# Patient Record
Sex: FEMALE | Race: WHITE | NOT HISPANIC OR LATINO | URBAN - METROPOLITAN AREA
[De-identification: names, ages, dates, MRNs, and addresses within clinical notes are randomized per-mention and may not be internally consistent; named-entity substitution may affect disease eponyms.]

---

## 2023-10-30 PROBLEM — R35.1 NOCTURIA: Status: ACTIVE | Noted: 2023-10-30

## 2023-10-30 PROBLEM — N95.2 ATROPHIC VAGINITIS: Status: ACTIVE | Noted: 2023-10-30

## 2023-10-30 PROBLEM — N39.3 FEMALE STRESS INCONTINENCE: Status: ACTIVE | Noted: 2023-10-30

## 2023-10-30 PROBLEM — N81.89 PELVIC FLOOR WEAKNESS: Status: ACTIVE | Noted: 2023-10-30

## 2023-10-30 RX ORDER — MULTIVITAMIN
1 TABLET ORAL DAILY
COMMUNITY

## 2023-10-30 RX ORDER — ESTRADIOL 0.1 MG/G
CREAM VAGINAL
COMMUNITY
Start: 2020-03-02 | End: 2023-12-12 | Stop reason: WASHOUT

## 2023-10-30 RX ORDER — PANTOPRAZOLE SODIUM 40 MG/1
40 TABLET, DELAYED RELEASE ORAL DAILY
COMMUNITY
Start: 2019-04-29 | End: 2023-12-12 | Stop reason: WASHOUT

## 2023-10-30 RX ORDER — METHOCARBAMOL 750 MG/1
750 TABLET, FILM COATED ORAL DAILY PRN
COMMUNITY
Start: 2019-06-12 | End: 2023-12-12 | Stop reason: WASHOUT

## 2023-10-30 RX ORDER — CETIRIZINE HYDROCHLORIDE 10 MG/1
10 TABLET ORAL DAILY
COMMUNITY
Start: 2017-04-20 | End: 2023-12-12 | Stop reason: WASHOUT

## 2023-10-30 RX ORDER — MIRTAZAPINE 15 MG/1
15 TABLET, FILM COATED ORAL NIGHTLY
COMMUNITY
Start: 2023-04-20 | End: 2023-12-12 | Stop reason: WASHOUT

## 2023-10-30 ASSESSMENT — ENCOUNTER SYMPTOMS: ABDOMINAL PAIN: 1

## 2023-10-31 ENCOUNTER — TELEPHONE (OUTPATIENT)
Dept: PRIMARY CARE | Facility: CLINIC | Age: 63
End: 2023-10-31

## 2023-10-31 ENCOUNTER — OFFICE VISIT (OUTPATIENT)
Dept: PRIMARY CARE | Facility: CLINIC | Age: 63
End: 2023-10-31
Payer: COMMERCIAL

## 2023-10-31 VITALS — SYSTOLIC BLOOD PRESSURE: 124 MMHG | DIASTOLIC BLOOD PRESSURE: 80 MMHG | HEIGHT: 64 IN | HEART RATE: 78 BPM

## 2023-10-31 DIAGNOSIS — R93.5 ABNORMAL CT OF THE ABDOMEN: ICD-10-CM

## 2023-10-31 DIAGNOSIS — R93.2 ABNORMAL CT OF LIVER: Primary | ICD-10-CM

## 2023-10-31 PROCEDURE — 99213 OFFICE O/P EST LOW 20 MIN: CPT | Performed by: INTERNAL MEDICINE

## 2023-10-31 PROCEDURE — 1036F TOBACCO NON-USER: CPT | Performed by: INTERNAL MEDICINE

## 2023-10-31 NOTE — PROGRESS NOTES
"Subjective   Patient ID: Cecelia Birmingham is a 63 y.o. female who presents for Sick Visit (Abdominal pain ).    Abdominal Pain  This is a new problem. The current episode started 1 to 4 weeks ago. The onset quality is sudden. The problem occurs constantly. The problem has been gradually improving. The pain is located in the LUQ and periumbilical region. The pain is at a severity of 6/10. The quality of the pain is sharp. The abdominal pain radiates to the epigastric region and periumbilical region. Nothing aggravates the pain. The pain is relieved by Sitting up. Prior diagnostic workup includes CT scan.    abd pain  See cuellar er   Ct abnormal had liver aortic exam     Then home     Now we need full cuellar of those lesions  No weight lesions     Review of Systems   Gastrointestinal:  Positive for abdominal pain.       Objective   /80 (BP Location: Left arm, Patient Position: Sitting, BP Cuff Size: Small adult)   Pulse 78   Ht 1.626 m (5' 4\")     Physical Exam  Scant abd pain left side   Card rrr  Lungs cta      Assessment/Plan   Diagnoses and all orders for this visit:  Abnormal CT of liver  Comments:  see ct scan needs mri  Abnormal CT of the abdomen  Comments:  see ct scan has an abnormal area of the low abd       "

## 2023-10-31 NOTE — TELEPHONE ENCOUNTER
For the MRI PELVIS - what is the reason for the test - trying to get prior auth.  The MRI Liver/Abdomen is for the liver and kidney abnormalities on the CT Scan, but I am not seeing the Pelvis reason?   Thanks.

## 2023-11-06 ASSESSMENT — ENCOUNTER SYMPTOMS: ABDOMINAL PAIN: 1

## 2023-11-16 ENCOUNTER — APPOINTMENT (OUTPATIENT)
Dept: PRIMARY CARE | Facility: CLINIC | Age: 63
End: 2023-11-16
Payer: COMMERCIAL

## 2023-11-28 ENCOUNTER — HOSPITAL ENCOUNTER (OUTPATIENT)
Dept: RADIOLOGY | Facility: HOSPITAL | Age: 63
Discharge: HOME | End: 2023-11-28
Payer: COMMERCIAL

## 2023-11-28 DIAGNOSIS — R93.2 ABNORMAL FINDINGS ON DIAGNOSTIC IMAGING OF LIVER AND BILIARY TRACT: ICD-10-CM

## 2023-11-28 DIAGNOSIS — R93.5 ABNORMAL FINDINGS ON DIAGNOSTIC IMAGING OF OTHER ABDOMINAL REGIONS, INCLUDING RETROPERITONEUM: ICD-10-CM

## 2023-11-28 PROCEDURE — A9575 INJ GADOTERATE MEGLUMI 0.1ML: HCPCS | Performed by: INTERNAL MEDICINE

## 2023-11-28 PROCEDURE — 2550000001 HC RX 255 CONTRASTS: Performed by: INTERNAL MEDICINE

## 2023-11-28 PROCEDURE — 74183 MRI ABD W/O CNTR FLWD CNTR: CPT | Performed by: STUDENT IN AN ORGANIZED HEALTH CARE EDUCATION/TRAINING PROGRAM

## 2023-11-28 PROCEDURE — 74183 MRI ABD W/O CNTR FLWD CNTR: CPT

## 2023-11-28 RX ORDER — GADOTERATE MEGLUMINE 376.9 MG/ML
0.2 INJECTION INTRAVENOUS
Status: COMPLETED | OUTPATIENT
Start: 2023-11-28 | End: 2023-11-28

## 2023-11-28 RX ADMIN — GADOTERATE MEGLUMINE 10.4 ML: 376.9 INJECTION INTRAVENOUS at 07:44

## 2023-11-30 ENCOUNTER — TELEPHONE (OUTPATIENT)
Dept: PRIMARY CARE | Facility: CLINIC | Age: 63
End: 2023-11-30
Payer: COMMERCIAL

## 2023-11-30 DIAGNOSIS — R93.5 ABNORMAL MRI OF ABDOMEN: ICD-10-CM

## 2023-11-30 DIAGNOSIS — R93.5 ABNORMAL CT OF THE ABDOMEN: Primary | ICD-10-CM

## 2023-12-12 ENCOUNTER — OFFICE VISIT (OUTPATIENT)
Dept: SURGERY | Facility: CLINIC | Age: 63
End: 2023-12-12
Payer: COMMERCIAL

## 2023-12-12 VITALS
DIASTOLIC BLOOD PRESSURE: 72 MMHG | TEMPERATURE: 98.3 F | RESPIRATION RATE: 16 BRPM | HEIGHT: 64 IN | SYSTOLIC BLOOD PRESSURE: 112 MMHG | BODY MASS INDEX: 19.38 KG/M2 | WEIGHT: 113.54 LBS | OXYGEN SATURATION: 93 % | HEART RATE: 64 BPM

## 2023-12-12 DIAGNOSIS — R93.5 ABNORMAL CT OF THE ABDOMEN: ICD-10-CM

## 2023-12-12 DIAGNOSIS — R93.5 ABNORMAL MRI OF ABDOMEN: ICD-10-CM

## 2023-12-12 PROCEDURE — 1036F TOBACCO NON-USER: CPT | Performed by: SURGERY

## 2023-12-12 PROCEDURE — 99205 OFFICE O/P NEW HI 60 MIN: CPT | Performed by: SURGERY

## 2023-12-12 ASSESSMENT — PAIN SCALES - GENERAL: PAINLEVEL: 0-NO PAIN

## 2023-12-12 NOTE — PROGRESS NOTES
History Of Present Illness :  Cecelia Birmingham is a 63 y.o. female who presents on referral from her physician, Dr. Heriberto Hebert, for evaluation with regard to recent abnormal abdominal imaging.    On 10/25/2023, the patient noticed the acute onset of sharp mid abdominal pain shortly after awakening from sleep.  This persisted throughout the day.   she had associated bloating.  She did go to work but did ultimately go to the [facility] emergency room in [facility] for further evaluation and treatment.  She denies fever chills sweats nausea vomiting diarrhea or constipation.  CT scan was performed and she was treated and released.  She apparently was discharged on antibiotics.  Thereafter, she felt better.  The bloating is resolved.  She only has some minimal residual abdominal discomfort.    Patient underwent a CT scan of the abdomen pelvis with IV contrast at [facility], for the indication of left lower quadrant abdominal pain..  I do not have access to the images, with the report.  According to report, the patient had some aortocaval and left periaortic ill-defined soft tissue extending below the aortic bifurcation.  Differential was thought to be retroperitoneal fibrosis, idiopathic, nonaneurysmal form of chronic aortitis or lymphoma.  There was no associated hydroureteronephrosis.  In addition a 2.8 cm mixed hyperattenuating and hypoattenuating mass was noted the left lateral lobe of the liver thought to be periportal edema.  A tiny 7 mm lesion was noted in the lower pole of the right kidney.    Patient underwent follow-up MRI imaging at  and I reviewed the report and the images.  A simple cyst was noted in the right kidney 5 mm lower pole.  Hemangioma noted in the liver corresponding to the CT finding.  The retroperitoneal soft tissue fullness is confirmed and not significantly different than the CAT scan imaging.  The differential diagnosis remained the same.    The patient is never had a previous abdominal  operation.    The patient is .  She lives in [place].  She is nulligravida.  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    Past Medical History   No past medical history on file.     Surgical History    Past Surgical History:   Procedure Laterality Date    OTHER SURGICAL HISTORY  03/02/2020    Ablation        Allergies   No Known Allergies     Home Meds  Current Outpatient Medications   Medication Instructions    cetirizine (ZYRTEC) 10 mg, oral, Daily    estradiol (Estrace) 0.01 % (0.1 mg/gram) vaginal cream 0.5 mg Vaginal and vulva Two times a Week for 90 days    methocarbamol (ROBAXIN) 750 mg, oral, Daily PRN    mirtazapine (REMERON) 15 mg, oral, Nightly    multivitamin tablet 1 tablet, oral, Daily    pantoprazole (PROTONIX) 40 mg, oral, Daily        Family History    Family History   Problem Relation Name Age of Onset    Breast cancer Other Grandparent         Social History  Social History     Tobacco Use    Smoking status: Never     Passive exposure: Never    Smokeless tobacco: Never   Substance Use Topics    Alcohol use: Yes     Alcohol/week: 2.0 standard drinks of alcohol     Types: 2 Glasses of wine per week    Drug use: Never        Review Of Systems    Review of Systems    General: Not Present- Obesity, Cancer, HIV, MRSA, Recent Cold/Flu, Tired During the Day and VRE.  HEENT: Not Present- Migraine, Cataracts, Glaucoma, Macular Degeneration and Retinal Detachment.  Respiratory: Not Present- Asthma, Chronic Cough, Difficulty Breathing on Exertion, Difficulty Breathing at Rest, Emphysema, Frequent Bronchitis, Home CPAP/BiPAP, Home Oxygen, Pulmonary Embolus, Pneumonia/TB, Sleep Apnea and Snoring.  Cardiovascular: Not Present- Chest Pain, Congestive Heart Failure, Heart Attack, Coronary Artery Disease, Heart Stent, High Cholesterol/Lipids, Hypertension, Internal Defibrillator, Irregular Heart Beat, Mitral Valve Prolapse, Murmur, Pacemaker and Peripheral Vascular Disease.  Gastrointestinal: Not Present-  Heartburn, Hepatitis, Hiatal Hernia, Jaundice, Reflux, Stomach Ulcer and IBS.  Female Genitourinary: Not Present- Kidney Failure, Kidney Stones, Dialysis and Urinary Tract Infection.  Musculoskeletal: Not Present- Arthritis, Back Pain and Fibromyalgia.  Neurological: Not Present- Headaches, Numbness, Tingling, Seizures, Stroke,  Shunt and Weakness.  Psychiatric: Not Present- Anxiety, Bipolar, Depression and Panic Attacks.  Endocrine: Not Present- Diabetes, Hyperthyroidism, Hypothyroidism and Low Blood Sugar.  Hematology: Not Present- Abnormal Bleeding, Anemia and Blood Clots.    Vitals  There were no vitals taken for this visit.     Physical Exam   Abdominal / Ano-Rectal Physical Exam    General  General Appearance - Not in acute distress.  Well-developed and well-nourished.  Alert and oriented times 3.    Chest and Lung Exam  Auscultation:  Breath sounds: - Normal.  Clear and equal bilaterally.  Adventitious sounds: - No Adventitious sounds.    Cardiovascular  Auscultation: Rate and Rhythm - Regular. Heart Sounds - Normal heart sounds.  No murmurs.    Abdomen  Inspection: Inspection of the abdomen reveals - No Visible peristalsis, No Abnormal pulsations, No Paradoxical  movements and No Hernias.  Palpation/Percussion: Palpation and Percussion of the abdomen reveal - Non Tender, No Rebound tenderness, NoRigidity (guarding) and No Palpable abdominal masses.  Liver: - Normal.  Spleen: - Normal.  Auscultation: Auscultation of the abdomen reveals - Bowel sounds normal and No Abdominal bruits.  Surgical scars:  none    Inguinal  No inguinal or femoral hernias or lymphadenopathy bilaterally.    Rectal  Anorectal Exam:  not examined.      Assessment/Plan   Mrs. Birmingham has retroperitoneal soft tissue thickening along the left lateral aspect of the aorta near the bifurcation of uncertain etiology.  This was discovered with with CAT scan imaging with transient abdominal pain.  This is likely an incidental finding.   Follow-up MRI revealed similar findings.  Etiology is unclear.    Recommendation:    Will review the MRI imaging with Dr. Dick Bansal, interventional radiologist at [facility].  Management options include biopsy versus short-term imaging follow-up in 4 to 6 months.  The latter will likely be the recommendation.  I will call the patient with results of the review and the management plan.

## 2023-12-18 ENCOUNTER — HOSPITAL ENCOUNTER (OUTPATIENT)
Dept: RADIOLOGY | Facility: EXTERNAL LOCATION | Age: 63
Discharge: HOME | End: 2023-12-18

## 2023-12-21 ENCOUNTER — TELEPHONE (OUTPATIENT)
Dept: SURGERY | Facility: CLINIC | Age: 63
End: 2023-12-21
Payer: COMMERCIAL

## 2023-12-21 NOTE — TELEPHONE ENCOUNTER
----- Message from Cecelia Birmingham sent at 12/20/2023  4:47 PM EST -----  Regarding: Question  Contact: [phone]  I wanted to know if my last message implied that the radiologist at [facility] read my scans.

## 2023-12-27 ENCOUNTER — DOCUMENTATION (OUTPATIENT)
Dept: SURGERY | Facility: CLINIC | Age: 63
End: 2023-12-27
Payer: COMMERCIAL

## 2023-12-27 DIAGNOSIS — R93.5 ABNORMAL MRI OF ABDOMEN: ICD-10-CM

## 2023-12-27 DIAGNOSIS — R93.5 ABNORMAL CT OF THE ABDOMEN: Primary | ICD-10-CM

## 2023-12-27 NOTE — PROGRESS NOTES
The patient was seen by me for an initial office consultation on 12/12/2023.  Please see that note for details.    Thereafter, I did have our interventional radiologist at [facility], Dr. Dick Bansal reviewed the images.  He initially reviewed the MRI, and subsequently the CT images were uploaded to the  PACS.  These were also reviewed by Dr. Bansal.  He does think the lesion measures about 1.5 cm in diameter and is amendable to CT-guided percutaneous biopsy.    I discussed this with the patient in detail on 12/22/2023, and she does agree to proceed with biopsy.  I have placed the order, and discussed via chat with Dr. Bansal and our radiology supervisor.  The biopsy will be arranged for Friday in January electively.  I will call the patient thereafter, with the biopsy results and management plan.

## 2024-01-12 ENCOUNTER — HOSPITAL ENCOUNTER (OUTPATIENT)
Dept: RADIOLOGY | Facility: HOSPITAL | Age: 64
Discharge: HOME | End: 2024-01-12
Payer: COMMERCIAL

## 2024-01-12 VITALS
RESPIRATION RATE: 16 BRPM | OXYGEN SATURATION: 100 % | SYSTOLIC BLOOD PRESSURE: 107 MMHG | BODY MASS INDEX: 20.02 KG/M2 | DIASTOLIC BLOOD PRESSURE: 66 MMHG | HEART RATE: 71 BPM | TEMPERATURE: 98.2 F | WEIGHT: 113 LBS | HEIGHT: 63 IN

## 2024-01-12 DIAGNOSIS — R93.5 ABNORMAL MRI OF ABDOMEN: ICD-10-CM

## 2024-01-12 DIAGNOSIS — R93.5 ABNORMAL CT OF THE ABDOMEN: ICD-10-CM

## 2024-01-12 LAB
INR PPP: 1.1 (ref 0.9–1.1)
PLATELET # BLD AUTO: 385 X10*3/UL (ref 150–450)
PROTHROMBIN TIME: 12.4 SECONDS (ref 9.8–12.8)

## 2024-01-12 PROCEDURE — 36415 COLL VENOUS BLD VENIPUNCTURE: CPT | Performed by: RADIOLOGY

## 2024-01-12 PROCEDURE — 99153 MOD SED SAME PHYS/QHP EA: CPT

## 2024-01-12 PROCEDURE — 88307 TISSUE EXAM BY PATHOLOGIST: CPT | Mod: TC,SUR,PARLAB | Performed by: SURGERY

## 2024-01-12 PROCEDURE — 99152 MOD SED SAME PHYS/QHP 5/>YRS: CPT

## 2024-01-12 PROCEDURE — 77012 CT SCAN FOR NEEDLE BIOPSY: CPT | Performed by: RADIOLOGY

## 2024-01-12 PROCEDURE — 2720000007 HC OR 272 NO HCPCS

## 2024-01-12 PROCEDURE — 99153 MOD SED SAME PHYS/QHP EA: CPT | Performed by: RADIOLOGY

## 2024-01-12 PROCEDURE — 85049 AUTOMATED PLATELET COUNT: CPT | Performed by: RADIOLOGY

## 2024-01-12 PROCEDURE — 88365 INSITU HYBRIDIZATION (FISH): CPT | Performed by: PATHOLOGY

## 2024-01-12 PROCEDURE — 77012 CT SCAN FOR NEEDLE BIOPSY: CPT

## 2024-01-12 PROCEDURE — 99152 MOD SED SAME PHYS/QHP 5/>YRS: CPT | Performed by: RADIOLOGY

## 2024-01-12 PROCEDURE — 88341 IMHCHEM/IMCYTCHM EA ADD ANTB: CPT | Performed by: PATHOLOGY

## 2024-01-12 PROCEDURE — 2500000005 HC RX 250 GENERAL PHARMACY W/O HCPCS: Performed by: RADIOLOGY

## 2024-01-12 PROCEDURE — 88342 IMHCHEM/IMCYTCHM 1ST ANTB: CPT | Performed by: PATHOLOGY

## 2024-01-12 PROCEDURE — 88307 TISSUE EXAM BY PATHOLOGIST: CPT | Performed by: PATHOLOGY

## 2024-01-12 PROCEDURE — 2500000004 HC RX 250 GENERAL PHARMACY W/ HCPCS (ALT 636 FOR OP/ED): Performed by: RADIOLOGY

## 2024-01-12 PROCEDURE — 85610 PROTHROMBIN TIME: CPT | Performed by: RADIOLOGY

## 2024-01-12 PROCEDURE — 49180 BIOPSY ABDOMINAL MASS: CPT | Performed by: RADIOLOGY

## 2024-01-12 RX ORDER — FENTANYL CITRATE 50 UG/ML
INJECTION, SOLUTION INTRAMUSCULAR; INTRAVENOUS
Status: COMPLETED | OUTPATIENT
Start: 2024-01-12 | End: 2024-01-12

## 2024-01-12 RX ORDER — MIDAZOLAM HYDROCHLORIDE 1 MG/ML
INJECTION, SOLUTION INTRAMUSCULAR; INTRAVENOUS
Status: COMPLETED | OUTPATIENT
Start: 2024-01-12 | End: 2024-01-12

## 2024-01-12 RX ORDER — SODIUM CHLORIDE 9 MG/ML
INJECTION, SOLUTION INTRAVENOUS CONTINUOUS PRN
Status: COMPLETED | OUTPATIENT
Start: 2024-01-12 | End: 2024-01-12

## 2024-01-12 RX ORDER — MILK THISTLE 150 MG
1 CAPSULE ORAL DAILY
COMMUNITY

## 2024-01-12 RX ORDER — UBIDECARENONE 75 MG
500 CAPSULE ORAL DAILY
COMMUNITY

## 2024-01-12 RX ORDER — BUTYROSPERMUM PARKII(SHEA BUTTER), SIMMONDSIA CHINENSIS (JOJOBA) SEED OIL, ALOE BARBADENSIS LEAF EXTRACT .01; 1; 3.5 G/100G; G/100G; G/100G
250 LIQUID TOPICAL DAILY
COMMUNITY

## 2024-01-12 RX ADMIN — FENTANYL CITRATE 50 MCG: 50 INJECTION, SOLUTION INTRAMUSCULAR; INTRAVENOUS at 10:19

## 2024-01-12 RX ADMIN — SODIUM CHLORIDE 50 ML/HR: 9 INJECTION, SOLUTION INTRAVENOUS at 10:19

## 2024-01-12 RX ADMIN — Medication 3 L/MIN: at 10:19

## 2024-01-12 RX ADMIN — MIDAZOLAM 1 MG: 1 INJECTION INTRAMUSCULAR; INTRAVENOUS at 10:19

## 2024-01-12 ASSESSMENT — PAIN SCALES - GENERAL

## 2024-01-12 ASSESSMENT — PAIN - FUNCTIONAL ASSESSMENT

## 2024-01-12 NOTE — DISCHARGE INSTRUCTIONS
Patient educated regarding discharge instructions for care after moderate conscious sedation and care after retroperitoneal soft tissue biopsy as well as given written instruction. Patient discharged to home in stable condition.

## 2024-01-12 NOTE — PRE-PROCEDURE NOTE
Interventional Radiology Preprocedure Note    Indication for procedure: Diagnoses of Abnormal CT of the abdomen and Abnormal MRI of abdomen were pertinent to this visit.    Relevant review of systems: NA    Relevant Labs:   Lab Results   Component Value Date    INR 1.1 01/12/2024    PROTIME 12.4 01/12/2024       Planned Sedation/Anesthesia: Minimal    Airway assessment: normal    Directed physical examination:    Aox3  No increased work of breathing.   No acute distress      Mallampati: II (hard and soft palate, upper portion of tonsils anduvula visible)    ASA Score: ASA 2 - Patient with mild systemic disease with no functional limitations    Benefits, risks and alternatives of procedure and planned sedation have been discussed with the patient and/or their representative. All questions answered and they agree to proceed.

## 2024-01-12 NOTE — PROCEDURES
Interventional Radiology Brief Postprocedure Note    Attending: Jorden Bansal MD    Assistant:   Staff Role   Rebecca Resendiz, RN Radiology Nurse   Jorden Bansal MD Radiologist       Diagnosis:   1. Abnormal CT of the abdomen  CT guided percutaneous biopsy retroperitoneum    CT guided percutaneous biopsy retroperitoneum      2. Abnormal MRI of abdomen  CT guided percutaneous biopsy retroperitoneum    CT guided percutaneous biopsy retroperitoneum          Description of procedure: CT guided percutaneous biopsy retroperitoneum 20 G x 2    Timeout:  Yes    Procedure Area: Procedure Area     Anesthesia:   Conscious Sedation    Complications: None    Estimated Blood Loss: minimal    Medications (Filter: Administrations occurring from 1000 to 1054 on 01/12/24) As of 01/12/24 1054      oxygen (O2) therapy (L/min) Total volume:  Not documented*   *Total volume has not been documented. View each administration to see the amount administered.     Date/Time Rate/Dose/Volume Action       01/12/24  1019 3 L/min Start               fentaNYL PF (Sublimaze) injection (mcg) Total dose:  50 mcg      Date/Time Rate/Dose/Volume Action       01/12/24  1019 50 mcg Given               midazolam (Versed) injection (mg) Total dose:  1 mg      Date/Time Rate/Dose/Volume Action       01/12/24  1019 1 mg Given               sodium chloride 0.9% infusion (mL/hr) Total volume:  Not documented*   *Total volume has not been documented. View each administration to see the amount administered.     Date/Time Rate/Dose/Volume Action       01/12/24  1019 50 mL/hr New Bag                         See detailed result report with images in PACS.    The patient tolerated the procedure well without incident or complication and is in stable condition.

## 2024-01-17 ENCOUNTER — TELEPHONE (OUTPATIENT)
Dept: GASTROENTEROLOGY | Facility: CLINIC | Age: 64
End: 2024-01-17

## 2024-01-17 NOTE — TELEPHONE ENCOUNTER
Patient called in and requested the results of her recent biopsy - patient would like a call back to discuss.

## 2024-01-22 DIAGNOSIS — R93.5 ABNORMAL CT OF THE ABDOMEN: Primary | ICD-10-CM

## 2024-01-23 LAB
LAB AP ASR DISCLAIMER: NORMAL
LABORATORY COMMENT REPORT: NORMAL
PATH REPORT.ADDENDUM SPEC: NORMAL
PATH REPORT.FINAL DX SPEC: NORMAL
PATH REPORT.GROSS SPEC: NORMAL
PATH REPORT.RELEVANT HX SPEC: NORMAL
PATH REPORT.TOTAL CANCER: NORMAL
RESIDENT REVIEW: NORMAL

## 2024-01-24 ENCOUNTER — DOCUMENTATION (OUTPATIENT)
Dept: SURGERY | Facility: HOSPITAL | Age: 64
End: 2024-01-24

## 2024-01-24 DIAGNOSIS — R93.5 ABNORMAL CT OF THE ABDOMEN: Primary | ICD-10-CM

## 2024-01-24 NOTE — PROGRESS NOTES
CT-guided needle biopsy of the periaortic retroperitoneal mass/lymph node was accomplished on 1/12/2024 at [facility].  This was per Dr. Bansal.  I personally reviewed the report and the images.    Pathology resulted on 1/19/2024.  This revealed an atypical lymphoid infiltrate.  Only tissue was sent for histology however.  By IHC, the lymphocytes were predominantly B cells.  There is a concern for a B-cell lymphoproliferative disorder.  However, no flow cytometry could be performed as the sample was sent in formalin.  Repeat biopsy with flow cytometry is suggested for further evaluation.    I discussed this case personally with Dr. Bansal the performing radiologist, on 1/22/2024.  This was a challenging biopsy secondary to the periaortic location, and only scant tissue could be obtained.  Therefore, the decision was made to send the tissue for histology only.  However, Dr. Bansal notes that the patient is a candidate for repeat biopsy for tissue for flow cytometry.    I discussed the above results and recommendations with the patient on 1/23/2024.  Options were explained to the patient.  She has agreed to proceed with repeat needle biopsy for flow cytometry.  This has been ordered and will be scheduled per radiology.  Discussed with the radiology supervisor and Dr. Bansal at [facility].    Will call patient with results and management plan.

## 2024-02-06 ENCOUNTER — HOSPITAL ENCOUNTER (OUTPATIENT)
Dept: RADIOLOGY | Facility: HOSPITAL | Age: 64
Discharge: HOME | End: 2024-02-06
Payer: COMMERCIAL

## 2024-02-06 VITALS
RESPIRATION RATE: 14 BRPM | HEIGHT: 63 IN | TEMPERATURE: 98.1 F | WEIGHT: 115 LBS | SYSTOLIC BLOOD PRESSURE: 109 MMHG | BODY MASS INDEX: 20.38 KG/M2 | HEART RATE: 65 BPM | OXYGEN SATURATION: 97 % | DIASTOLIC BLOOD PRESSURE: 65 MMHG

## 2024-02-06 DIAGNOSIS — R93.5 ABNORMAL CT OF THE ABDOMEN: ICD-10-CM

## 2024-02-06 LAB
CREAT SERPL-MCNC: 0.6 MG/DL (ref 0.6–1.3)
GFR SERPL CREATININE-BSD FRML MDRD: >90 ML/MIN/1.73M*2

## 2024-02-06 PROCEDURE — 99152 MOD SED SAME PHYS/QHP 5/>YRS: CPT

## 2024-02-06 PROCEDURE — 49180 BIOPSY ABDOMINAL MASS: CPT | Performed by: RADIOLOGY

## 2024-02-06 PROCEDURE — 2550000001 HC RX 255 CONTRASTS: Performed by: RADIOLOGY

## 2024-02-06 PROCEDURE — 82565 ASSAY OF CREATININE: CPT

## 2024-02-06 PROCEDURE — 77012 CT SCAN FOR NEEDLE BIOPSY: CPT | Performed by: RADIOLOGY

## 2024-02-06 PROCEDURE — 77012 CT SCAN FOR NEEDLE BIOPSY: CPT

## 2024-02-06 PROCEDURE — 99153 MOD SED SAME PHYS/QHP EA: CPT | Performed by: RADIOLOGY

## 2024-02-06 PROCEDURE — 99152 MOD SED SAME PHYS/QHP 5/>YRS: CPT | Performed by: RADIOLOGY

## 2024-02-06 PROCEDURE — 2720000007 HC OR 272 NO HCPCS

## 2024-02-06 PROCEDURE — 2500000004 HC RX 250 GENERAL PHARMACY W/ HCPCS (ALT 636 FOR OP/ED): Performed by: RADIOLOGY

## 2024-02-06 PROCEDURE — 88185 FLOWCYTOMETRY/TC ADD-ON: CPT | Mod: TC,PARLAB | Performed by: SURGERY

## 2024-02-06 PROCEDURE — 88189 FLOWCYTOMETRY/READ 16 & >: CPT | Performed by: SURGERY

## 2024-02-06 PROCEDURE — 99153 MOD SED SAME PHYS/QHP EA: CPT

## 2024-02-06 RX ORDER — FENTANYL CITRATE 50 UG/ML
INJECTION, SOLUTION INTRAMUSCULAR; INTRAVENOUS
Status: COMPLETED | OUTPATIENT
Start: 2024-02-06 | End: 2024-02-06

## 2024-02-06 RX ORDER — MIDAZOLAM HYDROCHLORIDE 1 MG/ML
INJECTION, SOLUTION INTRAMUSCULAR; INTRAVENOUS
Status: COMPLETED | OUTPATIENT
Start: 2024-02-06 | End: 2024-02-06

## 2024-02-06 RX ADMIN — IOHEXOL 75 ML: 350 INJECTION, SOLUTION INTRAVENOUS at 10:29

## 2024-02-06 RX ADMIN — MIDAZOLAM 2 MG: 1 INJECTION INTRAMUSCULAR; INTRAVENOUS at 09:48

## 2024-02-06 RX ADMIN — FENTANYL CITRATE 100 MCG: 50 INJECTION, SOLUTION INTRAMUSCULAR; INTRAVENOUS at 09:47

## 2024-02-06 ASSESSMENT — PAIN SCALES - GENERAL

## 2024-02-06 ASSESSMENT — PAIN - FUNCTIONAL ASSESSMENT: PAIN_FUNCTIONAL_ASSESSMENT: 0-10

## 2024-02-06 NOTE — PRE-PROCEDURE NOTE
Interventional Radiology Preprocedure Note    Indication for procedure: The encounter diagnosis was Abnormal CT of the abdomen.    Relevant review of systems: NA    Relevant Labs:   Lab Results   Component Value Date    EGFR >90 02/06/2024    INR 1.1 01/12/2024    PROTIME 12.4 01/12/2024       Planned Sedation/Anesthesia: Minimal    Airway assessment: normal    Directed physical examination:    Aox3  No increased work of breathing.   No acute distress      Mallampati: II (hard and soft palate, upper portion of tonsils anduvula visible)    ASA Score: ASA 2 - Patient with mild systemic disease with no functional limitations    Benefits, risks and alternatives of procedure and planned sedation have been discussed with the patient and/or their representative. All questions answered and they agree to proceed.

## 2024-02-06 NOTE — PROCEDURES
Interventional Radiology Brief Postprocedure Note    Attending: Jorden Bansal MD    Assistant:   Staff Role   Hui Toribio, RN Radiology Nurse   Jorden Bansal MD Radiologist       Diagnosis:   1. Abnormal CT of the abdomen  CT guided percutaneous biopsy retroperitoneum    CT guided percutaneous biopsy retroperitoneum          Description of procedure: CT guided percutaneous biopsy retroperitoneum 20 G core x 4 sent to flow cytometry in [facility]    Timeout:  Yes    Procedure Area: Procedure Area     Anesthesia:   Conscious Sedation    Complications: None    Estimated Blood Loss: minimal    Medications (Filter: Administrations occurring from 0938 to 1021 on 02/06/24) As of 02/06/24 1021      fentaNYL PF (Sublimaze) injection (mcg) Total dose:  100 mcg      Date/Time Rate/Dose/Volume Action       02/06/24  0947 100 mcg Given               midazolam (Versed) injection (mg) Total dose:  2 mg      Date/Time Rate/Dose/Volume Action       02/06/24  0948 2 mg Given                         See detailed result report with images in PACS.    The patient tolerated the procedure well without incident or complication and is in stable condition.

## 2024-02-15 LAB
CELL COUNT (BLOOD): 0.01 X10*3/UL
CELL POPULATIONS: NORMAL
DIAGNOSIS: NORMAL
FLOW DIFFERENTIAL: NORMAL
FLOW TEST ORDERED: NORMAL
LAB TEST METHOD: NORMAL
NUMBER OF CELLS COLLECTED: NORMAL
PATH REPORT.TOTAL CANCER: NORMAL
SIGNATURE COMMENT: NORMAL
SPECIMEN VIABILITY: NORMAL

## 2024-02-19 ENCOUNTER — TELEPHONE (OUTPATIENT)
Dept: SURGERY | Facility: CLINIC | Age: 64
End: 2024-02-19

## 2024-02-19 ENCOUNTER — TELEPHONE (OUTPATIENT)
Dept: GASTROENTEROLOGY | Facility: CLINIC | Age: 64
End: 2024-02-19

## 2024-02-19 NOTE — TELEPHONE ENCOUNTER
Dr. Nunez is waiting on surgicial pathology report and once those are in and review by him he will contact patient

## 2024-02-20 ENCOUNTER — APPOINTMENT (OUTPATIENT)
Dept: GASTROENTEROLOGY | Facility: CLINIC | Age: 64
End: 2024-02-20

## 2024-03-06 ENCOUNTER — TELEPHONE (OUTPATIENT)
Dept: SURGERY | Facility: CLINIC | Age: 64
End: 2024-03-06

## 2024-03-06 ENCOUNTER — DOCUMENTATION (OUTPATIENT)
Dept: SURGERY | Facility: CLINIC | Age: 64
End: 2024-03-06

## 2024-03-06 DIAGNOSIS — R93.5 ABNORMAL CT OF THE ABDOMEN: Primary | ICD-10-CM

## 2024-03-06 DIAGNOSIS — R59.0 RETROPERITONEAL LYMPHADENOPATHY: Primary | ICD-10-CM

## 2024-03-06 PROBLEM — M62.89 PELVIC FLOOR DYSFUNCTION: Status: ACTIVE | Noted: 2023-10-30

## 2024-03-06 PROBLEM — N39.3 STRESS INCONTINENCE IN FEMALE: Status: ACTIVE | Noted: 2023-10-30

## 2024-03-06 RX ORDER — TERBINAFINE HYDROCHLORIDE 250 MG/1
250 TABLET ORAL DAILY
COMMUNITY
Start: 2024-03-05

## 2024-03-06 NOTE — TELEPHONE ENCOUNTER
Received a request from Dr. Nunez to place a referral for patient to see Dr. Morales at [facility] for lyphadenopathy.  Order was placed and patient was made aware and verbalized understanding.  Patient was given central scheduling number to call.

## 2024-03-06 NOTE — PROGRESS NOTES
"General Surgery Follow-up    This patient was initially seen by me on 12/12/2023.  Additional documentation was performed on 12/27/2023, and 1/24/2024.  Please see those notes for details.    The patient underwent repeat CT-guided biopsy of the retroperitoneal periaortic infrarenal soft tissue mass/lymphoma on 2/6/2024.  The report was reviewed.  This was done specifically for flow cytometry.    The flow cytometry resulted on 2/15/2024.  This revealed:    \"CD5-, CD10- kappa restricted B cell population (4% of total events)  No abnormal T cell population identified.      Note: The B cells are CD19+, CD20+ and negative for CD23, CD43. Clinical and morphologic correlation are suggested. \"     I called the patient today and reviewed the pathology with her.  I noted to her that based on my knowledge base, I am not at liberty to discuss the significance of these findings.  Of note, the previous soft tissue mass biopsy from 1/12/2024 revealed limited tissue with an atypical lymphoid infiltrate, along with immunohistochemistry findings as noted.    Impression: This patient is status post CT-guided core needle biopsy of the retroperitoneal periaortic mass/lymph node x 2, 1/12/2024 and 2/6/2024.  Histology, IHC, and flow cytometry as noted.    Plan:    I will refer the patient to Dr. Jefferson Morales at [facility] for further evaluation of these findings, and recommendations.  "

## 2024-03-15 ENCOUNTER — TELEPHONE (OUTPATIENT)
Dept: HEMATOLOGY/ONCOLOGY | Facility: CLINIC | Age: 64
End: 2024-03-15

## 2024-03-15 ENCOUNTER — OFFICE VISIT (OUTPATIENT)
Dept: HEMATOLOGY/ONCOLOGY | Facility: CLINIC | Age: 64
End: 2024-03-15
Payer: COMMERCIAL

## 2024-03-15 VITALS
RESPIRATION RATE: 16 BRPM | OXYGEN SATURATION: 100 % | SYSTOLIC BLOOD PRESSURE: 112 MMHG | WEIGHT: 114.64 LBS | TEMPERATURE: 97.2 F | BODY MASS INDEX: 20.31 KG/M2 | DIASTOLIC BLOOD PRESSURE: 70 MMHG | HEART RATE: 62 BPM

## 2024-03-15 DIAGNOSIS — R59.0 RETROPERITONEAL LYMPHADENOPATHY: ICD-10-CM

## 2024-03-15 PROCEDURE — 99215 OFFICE O/P EST HI 40 MIN: CPT | Performed by: INTERNAL MEDICINE

## 2024-03-15 PROCEDURE — 99205 OFFICE O/P NEW HI 60 MIN: CPT | Performed by: INTERNAL MEDICINE

## 2024-03-15 PROCEDURE — 1036F TOBACCO NON-USER: CPT | Performed by: INTERNAL MEDICINE

## 2024-03-15 ASSESSMENT — PATIENT HEALTH QUESTIONNAIRE - PHQ9
1. LITTLE INTEREST OR PLEASURE IN DOING THINGS: NOT AT ALL
SUM OF ALL RESPONSES TO PHQ9 QUESTIONS 1 AND 2: 0
2. FEELING DOWN, DEPRESSED OR HOPELESS: NOT AT ALL

## 2024-03-15 ASSESSMENT — COLUMBIA-SUICIDE SEVERITY RATING SCALE - C-SSRS
1. IN THE PAST MONTH, HAVE YOU WISHED YOU WERE DEAD OR WISHED YOU COULD GO TO SLEEP AND NOT WAKE UP?: NO
2. HAVE YOU ACTUALLY HAD ANY THOUGHTS OF KILLING YOURSELF?: NO
6. HAVE YOU EVER DONE ANYTHING, STARTED TO DO ANYTHING, OR PREPARED TO DO ANYTHING TO END YOUR LIFE?: NO

## 2024-03-15 ASSESSMENT — PAIN SCALES - GENERAL: PAINLEVEL: 5

## 2024-03-15 NOTE — PROGRESS NOTES
Patient ID: Cecelia Birmingham is a 63 y.o. female.  Referring Physician: Will Nunez MD  [address] [id number]  Primary Care Provider: Heriberto Hebert MD  Visit Type: Initial Visit  Retroperitoneal lymphadenopathy.  Subjective    HPI  The patient was referred to me for further evaluation and management of retroperitoneal lymphadenopathy, evaluated on March 14, 2024.  The patient was doing well till October 25, 2023 when she noticed acute onset of sharp mid abdominal pain shortly after awakening from sleep.  It persisted through the day with associated bloating.  The patient worked the whole day but eventually presented to ED in [facility].  Did not have any fevers chills sweats nausea vomiting diarrhea constipation hematemesis melena hematochezia materia.  The patient was discharged on antibiotics after CT scans.  The patient claims that she is felt better bloating is resolved she does have minimal residual abdominal discomfort.  CT scan revealed aortocaval and left periaortic ill-defined soft tissue extending below the aortic bifurcation.  Differential diagnosis thought to be retroperitoneal fibrosis, idiopathic, not aneurysmal form of colonic aortitis or lymphoma.  There was no associated hydronephrosis.  A 2.8 cm mixed hyperattenuating and hypoattenuating mass was noted in the left lobe of the liver.  A tiny 7 mm lesion was noted in the lower pole of the right kidney.  A follow-up MRI at  revealed simple cyst in the right kidney 5 mm at the lower pole.  Hemangioma noted in the liver corresponding to the CT finding.  The retroperitoneal soft tissue fullness is confirmed and not significantly different than the CT imaging.  The patient has never had abdominal operation.CT-guided biopsy on January 12, 2024 revealed limited tissue with an atypical lymphoid infiltrate.  Flow cytometry on February 6, 2024 revealed CD5 negative, CD10 negative, kappa restricted B-cell population 4% of total  events no abnormal T-cell population identified.    At interview on March 14, 2024 the patient narrated entire history.  Denied history of weight loss, fevers, night sweats, chest pain, shortness of breath, nausea, vomiting, hematemesis, melena, hematochezia and hematuria.    Past medical history:    As above.    Past surgical history:  History of uterine ablation on March 2, 2020    Mammogram:    2 years ago.    Colonoscopy:    4 years ago.        Review of Systems   All other systems reviewed and are negative.       Objective   BSA: 1.52 meters squared  /70   Pulse 62   Temp 36.2 °C (97.2 °F)   Resp 16   Wt 52 kg (114 lb 10.2 oz)   SpO2 100%   BMI 20.31 kg/m²      has no past medical history on file.   has a past surgical history that includes Other surgical history (03/02/2020).  Family History   Problem Relation Name Age of Onset    Breast cancer Other Grandparent      Oncology History    No history exists.       Cecelia Birmingham  reports that she has never smoked. She has never been exposed to tobacco smoke. She has never used smokeless tobacco.  She  reports current alcohol use of about 2.0 standard drinks of alcohol per week.  She  reports no history of drug use.    Physical Exam  Constitutional:       Appearance: Normal appearance.   HENT:      Head: Normocephalic and atraumatic.      Nose: Nose normal.      Mouth/Throat:      Mouth: Mucous membranes are moist.      Pharynx: Oropharynx is clear.   Eyes:      Extraocular Movements: Extraocular movements intact.      Conjunctiva/sclera: Conjunctivae normal.      Pupils: Pupils are equal, round, and reactive to light.   Cardiovascular:      Rate and Rhythm: Normal rate and regular rhythm.   Pulmonary:      Effort: Pulmonary effort is normal.      Breath sounds: Normal breath sounds.   Abdominal:      General: Abdomen is flat. Bowel sounds are normal.      Palpations: Abdomen is soft.   Musculoskeletal:         General: Normal range of motion.       "Cervical back: Normal range of motion and neck supple.   Neurological:      General: No focal deficit present.      Mental Status: She is alert and oriented to person, place, and time. Mental status is at baseline.   Psychiatric:         Mood and Affect: Mood normal.         Behavior: Behavior normal.         Thought Content: Thought content normal.         Judgment: Judgment normal.         No results found for: \"WBC\"  No results found for: \"NRBC\"  No results found for: \"RBC\"  No results found for: \"HGB\"  No results found for: \"HCT\"  No results found for: \"MCV\"  No results found for: \"MCH\"  No results found for: \"MCHC\"  No results found for: \"RDW\"  Platelets   Date/Time Value Ref Range Status   01/12/2024 09:25  150 - 450 x10*3/uL Final     No results found for: \"MPV\"  No results found for: \"NEUTOPHILPCT\"  No results found for: \"IGPCT\"  No results found for: \"LYMPHOPCT\"  No results found for: \"MONOPCT\"  No results found for: \"EOSPCT\"  No results found for: \"BASOPCT\"  No results found for: \"NEUTROABS\"  No results found for: \"IGABSOL\"  No results found for: \"LYMPHSABS\"  No results found for: \"MONOSABS\"  No results found for: \"EOSABS\"  No results found for: \"BASOSABS\"    No components found for: \"PT\"  No results found for: \"APTT\"    Assessment/Plan      The patient is a 63-year-old woman who presented in October 2023 with abdominal pain.  She was evaluated at [facility] ED in [place] and the CT scan revealed possible retroperitoneal lymphadenopathy initial biopsy was inconclusive repeat biopsy with flow cytometry revealed CD5 negative, CD10 negative kappa restricted lymphocytes, 4% of events.  Would like to obtain more information and if necessary consider bone marrow aspiration and biopsy.  The patient is agreeable for follow-up and return in 4 weeks.    Thank you for allowing me to participate in care of your patient if you have any questions please feel free to call me.  Diagnoses and all orders for this " Detail Level: Detailed visit:  Retroperitoneal lymphadenopathy  -     Referral to Hematology and Oncology         Jefferson Morales MD

## 2024-03-15 NOTE — TELEPHONE ENCOUNTER
Dr. Morales was trying to speak with pathologist re: results.  Unable to speak with anyone.  Called patient to let her know Dr. Morales was unable to speak with pathologist and will have patient with 4 week follow up per dr kaiser note. Notified will call sooner if dr morales feels urgent.  Scheduling order entered. Patient had question about abdominal assessment and his wanting to speak with pathologist.  Notified that dr morales gone for the day and will try for an answer early  next week.  Call back instructions reviewed.  Patient appreciative of call.

## 2024-03-15 NOTE — PROGRESS NOTES
Dr. Morales attempting to reach pathologist to discuss results.  Dr. Ramirez secure text messaged and paged with no response. Approx 30-40 minutes. Patient sent home after verifying contact number and will reach out to her this afternoon if we hear from pathologist to discuss follow up plan.  Patient agreeable and appreciative.  Patient independently ambulatory off unit in NAD and without complaints.  Gait steady.  Call back instructions reviewed.  Patient verbalized understanding.

## 2024-03-18 ENCOUNTER — TELEPHONE (OUTPATIENT)
Dept: HEMATOLOGY/ONCOLOGY | Facility: CLINIC | Age: 64
End: 2024-03-18

## 2024-03-18 NOTE — TELEPHONE ENCOUNTER
Called patient and informed that Dr. Morales spoke to pathologist late Friday evening. Results inconclusive Dr. Morales want to speak with her regarding options. Agreed to phone visit on Thursday 3/21 at 9am. Notified Junie to schedule

## 2024-03-21 ENCOUNTER — TELEPHONE (OUTPATIENT)
Dept: SCHEDULING | Age: 64
End: 2024-03-21

## 2024-03-21 ENCOUNTER — TELEMEDICINE (OUTPATIENT)
Dept: HEMATOLOGY/ONCOLOGY | Facility: CLINIC | Age: 64
End: 2024-03-21
Payer: COMMERCIAL

## 2024-03-21 DIAGNOSIS — R59.0 RETROPERITONEAL LYMPHADENOPATHY: ICD-10-CM

## 2024-03-21 DIAGNOSIS — R93.5 ABNORMAL CT OF THE ABDOMEN: Primary | ICD-10-CM

## 2024-03-21 PROCEDURE — 99214 OFFICE O/P EST MOD 30 MIN: CPT | Mod: 95 | Performed by: INTERNAL MEDICINE

## 2024-03-21 PROCEDURE — 99214 OFFICE O/P EST MOD 30 MIN: CPT | Performed by: INTERNAL MEDICINE

## 2024-03-21 PROCEDURE — 1036F TOBACCO NON-USER: CPT | Performed by: INTERNAL MEDICINE

## 2024-03-21 NOTE — PROGRESS NOTES
Repeat CT prior to FUV in 4 months.  Labs to be done week prior to CT.  Call pl aced to patient to discuss.  No answer.  Message left with instructions on voicemail.

## 2024-03-21 NOTE — PROGRESS NOTES
Patient ID: Cecelia Birmingham is a 63 y.o. female.  Referring Physician: No referring provider defined for this encounter.  Primary Care Provider: Heriberto Hebert MD  Visit Type: Initial Visit  Retroperitoneal lymphadenopathy.  Subjective    HPI  The patient was referred to me for further evaluation and management of retroperitoneal lymphadenopathy, evaluated on March 14, 2024.  The patient was doing well till October 25, 2023 when she noticed acute onset of sharp mid abdominal pain shortly after awakening from sleep.  It persisted through the day with associated bloating.  The patient worked the whole day but eventually presented to ED in [facility].  Did not have any fevers chills sweats nausea vomiting diarrhea constipation hematemesis melena hematochezia materia.  The patient was discharged on antibiotics after CT scans.  The patient claims that she is felt better bloating is resolved she does have minimal residual abdominal discomfort.  CT scan revealed aortocaval and left periaortic ill-defined soft tissue extending below the aortic bifurcation.  Differential diagnosis thought to be retroperitoneal fibrosis, idiopathic, not aneurysmal form of colonic aortitis or lymphoma.  There was no associated hydronephrosis.  A 2.8 cm mixed hyperattenuating and hypoattenuating mass was noted in the left lobe of the liver.  A tiny 7 mm lesion was noted in the lower pole of the right kidney.  A follow-up MRI at  revealed simple cyst in the right kidney 5 mm at the lower pole.  Hemangioma noted in the liver corresponding to the CT finding.  The retroperitoneal soft tissue fullness is confirmed and not significantly different than the CT imaging.  The patient has never had abdominal operation.CT-guided biopsy on January 12, 2024 revealed limited tissue with an atypical lymphoid infiltrate.  Flow cytometry on February 6, 2024 revealed CD5 negative, CD10 negative, kappa restricted B-cell population 4% of total events no  abnormal T-cell population identified.    At interview on March 14, 2024 the patient narrated entire history.  Denied history of weight loss, fevers, night sweats, chest pain, shortness of breath, nausea, vomiting, hematemesis, melena, hematochezia and hematuria.    The patient had called for follow-up on March 21, 2024 regarding history of abdominal pain and abnormal CT scans.  Currently the patient is asymptomatic but anxious to know the results of the tests performed.    Past medical history:    As above.    Past surgical history:  History of uterine ablation on March 2, 2020    Mammogram:    2 years ago.    Colonoscopy:    4 years ago.        Review of Systems   All other systems reviewed and are negative.       Objective   BSA: There is no height or weight on file to calculate BSA.  There were no vitals taken for this visit.     has no past medical history on file.   has a past surgical history that includes Other surgical history (03/02/2020).  Family History   Problem Relation Name Age of Onset    Breast cancer Other Grandparent      Oncology History    No history exists.       Cecelia Birmingham  reports that she has never smoked. She has never been exposed to tobacco smoke. She has never used smokeless tobacco.  She  reports current alcohol use of about 2.0 standard drinks of alcohol per week.  She  reports no history of drug use.    Physical Exam  Constitutional:       Appearance: Normal appearance.   HENT:      Head: Normocephalic and atraumatic.      Nose: Nose normal.      Mouth/Throat:      Mouth: Mucous membranes are moist.      Pharynx: Oropharynx is clear.   Eyes:      Extraocular Movements: Extraocular movements intact.      Conjunctiva/sclera: Conjunctivae normal.      Pupils: Pupils are equal, round, and reactive to light.   Cardiovascular:      Rate and Rhythm: Normal rate and regular rhythm.   Pulmonary:      Effort: Pulmonary effort is normal.      Breath sounds: Normal breath sounds.  "  Abdominal:      General: Abdomen is flat. Bowel sounds are normal.      Palpations: Abdomen is soft.   Musculoskeletal:         General: Normal range of motion.      Cervical back: Normal range of motion and neck supple.   Neurological:      General: No focal deficit present.      Mental Status: She is alert and oriented to person, place, and time. Mental status is at baseline.   Psychiatric:         Mood and Affect: Mood normal.         Behavior: Behavior normal.         Thought Content: Thought content normal.         Judgment: Judgment normal.         No results found for: \"WBC\"  No results found for: \"NRBC\"  No results found for: \"RBC\"  No results found for: \"HGB\"  No results found for: \"HCT\"  No results found for: \"MCV\"  No results found for: \"MCH\"  No results found for: \"MCHC\"  No results found for: \"RDW\"  Platelets   Date/Time Value Ref Range Status   01/12/2024 09:25  150 - 450 x10*3/uL Final     No results found for: \"MPV\"  No results found for: \"NEUTOPHILPCT\"  No results found for: \"IGPCT\"  No results found for: \"LYMPHOPCT\"  No results found for: \"MONOPCT\"  No results found for: \"EOSPCT\"  No results found for: \"BASOPCT\"  No results found for: \"NEUTROABS\"  No results found for: \"IGABSOL\"  No results found for: \"LYMPHSABS\"  No results found for: \"MONOSABS\"  No results found for: \"EOSABS\"  No results found for: \"BASOSABS\"    No components found for: \"PT\"  No results found for: \"APTT\"    Assessment/Plan      The patient is a 63-year-old woman who presented in October 2023 with abdominal pain.  She was evaluated at [facility] ED in [place] and the CT scan revealed possible retroperitoneal lymphadenopathy initial biopsy was inconclusive repeat biopsy with flow cytometry revealed CD5 negative, CD10 negative kappa restricted lymphocytes, 4% of events.  Would like to obtain more information and if necessary consider bone marrow aspiration and biopsy.  The patient is agreeable for follow-up and return in 4 " weeks.    The patient had called for follow-up on March 21, 2024 regarding history of abdominal pain and abnormal CT scan with retroperitoneal lymphadenopathy/thickening.  Physical examination was within normal limits.  I have personally discussed with Dr. Caicedo.  Initially a CT-guided biopsy was performed the specimen was inadequate.  Second biopsy flow cytometry was performed but once again it was inconclusive.  I explained to the patient above findings.  Options include    Repeat CT-guided biopsy again.    Do nothing.    Repeat blood work and CT scan of chest abdomen pelvis in 4 months and consider a biopsy if abnormalities are found.  The patient has requested follow-up in 4 months and CT scan and blood work.  The patient was appreciative of the details provided and grateful.    Thank you for allowing me to participate in care of your patient if you have any questions please feel free to call me.  Diagnoses and all orders for this visit:  Retroperitoneal lymphadenopathy  -     Referral to Hematology and Oncology         Jefferson Morales MD

## 2024-03-21 NOTE — TELEPHONE ENCOUNTER
Patient called in to check on 9am phone appt. She wasn't sure if she needed to call in or if the physician was calling her. I confirmed with her that he was still with the prior patient and he would call her once free. She understood.     She stated she starts work at 930am. She stated she is waiting in her car to take the call before going into work.

## 2024-05-20 ENCOUNTER — TELEPHONE (OUTPATIENT)
Dept: PRIMARY CARE | Facility: CLINIC | Age: 64
End: 2024-05-20
Payer: COMMERCIAL

## 2024-05-20 NOTE — TELEPHONE ENCOUNTER
Pt called would like to chat about prev biopsies and upcoming biopsies to [facility] office. Early morning. Sched for 6/27 would prefet sooner

## 2024-05-23 ENCOUNTER — OFFICE VISIT (OUTPATIENT)
Dept: PRIMARY CARE | Facility: CLINIC | Age: 64
End: 2024-05-23
Payer: COMMERCIAL

## 2024-05-23 VITALS
SYSTOLIC BLOOD PRESSURE: 120 MMHG | HEIGHT: 63 IN | WEIGHT: 114 LBS | DIASTOLIC BLOOD PRESSURE: 74 MMHG | HEART RATE: 72 BPM | BODY MASS INDEX: 20.2 KG/M2

## 2024-05-23 DIAGNOSIS — Z00.00 WELLNESS EXAMINATION: Primary | ICD-10-CM

## 2024-05-23 DIAGNOSIS — R93.5 ABNORMAL COMPUTERIZED AXIAL TOMOGRAPHY OF ABDOMEN: ICD-10-CM

## 2024-05-23 PROCEDURE — 99213 OFFICE O/P EST LOW 20 MIN: CPT | Performed by: INTERNAL MEDICINE

## 2024-05-23 NOTE — PROGRESS NOTES
"Subjective   Patient ID: Cecelia Birmingham is a 63 y.o. female who presents for DISCUSS BIOPSY (DISCUSS SX'S AND GETTING NEW LAB ORDER).    HPI FATIGUE   SEE BX X 2    SEE LABS     Review of Systems    Objective   /74   Pulse 72   Ht 1.6 m (5' 3\")   Wt 51.7 kg (114 lb)   BMI 20.19 kg/m²     Physical Exam  PE NO CHANGE  Assessment/Plan   Diagnoses and all orders for this visit:  Wellness examination  -     CBC; Future  -     TSH with reflex to Free T4 if abnormal; Future  -     Iron and TIBC; Future  -     Vitamin B12; Future  -     Basic metabolic panel; Future  Abnormal computerized axial tomography of abdomen    SEE CT SCAN      "

## 2024-05-24 ENCOUNTER — LAB (OUTPATIENT)
Dept: LAB | Facility: LAB | Age: 64
End: 2024-05-24
Payer: COMMERCIAL

## 2024-05-24 DIAGNOSIS — Z00.00 WELLNESS EXAMINATION: ICD-10-CM

## 2024-05-24 PROCEDURE — 80048 BASIC METABOLIC PNL TOTAL CA: CPT

## 2024-05-24 PROCEDURE — 36415 COLL VENOUS BLD VENIPUNCTURE: CPT

## 2024-05-24 PROCEDURE — 84443 ASSAY THYROID STIM HORMONE: CPT

## 2024-05-24 PROCEDURE — 85027 COMPLETE CBC AUTOMATED: CPT

## 2024-05-24 PROCEDURE — 82607 VITAMIN B-12: CPT

## 2024-05-24 PROCEDURE — 83550 IRON BINDING TEST: CPT

## 2024-05-24 PROCEDURE — 83540 ASSAY OF IRON: CPT

## 2024-05-25 LAB
ANION GAP SERPL CALC-SCNC: 15 MMOL/L (ref 10–20)
BUN SERPL-MCNC: 15 MG/DL (ref 6–23)
CALCIUM SERPL-MCNC: 8.7 MG/DL (ref 8.6–10.6)
CHLORIDE SERPL-SCNC: 101 MMOL/L (ref 98–107)
CO2 SERPL-SCNC: 25 MMOL/L (ref 21–32)
CREAT SERPL-MCNC: 0.6 MG/DL (ref 0.5–1.05)
EGFRCR SERPLBLD CKD-EPI 2021: >90 ML/MIN/1.73M*2
ERYTHROCYTE [DISTWIDTH] IN BLOOD BY AUTOMATED COUNT: 14.7 % (ref 11.5–14.5)
GLUCOSE SERPL-MCNC: 89 MG/DL (ref 74–99)
HCT VFR BLD AUTO: 35.1 % (ref 36–46)
HGB BLD-MCNC: 11.1 G/DL (ref 12–16)
IRON SATN MFR SERPL: 10 % (ref 25–45)
IRON SERPL-MCNC: 34 UG/DL (ref 35–150)
MCH RBC QN AUTO: 28.9 PG (ref 26–34)
MCHC RBC AUTO-ENTMCNC: 31.6 G/DL (ref 32–36)
MCV RBC AUTO: 91 FL (ref 80–100)
NRBC BLD-RTO: 0 /100 WBCS (ref 0–0)
PLATELET # BLD AUTO: 390 X10*3/UL (ref 150–450)
POTASSIUM SERPL-SCNC: 4.5 MMOL/L (ref 3.5–5.3)
RBC # BLD AUTO: 3.84 X10*6/UL (ref 4–5.2)
SODIUM SERPL-SCNC: 136 MMOL/L (ref 136–145)
TIBC SERPL-MCNC: 327 UG/DL (ref 240–445)
TSH SERPL-ACNC: 0.94 MIU/L (ref 0.44–3.98)
UIBC SERPL-MCNC: 293 UG/DL (ref 110–370)
VIT B12 SERPL-MCNC: 857 PG/ML (ref 211–911)
WBC # BLD AUTO: 5.3 X10*3/UL (ref 4.4–11.3)

## 2024-05-28 ENCOUNTER — TELEPHONE (OUTPATIENT)
Dept: PRIMARY CARE | Facility: CLINIC | Age: 64
End: 2024-05-28
Payer: COMMERCIAL

## 2024-06-05 ENCOUNTER — TELEPHONE (OUTPATIENT)
Dept: HEMATOLOGY/ONCOLOGY | Facility: CLINIC | Age: 64
End: 2024-06-05
Payer: COMMERCIAL

## 2024-06-05 NOTE — TELEPHONE ENCOUNTER
Patient calling to follow up on communication from Dr. Hebert to Dr. Morales.  Dr. Hebert is requesting a PET scan vs. CT Scan.  Patient informed Dr. Morales has not responded due to his vacation and will be back next week.  If Dr. Morales is agreeable, we will cancel CT scan on 07/26/24 and place order for PET Scan.  Patient would like to have all testing at [facility].  Told her we will check with Dr. Morales and notify her by next Friday as to what the plan is.  Patient was appreciative and agreeable.

## 2024-06-13 DIAGNOSIS — R93.5 ABNORMAL CT OF THE ABDOMEN: Primary | ICD-10-CM

## 2024-06-13 NOTE — TELEPHONE ENCOUNTER
PET ordered and scheduled for 6/24/24 at [place].  Aleyda states she will call pt with appt details today.

## 2024-06-24 ENCOUNTER — APPOINTMENT (OUTPATIENT)
Dept: RADIOLOGY | Facility: CLINIC | Age: 64
End: 2024-06-24
Payer: COMMERCIAL

## 2024-06-27 ENCOUNTER — LAB (OUTPATIENT)
Dept: LAB | Facility: LAB | Age: 64
End: 2024-06-27
Payer: COMMERCIAL

## 2024-06-27 ENCOUNTER — APPOINTMENT (OUTPATIENT)
Dept: PRIMARY CARE | Facility: CLINIC | Age: 64
End: 2024-06-27
Payer: COMMERCIAL

## 2024-06-27 DIAGNOSIS — R93.5 ABNORMAL CT OF THE ABDOMEN: ICD-10-CM

## 2024-06-27 DIAGNOSIS — R59.0 RETROPERITONEAL LYMPHADENOPATHY: ICD-10-CM

## 2024-06-27 LAB
ALBUMIN SERPL BCP-MCNC: 3.7 G/DL (ref 3.4–5)
ALP SERPL-CCNC: 68 U/L (ref 33–136)
ALT SERPL W P-5'-P-CCNC: 8 U/L (ref 7–45)
ANION GAP SERPL CALC-SCNC: 15 MMOL/L (ref 10–20)
AST SERPL W P-5'-P-CCNC: 12 U/L (ref 9–39)
B2 MICROGLOB SERPL-MCNC: 3.7 MG/L (ref 0.7–2.2)
BASOPHILS # BLD AUTO: 0.04 X10*3/UL (ref 0–0.1)
BASOPHILS NFR BLD AUTO: 0.7 %
BILIRUB SERPL-MCNC: 0.4 MG/DL (ref 0–1.2)
BUN SERPL-MCNC: 13 MG/DL (ref 6–23)
CALCIUM SERPL-MCNC: 9.3 MG/DL (ref 8.6–10.6)
CHLORIDE SERPL-SCNC: 101 MMOL/L (ref 98–107)
CO2 SERPL-SCNC: 27 MMOL/L (ref 21–32)
CREAT SERPL-MCNC: 0.67 MG/DL (ref 0.5–1.05)
EGFRCR SERPLBLD CKD-EPI 2021: >90 ML/MIN/1.73M*2
EOSINOPHIL # BLD AUTO: 0.08 X10*3/UL (ref 0–0.7)
EOSINOPHIL NFR BLD AUTO: 1.4 %
ERYTHROCYTE [DISTWIDTH] IN BLOOD BY AUTOMATED COUNT: 13.9 % (ref 11.5–14.5)
GLUCOSE SERPL-MCNC: 86 MG/DL (ref 74–99)
HCT VFR BLD AUTO: 35.3 % (ref 36–46)
HGB BLD-MCNC: 11.3 G/DL (ref 12–16)
IMM GRANULOCYTES # BLD AUTO: 0.01 X10*3/UL (ref 0–0.7)
IMM GRANULOCYTES NFR BLD AUTO: 0.2 % (ref 0–0.9)
LDH SERPL L TO P-CCNC: 126 U/L (ref 84–246)
LYMPHOCYTES # BLD AUTO: 0.95 X10*3/UL (ref 1.2–4.8)
LYMPHOCYTES NFR BLD AUTO: 17.1 %
MCH RBC QN AUTO: 28.1 PG (ref 26–34)
MCHC RBC AUTO-ENTMCNC: 32 G/DL (ref 32–36)
MCV RBC AUTO: 88 FL (ref 80–100)
MONOCYTES # BLD AUTO: 0.87 X10*3/UL (ref 0.1–1)
MONOCYTES NFR BLD AUTO: 15.6 %
NEUTROPHILS # BLD AUTO: 3.62 X10*3/UL (ref 1.2–7.7)
NEUTROPHILS NFR BLD AUTO: 65 %
NRBC BLD-RTO: 0 /100 WBCS (ref 0–0)
PLATELET # BLD AUTO: 399 X10*3/UL (ref 150–450)
POTASSIUM SERPL-SCNC: 4.7 MMOL/L (ref 3.5–5.3)
PROT SERPL-MCNC: 7.8 G/DL (ref 6.4–8.2)
PROT SERPL-MCNC: 7.8 G/DL (ref 6.4–8.2)
RBC # BLD AUTO: 4.02 X10*6/UL (ref 4–5.2)
SODIUM SERPL-SCNC: 138 MMOL/L (ref 136–145)
WBC # BLD AUTO: 5.6 X10*3/UL (ref 4.4–11.3)

## 2024-06-27 PROCEDURE — 83521 IG LIGHT CHAINS FREE EACH: CPT

## 2024-06-27 PROCEDURE — 80053 COMPREHEN METABOLIC PANEL: CPT

## 2024-06-27 PROCEDURE — 86334 IMMUNOFIX E-PHORESIS SERUM: CPT

## 2024-06-27 PROCEDURE — 36415 COLL VENOUS BLD VENIPUNCTURE: CPT

## 2024-06-27 PROCEDURE — 82232 ASSAY OF BETA-2 PROTEIN: CPT

## 2024-06-27 PROCEDURE — 84165 PROTEIN E-PHORESIS SERUM: CPT

## 2024-06-27 PROCEDURE — 85025 COMPLETE CBC W/AUTO DIFF WBC: CPT

## 2024-06-27 PROCEDURE — 83615 LACTATE (LD) (LDH) ENZYME: CPT

## 2024-06-27 PROCEDURE — 84155 ASSAY OF PROTEIN SERUM: CPT

## 2024-06-28 ENCOUNTER — TELEPHONE (OUTPATIENT)
Dept: HEMATOLOGY/ONCOLOGY | Facility: CLINIC | Age: 64
End: 2024-06-28
Payer: COMMERCIAL

## 2024-06-28 LAB
KAPPA LC SERPL-MCNC: 3.52 MG/DL (ref 0.33–1.94)
KAPPA LC/LAMBDA SER: 8 {RATIO} (ref 0.26–1.65)
LAMBDA LC SERPL-MCNC: 0.44 MG/DL (ref 0.57–2.63)

## 2024-06-28 NOTE — TELEPHONE ENCOUNTER
----- Message from Junie Mei MA sent at 6/28/2024  3:24 PM EDT -----  Can you please call?  ----- Message -----  From: Junie Mei MA  Sent: 6/28/2024   3:14 PM EDT  To: Gwendolyn Sumner RN      ----- Message -----  From: Jaye Guallpa  Sent: 6/28/2024   3:03 PM EDT  To: Junie Mei MA    Patient called and said she wanted to make sure Dr Morales reviewed her blood work before her scan she has scheduled.  She said she already saw them in [facility].

## 2024-06-28 NOTE — TELEPHONE ENCOUNTER
Patient called this afternoon with questions regarding PET scan and labs.  Call returned to patient.  Patient states she wanted to make sure Dr Morales saw her lab results prior to her PET.  Patient though she needed to do labs prior to PET.  Explained to patient that labs only required prior to CT scan although these labs were ordered by Dr Morales and are important for him to have.  Call back instructions reviewed.  Patient verbalized understanding.

## 2024-06-30 LAB
ALBUMIN: 3.7 G/DL (ref 3.4–5)
ALPHA 1 GLOBULIN: 0.4 G/DL (ref 0.2–0.6)
ALPHA 2 GLOBULIN: 0.6 G/DL (ref 0.4–1.1)
BETA GLOBULIN: 2.8 G/DL (ref 0.5–1.2)
GAMMA GLOBULIN: 0.3 G/DL (ref 0.5–1.4)
IMMUNOFIXATION COMMENT: ABNORMAL
M-PROTEIN 1: 2.2 G/DL
PATH REVIEW - SERUM IMMUNOFIXATION: ABNORMAL
PATH REVIEW-SERUM PROTEIN ELECTROPHORESIS: ABNORMAL
PROTEIN ELECTROPHORESIS COMMENT: ABNORMAL

## 2024-07-03 ENCOUNTER — TELEPHONE (OUTPATIENT)
Dept: PRIMARY CARE | Facility: CLINIC | Age: 64
End: 2024-07-03
Payer: COMMERCIAL

## 2024-07-03 DIAGNOSIS — R92.8 ABNORMAL MAMMOGRAM OF LEFT BREAST: ICD-10-CM

## 2024-07-03 NOTE — TELEPHONE ENCOUNTER
I did review they are abnormal however I'm not sure of any significance of that has to see dr alvarado

## 2024-07-03 NOTE — TELEPHONE ENCOUNTER
[facility] scheduling called. PT had mammogram 6/12/24.  Radiologist recommended Follow-up diagnostic mammogram Left breast and breast ultrasound if necessary.     Okay to place order?

## 2024-07-03 NOTE — TELEPHONE ENCOUNTER
PT wants to know if you will review her lab results that were ordered by Dr Morales. She saw them on [employer] and is concerned. States she doesn't see Dr Morales until August.     Will you review or does she need to wait for Dr Morales appt?      Ordered and faxed imaging to [phone]. Pt was notified.

## 2024-07-08 ENCOUNTER — HOSPITAL ENCOUNTER (OUTPATIENT)
Dept: RADIOLOGY | Facility: CLINIC | Age: 64
Discharge: HOME | End: 2024-07-08
Payer: COMMERCIAL

## 2024-07-08 DIAGNOSIS — R93.5 ABNORMAL CT OF THE ABDOMEN: ICD-10-CM

## 2024-07-08 PROCEDURE — 3430000001 HC RX 343 DIAGNOSTIC RADIOPHARMACEUTICALS: Performed by: INTERNAL MEDICINE

## 2024-07-08 PROCEDURE — 78815 PET IMAGE W/CT SKULL-THIGH: CPT | Mod: PI

## 2024-07-08 PROCEDURE — A9552 F18 FDG: HCPCS | Performed by: INTERNAL MEDICINE

## 2024-07-08 PROCEDURE — 78815 PET IMAGE W/CT SKULL-THIGH: CPT | Mod: PET TUMOR INIT TX STRAT | Performed by: RADIOLOGY

## 2024-07-08 RX ORDER — FLUDEOXYGLUCOSE F 18 200 MCI/ML
12.2 INJECTION, SOLUTION INTRAVENOUS
Status: COMPLETED | OUTPATIENT
Start: 2024-07-08 | End: 2024-07-08

## 2024-07-11 ENCOUNTER — OFFICE VISIT (OUTPATIENT)
Dept: HEMATOLOGY/ONCOLOGY | Facility: CLINIC | Age: 64
End: 2024-07-11
Payer: COMMERCIAL

## 2024-07-11 VITALS
HEART RATE: 64 BPM | DIASTOLIC BLOOD PRESSURE: 67 MMHG | SYSTOLIC BLOOD PRESSURE: 116 MMHG | TEMPERATURE: 97.7 F | BODY MASS INDEX: 19.92 KG/M2 | WEIGHT: 112.43 LBS | OXYGEN SATURATION: 99 % | RESPIRATION RATE: 16 BRPM

## 2024-07-11 DIAGNOSIS — R93.5 ABNORMAL CT OF THE ABDOMEN: ICD-10-CM

## 2024-07-11 DIAGNOSIS — R59.0 RETROPERITONEAL LYMPHADENOPATHY: ICD-10-CM

## 2024-07-11 PROCEDURE — 99215 OFFICE O/P EST HI 40 MIN: CPT | Performed by: INTERNAL MEDICINE

## 2024-07-11 RX ORDER — VIT C/E/ZN/COPPR/LUTEIN/ZEAXAN 250MG-90MG
25 CAPSULE ORAL DAILY
COMMUNITY

## 2024-07-11 RX ORDER — FERROUS SULFATE 325(65) MG
65 TABLET, DELAYED RELEASE (ENTERIC COATED) ORAL
COMMUNITY

## 2024-07-11 ASSESSMENT — PAIN SCALES - GENERAL: PAINLEVEL: 0-NO PAIN

## 2024-07-11 NOTE — PROGRESS NOTES
Patient for BMBX at [facility].  Patient notified that [facility] will call patient to review med list, schedule date/time, give further instructions, give directions, etc.  Reviewed PI with patient specifically when to call office and when to go to the Ed based on symptoms.  Patient verbalized understanding.

## 2024-07-11 NOTE — PROGRESS NOTES
Pt to have BMBX done at [facility]. Teaching reviewed regarding procedure, pt verbalizes understanding per teach back method. PI sheet for BMBX given and reviewed. Pt will call our office once she has scheduled BMBX to coordinate FUV with biopsy results. Order faxed to [facility] per Junie. Pt to F/U with Dr Morales 8/23/24 (pt. prefers Fridays d/t work). Pt given our office to call for any further issues or concerns.

## 2024-07-11 NOTE — PROGRESS NOTES
Patient ID: Cecelia Birmingham is a 63 y.o. female.  Referring Physician: Jefferson Morales MD  [address] [facility]  [place] [address]  Primary Care Provider: Heriberto Hebert MD  Visit Type: Initial Visit  Retroperitoneal lymphadenopathy.  Subjective    HPI  The patient was referred to me for further evaluation and management of retroperitoneal lymphadenopathy, evaluated on March 14, 2024.  The patient was doing well till October 25, 2023 when she noticed acute onset of sharp mid abdominal pain shortly after awakening from sleep.  It persisted through the day with associated bloating.  The patient worked the whole day but eventually presented to ED in [facility].  Did not have any fevers chills sweats nausea vomiting diarrhea constipation hematemesis melena hematochezia materia.  The patient was discharged on antibiotics after CT scans.  The patient claims that she is felt better bloating is resolved she does have minimal residual abdominal discomfort.  CT scan revealed aortocaval and left periaortic ill-defined soft tissue extending below the aortic bifurcation.  Differential diagnosis thought to be retroperitoneal fibrosis, idiopathic, not aneurysmal form of colonic aortitis or lymphoma.  There was no associated hydronephrosis.  A 2.8 cm mixed hyperattenuating and hypoattenuating mass was noted in the left lobe of the liver.  A tiny 7 mm lesion was noted in the lower pole of the right kidney.  A follow-up MRI at  revealed simple cyst in the right kidney 5 mm at the lower pole.  Hemangioma noted in the liver corresponding to the CT finding.  The retroperitoneal soft tissue fullness is confirmed and not significantly different than the CT imaging.  The patient has never had abdominal operation.CT-guided biopsy on January 12, 2024 revealed limited tissue with an atypical lymphoid infiltrate.  Flow cytometry on February 6, 2024 revealed CD5 negative, CD10 negative, kappa restricted B-cell  population 4% of total events no abnormal T-cell population identified.    At interview on March 14, 2024 the patient narrated entire history.  Denied history of weight loss, fevers, night sweats, chest pain, shortness of breath, nausea, vomiting, hematemesis, melena, hematochezia and hematuria.    The patient had called for follow-up on July 11, 2024 regarding history of abdominal pain and abnormal CT scans the patient complains of frontal headaches.  Was evaluated by Dr. Hebert and noted to have low iron saturation of 10%.  She was placed on ferrous sulfate 325 mg p.o. daily.  Headaches persist.  Also, patient had retroperitoneal lymphadenopathy and biopsies were attempted twice but inconclusive.  A PET scan was ordered.  Patient anxious to know results of the tests performed.    Past medical history:    As above.    Past surgical history:  History of uterine ablation on March 2, 2020    Mammogram:    2 years ago.    Colonoscopy:    4 years ago.        Review of Systems   All other systems reviewed and are negative.       Objective   BSA: 1.51 meters squared  /67   Pulse 64   Temp 36.5 °C (97.7 °F) (Temporal)   Resp 16   Wt 51 kg (112 lb 7 oz)   SpO2 99%   BMI 19.92 kg/m²      has no past medical history on file.   has a past surgical history that includes Other surgical history (03/02/2020).  Family History   Problem Relation Name Age of Onset    Breast cancer Other Grandparent      Oncology History    No history exists.       Cecelia Birmingham  reports that she has never smoked. She has never been exposed to tobacco smoke. She has never used smokeless tobacco.  She  reports current alcohol use of about 2.0 standard drinks of alcohol per week.  She  reports no history of drug use.    Physical Exam  Constitutional:       Appearance: Normal appearance.   HENT:      Head: Normocephalic and atraumatic.      Nose: Nose normal.      Mouth/Throat:      Mouth: Mucous membranes are moist.      Pharynx:  Oropharynx is clear.   Eyes:      Extraocular Movements: Extraocular movements intact.      Conjunctiva/sclera: Conjunctivae normal.      Pupils: Pupils are equal, round, and reactive to light.   Cardiovascular:      Rate and Rhythm: Normal rate and regular rhythm.   Pulmonary:      Effort: Pulmonary effort is normal.      Breath sounds: Normal breath sounds.   Abdominal:      General: Abdomen is flat. Bowel sounds are normal.      Palpations: Abdomen is soft.   Musculoskeletal:         General: Normal range of motion.      Cervical back: Normal range of motion and neck supple.   Neurological:      General: No focal deficit present.      Mental Status: She is alert and oriented to person, place, and time. Mental status is at baseline.   Psychiatric:         Mood and Affect: Mood normal.         Behavior: Behavior normal.         Thought Content: Thought content normal.         Judgment: Judgment normal.         WBC   Date/Time Value Ref Range Status   06/27/2024 08:23 AM 5.6 4.4 - 11.3 x10*3/uL Final   05/24/2024 10:23 AM 5.3 4.4 - 11.3 x10*3/uL Final     nRBC   Date Value Ref Range Status   06/27/2024 0.0 0.0 - 0.0 /100 WBCs Final   05/24/2024 0.0 0.0 - 0.0 /100 WBCs Final     RBC   Date Value Ref Range Status   06/27/2024 4.02 4.00 - 5.20 x10*6/uL Final   05/24/2024 3.84 (L) 4.00 - 5.20 x10*6/uL Final     Hemoglobin   Date Value Ref Range Status   06/27/2024 11.3 (L) 12.0 - 16.0 g/dL Final   05/24/2024 11.1 (L) 12.0 - 16.0 g/dL Final     Hematocrit   Date Value Ref Range Status   06/27/2024 35.3 (L) 36.0 - 46.0 % Final   05/24/2024 35.1 (L) 36.0 - 46.0 % Final     MCV   Date/Time Value Ref Range Status   06/27/2024 08:23 AM 88 80 - 100 fL Final   05/24/2024 10:23 AM 91 80 - 100 fL Final     MCH   Date/Time Value Ref Range Status   06/27/2024 08:23 AM 28.1 26.0 - 34.0 pg Final   05/24/2024 10:23 AM 28.9 26.0 - 34.0 pg Final     MCHC   Date/Time Value Ref Range Status   06/27/2024 08:23 AM 32.0 32.0 - 36.0 g/dL  "Final   05/24/2024 10:23 AM 31.6 (L) 32.0 - 36.0 g/dL Final     RDW   Date/Time Value Ref Range Status   06/27/2024 08:23 AM 13.9 11.5 - 14.5 % Final   05/24/2024 10:23 AM 14.7 (H) 11.5 - 14.5 % Final     Platelets   Date/Time Value Ref Range Status   06/27/2024 08:23  150 - 450 x10*3/uL Final   05/24/2024 10:23  150 - 450 x10*3/uL Final   01/12/2024 09:25  150 - 450 x10*3/uL Final     No results found for: \"MPV\"  Neutrophils %   Date/Time Value Ref Range Status   06/27/2024 08:23 AM 65.0 40.0 - 80.0 % Final     Immature Granulocytes %, Automated   Date/Time Value Ref Range Status   06/27/2024 08:23 AM 0.2 0.0 - 0.9 % Final     Comment:     Immature Granulocyte Count (IG) includes promyelocytes, myelocytes and metamyelocytes but does not include bands. Percent differential counts (%) should be interpreted in the context of the absolute cell counts (cells/UL).     Lymphocytes %   Date/Time Value Ref Range Status   06/27/2024 08:23 AM 17.1 13.0 - 44.0 % Final     Monocytes %   Date/Time Value Ref Range Status   06/27/2024 08:23 AM 15.6 2.0 - 10.0 % Final     Eosinophils %   Date/Time Value Ref Range Status   06/27/2024 08:23 AM 1.4 0.0 - 6.0 % Final     Basophils %   Date/Time Value Ref Range Status   06/27/2024 08:23 AM 0.7 0.0 - 2.0 % Final     Neutrophils Absolute   Date/Time Value Ref Range Status   06/27/2024 08:23 AM 3.62 1.20 - 7.70 x10*3/uL Final     Comment:     Percent differential counts (%) should be interpreted in the context of the absolute cell counts (cells/uL).     Immature Granulocytes Absolute, Automated   Date/Time Value Ref Range Status   06/27/2024 08:23 AM 0.01 0.00 - 0.70 x10*3/uL Final     Lymphocytes Absolute   Date/Time Value Ref Range Status   06/27/2024 08:23 AM 0.95 (L) 1.20 - 4.80 x10*3/uL Final     Monocytes Absolute   Date/Time Value Ref Range Status   06/27/2024 08:23 AM 0.87 0.10 - 1.00 x10*3/uL Final     Eosinophils Absolute   Date/Time Value Ref Range Status " "  06/27/2024 08:23 AM 0.08 0.00 - 0.70 x10*3/uL Final     Basophils Absolute   Date/Time Value Ref Range Status   06/27/2024 08:23 AM 0.04 0.00 - 0.10 x10*3/uL Final       No components found for: \"PT\"  No results found for: \"APTT\"    Assessment/Plan      The patient is a 63-year-old woman who presented in October 2023 with abdominal pain.  She was evaluated at [facility] ED in [place] and the CT scan revealed possible retroperitoneal lymphadenopathy initial biopsy was inconclusive repeat biopsy with flow cytometry revealed CD5 negative, CD10 negative kappa restricted lymphocytes, 4% of events.  Would like to obtain more information and if necessary consider bone marrow aspiration and biopsy.  The patient is agreeable for follow-up and return in 4 weeks.    The patient had called for follow-up on March 21, 2024 regarding history of abdominal pain and abnormal CT scan with retroperitoneal lymphadenopathy/thickening.  Physical examination was within normal limits.  I have personally discussed with Dr. Caicedo.  Initially a CT-guided biopsy was performed the specimen was inadequate.  Second biopsy flow cytometry was performed but once again it was inconclusive.  I explained to the patient above findings.  Options include    Repeat CT-guided biopsy again.    Do nothing.    Repeat blood work and CT scan of chest abdomen pelvis in 4 months and consider a biopsy if abnormalities are found.  The patient has requested follow-up in 4 months and CT scan and blood work.  The patient was appreciative of the details provided and grateful.  The patient had called for follow-up on July 11, 2024 regarding history of abdominal pain and abnormal CT scans the patient complains of frontal headaches.  Was evaluated by Dr. Hebert and noted to have low iron saturation of 10%.  She was placed on ferrous sulfate 325 mg p.o. daily.  Headaches persist.  Also, patient had retroperitoneal lymphadenopathy and biopsies were attempted twice but " inconclusive.  A PET scan was ordered.  Patient anxious to know results of the tests performed.  IgM kappa M protein 2.2 g/dL.  Very suspicious for low-grade lymphoma.  Recall that CT scan of abdomen pelvis had revealed retroperitoneal lymphadenopathy.  CT-guided biopsies attempted twice were inconclusive.  PET scan shows mild FDG avidity in bilateral axilla lymph nodes as well as retroperitoneal lymph nodes but no discrete mass.  If agreeable I have recommended a bone marrow aspiration and biopsy.    Anemia:    Previous hemoglobin 11.1 g/dL recent hemoglobin in June 2024 had improved up to 11.3 g/dL.  Iron saturation decreased at 10%.  The patient was placed on ferrous sulfate 325 mg p.o. daily by .  I had a detailed discussion with the patient and explained to her that her symptoms do not necessarily correlate with her hemoglobin levels or low iron saturation.  However, the patient is convinced that her headache is due to low iron level.  However, I have given her a benefit of doubt and recommended increasing ferrous sulfate 325 mg p.o. half an hour after food daily, slowly increase to 3 times a day reassess in 6 weeks.  I also explained to the patient that it generally takes 4 to 6 weeks for hemoglobin to normalize after initiating iron therapy and 3 months to replenish iron stores.  If the patient does correct with iron replacement therapy it would suggest iron deficiency and would recommend GI evaluation.  The patient is agreeable    Thank you for allowing me to participate in care of your patient if you have any questions please feel free to call me.  Diagnoses and all orders for this visit:  Retroperitoneal lymphadenopathy  -     Referral to Hematology and Oncology         Jefferson Morales MD

## 2024-07-22 ENCOUNTER — APPOINTMENT (OUTPATIENT)
Dept: RADIOLOGY | Facility: HOSPITAL | Age: 64
End: 2024-07-22
Payer: COMMERCIAL

## 2024-07-23 ENCOUNTER — APPOINTMENT (OUTPATIENT)
Dept: HEMATOLOGY/ONCOLOGY | Facility: CLINIC | Age: 64
End: 2024-07-23
Payer: COMMERCIAL

## 2024-07-26 ENCOUNTER — APPOINTMENT (OUTPATIENT)
Dept: RADIOLOGY | Facility: HOSPITAL | Age: 64
End: 2024-07-26
Payer: COMMERCIAL

## 2024-07-26 ENCOUNTER — APPOINTMENT (OUTPATIENT)
Dept: HEMATOLOGY/ONCOLOGY | Facility: CLINIC | Age: 64
End: 2024-07-26
Payer: COMMERCIAL

## 2024-08-02 ENCOUNTER — APPOINTMENT (OUTPATIENT)
Dept: HEMATOLOGY/ONCOLOGY | Facility: CLINIC | Age: 64
End: 2024-08-02
Payer: COMMERCIAL

## 2024-08-09 LAB
Lab: NORMAL
NON-UH HIE BMBX SEND OUT FLOW CYTOMETRY: NORMAL

## 2024-08-15 LAB
NON-UH HIE BMBX SEND OUT CHROM ANALYSIS: NORMAL
NON-UH HIE BMBX SEND OUT FISH: NORMAL

## 2024-08-22 ENCOUNTER — APPOINTMENT (OUTPATIENT)
Dept: HEMATOLOGY/ONCOLOGY | Facility: CLINIC | Age: 64
End: 2024-08-22
Payer: COMMERCIAL

## 2024-08-23 ENCOUNTER — LAB (OUTPATIENT)
Dept: LAB | Facility: CLINIC | Age: 64
End: 2024-08-23
Payer: COMMERCIAL

## 2024-08-23 ENCOUNTER — OFFICE VISIT (OUTPATIENT)
Dept: HEMATOLOGY/ONCOLOGY | Facility: CLINIC | Age: 64
End: 2024-08-23
Payer: COMMERCIAL

## 2024-08-23 ENCOUNTER — APPOINTMENT (OUTPATIENT)
Dept: HEMATOLOGY/ONCOLOGY | Facility: CLINIC | Age: 64
End: 2024-08-23
Payer: COMMERCIAL

## 2024-08-23 VITALS
DIASTOLIC BLOOD PRESSURE: 69 MMHG | RESPIRATION RATE: 16 BRPM | OXYGEN SATURATION: 99 % | BODY MASS INDEX: 19.96 KG/M2 | HEART RATE: 70 BPM | TEMPERATURE: 97.7 F | WEIGHT: 112.66 LBS | SYSTOLIC BLOOD PRESSURE: 113 MMHG

## 2024-08-23 DIAGNOSIS — R59.0 RETROPERITONEAL LYMPHADENOPATHY: ICD-10-CM

## 2024-08-23 DIAGNOSIS — R93.5 ABNORMAL CT OF THE ABDOMEN: ICD-10-CM

## 2024-08-23 LAB
BASOPHILS # BLD AUTO: 0.03 X10*3/UL (ref 0–0.1)
BASOPHILS NFR BLD AUTO: 0.4 %
EOSINOPHIL # BLD AUTO: 0.02 X10*3/UL (ref 0–0.7)
EOSINOPHIL NFR BLD AUTO: 0.3 %
ERYTHROCYTE [DISTWIDTH] IN BLOOD BY AUTOMATED COUNT: 14.1 % (ref 11.5–14.5)
HCT VFR BLD AUTO: 37.2 % (ref 36–46)
HGB BLD-MCNC: 12.3 G/DL (ref 12–16)
IMM GRANULOCYTES # BLD AUTO: 0.01 X10*3/UL (ref 0–0.7)
IMM GRANULOCYTES NFR BLD AUTO: 0.1 % (ref 0–0.9)
LYMPHOCYTES # BLD AUTO: 0.76 X10*3/UL (ref 1.2–4.8)
LYMPHOCYTES NFR BLD AUTO: 11.3 %
MCH RBC QN AUTO: 28.8 PG (ref 26–34)
MCHC RBC AUTO-ENTMCNC: 33.1 G/DL (ref 32–36)
MCV RBC AUTO: 87 FL (ref 80–100)
MONOCYTES # BLD AUTO: 0.72 X10*3/UL (ref 0.1–1)
MONOCYTES NFR BLD AUTO: 10.7 %
NEUTROPHILS # BLD AUTO: 5.17 X10*3/UL (ref 1.2–7.7)
NEUTROPHILS NFR BLD AUTO: 77.2 %
NRBC BLD-RTO: ABNORMAL /100{WBCS}
PLATELET # BLD AUTO: 349 X10*3/UL (ref 150–450)
RBC # BLD AUTO: 4.27 X10*6/UL (ref 4–5.2)
WBC # BLD AUTO: 6.7 X10*3/UL (ref 4.4–11.3)

## 2024-08-23 PROCEDURE — 83521 IG LIGHT CHAINS FREE EACH: CPT

## 2024-08-23 PROCEDURE — 80053 COMPREHEN METABOLIC PANEL: CPT

## 2024-08-23 PROCEDURE — 85810 BLOOD VISCOSITY EXAMINATION: CPT

## 2024-08-23 PROCEDURE — 85025 COMPLETE CBC W/AUTO DIFF WBC: CPT

## 2024-08-23 PROCEDURE — 84155 ASSAY OF PROTEIN SERUM: CPT

## 2024-08-23 PROCEDURE — 99215 OFFICE O/P EST HI 40 MIN: CPT | Performed by: INTERNAL MEDICINE

## 2024-08-23 PROCEDURE — 86334 IMMUNOFIX E-PHORESIS SERUM: CPT

## 2024-08-23 PROCEDURE — 83615 LACTATE (LD) (LDH) ENZYME: CPT

## 2024-08-23 PROCEDURE — 36415 COLL VENOUS BLD VENIPUNCTURE: CPT

## 2024-08-23 PROCEDURE — 82232 ASSAY OF BETA-2 PROTEIN: CPT

## 2024-08-23 ASSESSMENT — PAIN SCALES - GENERAL: PAINLEVEL: 0-NO PAIN

## 2024-08-23 NOTE — PROGRESS NOTES
"Patient seen by Dr. Juan today in clinic. Reinforcement education provided regarding next steps with plan of care.      PER DR. JUAN'S FUV NOTE TODAY: \"The patient had called for follow-up on August 23, 2024 regarding history of abdominal pain and abnormal CT scans the patient complains of frontal headaches.  Was evaluated by Dr. Hebert and noted to have low iron saturation of 10%.  She was placed on ferrous sulfate 325 mg p.o. daily.  Headaches persist.  Also, patient had retroperitoneal lymphadenopathy and biopsies were attempted twice but inconclusive.  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  I have recommended serum viscosity, ophthalmology evaluation for retinal exam.  The patient is symptomatic and requesting assistance.  Consider single agent Rituxan SQ every 4 weeks after completion of evaluation.  Patient to return in 4 weeks. \"    Pt escorted to lab for viscosity blood work today.  Pt wishes to be seen at the [facility] with Dr. Kenneth Oconnell for retinal exam.  Referral entered. Pt reports abdominal swelling and pain, intermittent 6/10, takes Aleve or Tylenol with not much relief.  Also reports Heachaches 6/10 behind eyes.  Denies night sweats, states she does get hot during the day but no sweats.  Denies weight loss. Dr. Juan advised  to continue with oral iron.  FUV in 4 weeks.  Patient verbalizes understanding of plan of care via teachback method.             "

## 2024-08-23 NOTE — PROGRESS NOTES
Patient ID: Cecelia Birmingham is a 64 y.o. female.  Referring Physician: Jefferson Morales MD  [address], [place] [address] [place] [address]  Primary Care Provider: Heriberto Hebert MD  Visit Type: Initial Visit  Bone marrow aspiration and biopsy on August 2, 2024 revealed hypercellular bone marrow with involvement by CD5, CD10 negative lymphoproliferative disorder.    Plasma cell population with cytoplasmic kappa excess 5% present.    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    IgM kappa 2.2 g/dL, beta-2 microglobulin 3.7 mcg/dL.  Chromosomal analysis revealed 46XX, +4, +6, add(6)(q12)x2,-8[2]/46XX del(6)(q12q25)[2]/46XX[17]  Retroperitoneal lymphadenopathy.  Subjective    HPI  The patient was referred to me for further evaluation and management of retroperitoneal lymphadenopathy, evaluated on March 14, 2024.  The patient was doing well till October 25, 2023 when she noticed acute onset of sharp mid abdominal pain shortly after awakening from sleep.  It persisted through the day with associated bloating.  The patient worked the whole day but eventually presented to ED in [facility].  Did not have any fevers chills sweats nausea vomiting diarrhea constipation hematemesis melena hematochezia materia.  The patient was discharged on antibiotics after CT scans.  The patient claims that she is felt better bloating is resolved she does have minimal residual abdominal discomfort.  CT scan revealed aortocaval and left periaortic ill-defined soft tissue extending below the aortic bifurcation.  Differential diagnosis thought to be retroperitoneal fibrosis, idiopathic, not aneurysmal form of colonic aortitis or lymphoma.  There was no associated hydronephrosis.  A 2.8 cm mixed hyperattenuating and hypoattenuating mass was noted in the left lobe of the liver.  A tiny 7 mm lesion was noted in the lower pole of the right kidney.  A follow-up MRI at  revealed  simple cyst in the right kidney 5 mm at the lower pole.  Hemangioma noted in the liver corresponding to the CT finding.  The retroperitoneal soft tissue fullness is confirmed and not significantly different than the CT imaging.  The patient has never had abdominal operation.CT-guided biopsy on January 12, 2024 revealed limited tissue with an atypical lymphoid infiltrate.  Flow cytometry on February 6, 2024 revealed CD5 negative, CD10 negative, kappa restricted B-cell population 4% of total events no abnormal T-cell population identified.    At interview on March 14, 2024 the patient narrated entire history.  Denied history of weight loss, fevers, night sweats, chest pain, shortness of breath, nausea, vomiting, hematemesis, melena, hematochezia and hematuria.    The patient had called for follow-up on July 11, 2024 regarding history of abdominal pain and abnormal CT scans the patient complains of frontal headaches.  Was evaluated by Dr. Hebert and noted to have low iron saturation of 10%.  She was placed on ferrous sulfate 325 mg p.o. daily.  Headaches persist.  Also, patient had retroperitoneal lymphadenopathy and biopsies were attempted twice but inconclusive.  A PET scan was ordered.  Patient anxious to know results of the tests performed.    The patient had called for follow-up on August 23, 2024 regarding history of abdominal pain and abnormal CT scans the patient complains of frontal headaches.  Was evaluated by Dr. Hebert and noted to have low iron saturation of 10%.  She was placed on ferrous sulfate 325 mg p.o. daily.  Headaches persist.  Also, patient had retroperitoneal lymphadenopathy and biopsies were attempted twice but inconclusive.  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    Past medical history:    As above.    Past surgical history:  History of uterine ablation on March 2, 2020    Mammogram:    2 years ago.    Colonoscopy:    4 years ago.        Review of Systems   All other systems reviewed and are negative.       Objective   BSA: 1.51 meters squared  /69   Pulse 70   Temp 36.5 °C (97.7 °F) (Temporal)   Resp 16   Wt 51.1 kg (112 lb 10.5 oz)   SpO2 99%   BMI 19.96 kg/m²      has no past medical history on file.   has a past surgical history that includes Other surgical history (03/02/2020).  Family History   Problem Relation Name Age of Onset    Breast cancer Other Grandparent      Oncology History    No history exists.       Cecelia Birmingham  reports that she has never smoked. She has never been exposed to tobacco smoke. She has never used smokeless tobacco.  She  reports current alcohol use of about 2.0 standard drinks of alcohol per week.  She  reports no history of drug use.    Physical Exam  Constitutional:       Appearance: Normal appearance.   HENT:      Head: Normocephalic and atraumatic.      Nose: Nose normal.      Mouth/Throat:      Mouth: Mucous membranes are moist.      Pharynx: Oropharynx is clear.   Eyes:      Extraocular Movements: Extraocular movements intact.      Conjunctiva/sclera: Conjunctivae normal.      Pupils: Pupils are equal, round, and reactive to light.   Cardiovascular:      Rate and Rhythm: Normal rate and regular rhythm.   Pulmonary:      Effort: Pulmonary effort is normal.      Breath sounds: Normal breath sounds.   Abdominal:      General: Abdomen is flat. Bowel sounds are normal.      Palpations: Abdomen is soft.   Musculoskeletal:         General: Normal range of motion.      Cervical back: Normal range of motion and neck supple.   Neurological:      General: No focal deficit present.      Mental Status: She is alert and oriented to person, place, and time. Mental status is at baseline.   Psychiatric:         Mood and Affect:  Mood normal.         Behavior: Behavior normal.         Thought Content: Thought content normal.         Judgment: Judgment normal.         WBC   Date/Time Value Ref Range Status   08/23/2024 11:41 AM 6.7 4.4 - 11.3 x10*3/uL Final   06/27/2024 08:23 AM 5.6 4.4 - 11.3 x10*3/uL Final   05/24/2024 10:23 AM 5.3 4.4 - 11.3 x10*3/uL Final     nRBC   Date Value Ref Range Status   08/23/2024   Final     Comment:     Not Measured   06/27/2024 0.0 0.0 - 0.0 /100 WBCs Final   05/24/2024 0.0 0.0 - 0.0 /100 WBCs Final     RBC   Date Value Ref Range Status   08/23/2024 4.27 4.00 - 5.20 x10*6/uL Final   06/27/2024 4.02 4.00 - 5.20 x10*6/uL Final   05/24/2024 3.84 (L) 4.00 - 5.20 x10*6/uL Final     Hemoglobin   Date Value Ref Range Status   08/23/2024 12.3 12.0 - 16.0 g/dL Final   06/27/2024 11.3 (L) 12.0 - 16.0 g/dL Final   05/24/2024 11.1 (L) 12.0 - 16.0 g/dL Final     Hematocrit   Date Value Ref Range Status   08/23/2024 37.2 36.0 - 46.0 % Final   06/27/2024 35.3 (L) 36.0 - 46.0 % Final   05/24/2024 35.1 (L) 36.0 - 46.0 % Final     MCV   Date/Time Value Ref Range Status   08/23/2024 11:41 AM 87 80 - 100 fL Final   06/27/2024 08:23 AM 88 80 - 100 fL Final   05/24/2024 10:23 AM 91 80 - 100 fL Final     MCH   Date/Time Value Ref Range Status   08/23/2024 11:41 AM 28.8 26.0 - 34.0 pg Final   06/27/2024 08:23 AM 28.1 26.0 - 34.0 pg Final   05/24/2024 10:23 AM 28.9 26.0 - 34.0 pg Final     MCHC   Date/Time Value Ref Range Status   08/23/2024 11:41 AM 33.1 32.0 - 36.0 g/dL Final   06/27/2024 08:23 AM 32.0 32.0 - 36.0 g/dL Final   05/24/2024 10:23 AM 31.6 (L) 32.0 - 36.0 g/dL Final     RDW   Date/Time Value Ref Range Status   08/23/2024 11:41 AM 14.1 11.5 - 14.5 % Final   06/27/2024 08:23 AM 13.9 11.5 - 14.5 % Final   05/24/2024 10:23 AM 14.7 (H) 11.5 - 14.5 % Final     Platelets   Date/Time Value Ref Range Status   08/23/2024 11:41  150 - 450 x10*3/uL Final   06/27/2024 08:23  150 - 450 x10*3/uL Final   05/24/2024 10:23 AM  "390 150 - 450 x10*3/uL Final     No results found for: \"MPV\"  Neutrophils %   Date/Time Value Ref Range Status   08/23/2024 11:41 AM 77.2 40.0 - 80.0 % Final   06/27/2024 08:23 AM 65.0 40.0 - 80.0 % Final     Immature Granulocytes %, Automated   Date/Time Value Ref Range Status   08/23/2024 11:41 AM 0.1 0.0 - 0.9 % Final     Comment:     Immature Granulocyte Count (IG) includes promyelocytes, myelocytes and metamyelocytes but does not include bands. Percent differential counts (%) should be interpreted in the context of the absolute cell counts (cells/UL).   06/27/2024 08:23 AM 0.2 0.0 - 0.9 % Final     Comment:     Immature Granulocyte Count (IG) includes promyelocytes, myelocytes and metamyelocytes but does not include bands. Percent differential counts (%) should be interpreted in the context of the absolute cell counts (cells/UL).     Lymphocytes %   Date/Time Value Ref Range Status   08/23/2024 11:41 AM 11.3 13.0 - 44.0 % Final   06/27/2024 08:23 AM 17.1 13.0 - 44.0 % Final     Monocytes %   Date/Time Value Ref Range Status   08/23/2024 11:41 AM 10.7 2.0 - 10.0 % Final   06/27/2024 08:23 AM 15.6 2.0 - 10.0 % Final     Eosinophils %   Date/Time Value Ref Range Status   08/23/2024 11:41 AM 0.3 0.0 - 6.0 % Final   06/27/2024 08:23 AM 1.4 0.0 - 6.0 % Final     Basophils %   Date/Time Value Ref Range Status   08/23/2024 11:41 AM 0.4 0.0 - 2.0 % Final   06/27/2024 08:23 AM 0.7 0.0 - 2.0 % Final     Neutrophils Absolute   Date/Time Value Ref Range Status   08/23/2024 11:41 AM 5.17 1.20 - 7.70 x10*3/uL Final     Comment:     Percent differential counts (%) should be interpreted in the context of the absolute cell counts (cells/uL).   06/27/2024 08:23 AM 3.62 1.20 - 7.70 x10*3/uL Final     Comment:     Percent differential counts (%) should be interpreted in the context of the absolute cell counts (cells/uL).     Immature Granulocytes Absolute, Automated   Date/Time Value Ref Range Status   08/23/2024 11:41 AM 0.01 0.00 " "- 0.70 x10*3/uL Final   06/27/2024 08:23 AM 0.01 0.00 - 0.70 x10*3/uL Final     Lymphocytes Absolute   Date/Time Value Ref Range Status   08/23/2024 11:41 AM 0.76 (L) 1.20 - 4.80 x10*3/uL Final   06/27/2024 08:23 AM 0.95 (L) 1.20 - 4.80 x10*3/uL Final     Monocytes Absolute   Date/Time Value Ref Range Status   08/23/2024 11:41 AM 0.72 0.10 - 1.00 x10*3/uL Final   06/27/2024 08:23 AM 0.87 0.10 - 1.00 x10*3/uL Final     Eosinophils Absolute   Date/Time Value Ref Range Status   08/23/2024 11:41 AM 0.02 0.00 - 0.70 x10*3/uL Final   06/27/2024 08:23 AM 0.08 0.00 - 0.70 x10*3/uL Final     Basophils Absolute   Date/Time Value Ref Range Status   08/23/2024 11:41 AM 0.03 0.00 - 0.10 x10*3/uL Final   06/27/2024 08:23 AM 0.04 0.00 - 0.10 x10*3/uL Final       No components found for: \"PT\"  No results found for: \"APTT\"    Assessment/Plan      The patient is a 63-year-old woman who presented in October 2023 with abdominal pain.  She was evaluated at [facility] ED in [place] and the CT scan revealed possible retroperitoneal lymphadenopathy initial biopsy was inconclusive repeat biopsy with flow cytometry revealed CD5 negative, CD10 negative kappa restricted lymphocytes, 4% of events.  Would like to obtain more information and if necessary consider bone marrow aspiration and biopsy.  The patient is agreeable for follow-up and return in 4 weeks.    The patient had called for follow-up on March 21, 2024 regarding history of abdominal pain and abnormal CT scan with retroperitoneal lymphadenopathy/thickening.  Physical examination was within normal limits.  I have personally discussed with Dr. Caicedo.  Initially a CT-guided biopsy was performed the specimen was inadequate.  Second biopsy flow cytometry was performed but once again it was inconclusive.  I explained to the patient above findings.  Options include    Repeat CT-guided biopsy again.    Do nothing.    Repeat blood work and CT scan of chest abdomen pelvis in 4 months and consider a " biopsy if abnormalities are found.  The patient has requested follow-up in 4 months and CT scan and blood work.  The patient was appreciative of the details provided and grateful.  The patient had called for follow-up on July 11, 2024 regarding history of abdominal pain and abnormal CT scans the patient complains of frontal headaches.  Was evaluated by Dr. Hebert and noted to have low iron saturation of 10%.  She was placed on ferrous sulfate 325 mg p.o. daily.  Headaches persist.  Also, patient had retroperitoneal lymphadenopathy and biopsies were attempted twice but inconclusive.  A PET scan was ordered.  Patient anxious to know results of the tests performed.  IgM kappa M protein 2.2 g/dL.  Very suspicious for low-grade lymphoma.  Recall that CT scan of abdomen pelvis had revealed retroperitoneal lymphadenopathy.  CT-guided biopsies attempted twice were inconclusive.  PET scan shows mild FDG avidity in bilateral axilla lymph nodes as well as retroperitoneal lymph nodes but no discrete mass.  If agreeable I have recommended a bone marrow aspiration and biopsy.  The patient had called for follow-up on August 23, 2024 regarding history of abdominal pain and abnormal CT scans the patient complains of frontal headaches.  Was evaluated by Dr. Hebert and noted to have low iron saturation of 10%.  She was placed on ferrous sulfate 325 mg p.o. daily.  Headaches persist.  Also, patient had retroperitoneal lymphadenopathy and biopsies were attempted twice but inconclusive.  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  I have recommended serum viscosity, ophthalmology evaluation for retinal exam.  The patient is symptomatic and requesting assistance.  Consider single agent Rituxan SQ every 4  weeks after completion of evaluation.  Patient to return in 4 weeks.    Anemia:    Previous hemoglobin 11.1 g/dL recent hemoglobin in June 2024 had improved up to 11.3 g/dL.  Iron saturation decreased at 10%.  The patient was placed on ferrous sulfate 325 mg p.o. daily by .  I had a detailed discussion with the patient and explained to her that her symptoms do not necessarily correlate with her hemoglobin levels or low iron saturation.  However, the patient is convinced that her headache is due to low iron level.  However, I have given her a benefit of doubt and recommended increasing ferrous sulfate 325 mg p.o. half an hour after food daily, slowly increase to 3 times a day reassess in 6 weeks.  I also explained to the patient that it generally takes 4 to 6 weeks for hemoglobin to normalize after initiating iron therapy and 3 months to replenish iron stores.  If the patient does correct with iron replacement therapy it would suggest iron deficiency and would recommend GI evaluation.  The patient is agreeable    Thank you for allowing me to participate in care of your patient if you have any questions please feel free to call me.  Diagnoses and all orders for this visit:  Retroperitoneal lymphadenopathy  -     Referral to Hematology and Oncology         Jefferson Morales MD

## 2024-08-24 LAB
ALBUMIN SERPL BCP-MCNC: 4.3 G/DL (ref 3.4–5)
ALP SERPL-CCNC: 73 U/L (ref 33–136)
ALT SERPL W P-5'-P-CCNC: 10 U/L (ref 7–45)
ANION GAP SERPL CALC-SCNC: 17 MMOL/L (ref 10–20)
AST SERPL W P-5'-P-CCNC: 11 U/L (ref 9–39)
B2 MICROGLOB SERPL-MCNC: 3.2 MG/L (ref 0.7–2.2)
BILIRUB SERPL-MCNC: 0.5 MG/DL (ref 0–1.2)
BUN SERPL-MCNC: 13 MG/DL (ref 6–23)
CALCIUM SERPL-MCNC: 9.9 MG/DL (ref 8.6–10.6)
CHLORIDE SERPL-SCNC: 101 MMOL/L (ref 98–107)
CO2 SERPL-SCNC: 27 MMOL/L (ref 21–32)
CREAT SERPL-MCNC: 0.59 MG/DL (ref 0.5–1.05)
EGFRCR SERPLBLD CKD-EPI 2021: >90 ML/MIN/1.73M*2
GLUCOSE SERPL-MCNC: 91 MG/DL (ref 74–99)
LDH SERPL L TO P-CCNC: 139 U/L (ref 84–246)
POTASSIUM SERPL-SCNC: 4.5 MMOL/L (ref 3.5–5.3)
PROT SERPL-MCNC: 7.8 G/DL (ref 6.4–8.2)
PROT SERPL-MCNC: 7.8 G/DL (ref 6.4–8.2)
SODIUM SERPL-SCNC: 140 MMOL/L (ref 136–145)

## 2024-08-25 LAB
KAPPA LC SERPL-MCNC: 2.94 MG/DL (ref 0.33–1.94)
KAPPA LC/LAMBDA SER: 8.4 {RATIO} (ref 0.26–1.65)
LAMBDA LC SERPL-MCNC: 0.35 MG/DL (ref 0.57–2.63)

## 2024-08-26 ENCOUNTER — TELEPHONE (OUTPATIENT)
Dept: HEMATOLOGY/ONCOLOGY | Facility: CLINIC | Age: 64
End: 2024-08-26
Payer: COMMERCIAL

## 2024-08-26 LAB — HETEROPH AB SER QL: 2.34 CP (ref 1.5–1.8)

## 2024-08-26 NOTE — TELEPHONE ENCOUNTER
Pt calling as she has seen the elevated Viscosity level reported from last week.      Reports appt with [facility] on 9/9/24.  Asking if she should continue with this plan of care or does she need to see Dr. Morales prior due to the elevated lab result?    Reports headaches continue but no change to her vision.    Lab results sent to Dr. Morales to review and to advise of plan of care. Informed pt I will call her back with Dr. Morales's advise. Patient verbalizes understanding of plan of care via teachback method.

## 2024-08-26 NOTE — TELEPHONE ENCOUNTER
Discussed current lab results with Dr. Morales over the phone from 8/23/24 along with patients continued headaches and appt with [facility] on 9/9/24 and FUV with Dr. Morales on 9/20/24.     Orders per Dr. Morales ok to offer sooner appt with him and he will discuss Rituxan subcutaneous treatments.      Call placed to patient and received an unidentified voicemail.  Message left for patient to call me tomorrow

## 2024-08-27 ENCOUNTER — TELEPHONE (OUTPATIENT)
Dept: SCHEDULING | Age: 64
End: 2024-08-27
Payer: COMMERCIAL

## 2024-08-27 NOTE — TELEPHONE ENCOUNTER
Patient called in at 8:28am requesting Gwendolyn. She stated she was returning a call to her. Gwendolyn was not available as they were in morning huddle. She stated she will call the patient after the meeting.     I advised patient that Gwendolyn was in a meeting and will call her back when she is out. She understood and agreed.

## 2024-08-27 NOTE — TELEPHONE ENCOUNTER
Call returned to patient.  Informed treatment plan was discussed yesterday with Dr. Morales.  Offered sooner appt with Dr. Morales on 9/12/24.  Pt declined and will continue with ophthalmologist on 9/9/24 and see Dr. Morales on 9/20/24.    Briefly discussed Rituxan as Dr. Morales is planning on entering a treatment plan this week.  Discussed a tentative start date for Rituxan on 9/21/24 as pt states she can only have treatment on Fridays due to work schedule.      Will follow up with patient again next week with additional teaching. Patient verbalizes understanding of plan of care via teachback method.

## 2024-08-28 LAB
ALBUMIN: 3.8 G/DL (ref 3.4–5)
ALPHA 1 GLOBULIN: 0.4 G/DL (ref 0.2–0.6)
ALPHA 2 GLOBULIN: 0.7 G/DL (ref 0.4–1.1)
BETA GLOBULIN: 2.6 G/DL (ref 0.5–1.2)
GAMMA GLOBULIN: 0.3 G/DL (ref 0.5–1.4)
IMMUNOFIXATION COMMENT: ABNORMAL
M-PROTEIN 1: 1.8 G/DL
PATH REVIEW - SERUM IMMUNOFIXATION: ABNORMAL
PATH REVIEW-SERUM PROTEIN ELECTROPHORESIS: ABNORMAL
PROTEIN ELECTROPHORESIS COMMENT: ABNORMAL

## 2024-08-29 RX ORDER — HEPARIN 100 UNIT/ML
500 SYRINGE INTRAVENOUS AS NEEDED
OUTPATIENT
Start: 2024-08-29

## 2024-08-29 RX ORDER — HEPARIN SODIUM,PORCINE/PF 10 UNIT/ML
50 SYRINGE (ML) INTRAVENOUS AS NEEDED
OUTPATIENT
Start: 2024-08-29

## 2024-09-05 ENCOUNTER — TELEPHONE (OUTPATIENT)
Dept: PRIMARY CARE | Facility: CLINIC | Age: 64
End: 2024-09-05
Payer: COMMERCIAL

## 2024-09-05 ENCOUNTER — PATIENT OUTREACH (OUTPATIENT)
Dept: HEMATOLOGY/ONCOLOGY | Facility: CLINIC | Age: 64
End: 2024-09-05
Payer: COMMERCIAL

## 2024-09-05 DIAGNOSIS — C85.80 MARGINAL ZONE B-CELL LYMPHOMA (MULTI): Primary | ICD-10-CM

## 2024-09-05 RX ORDER — PROCHLORPERAZINE EDISYLATE 5 MG/ML
10 INJECTION INTRAMUSCULAR; INTRAVENOUS EVERY 6 HOURS PRN
OUTPATIENT
Start: 2024-10-11

## 2024-09-05 RX ORDER — FAMOTIDINE 10 MG/ML
20 INJECTION INTRAVENOUS ONCE AS NEEDED
OUTPATIENT
Start: 2024-10-11

## 2024-09-05 RX ORDER — ACETAMINOPHEN 325 MG/1
650 TABLET ORAL ONCE
OUTPATIENT
Start: 2024-10-11

## 2024-09-05 RX ORDER — PROCHLORPERAZINE MALEATE 10 MG
10 TABLET ORAL EVERY 6 HOURS PRN
OUTPATIENT
Start: 2024-09-27

## 2024-09-05 RX ORDER — PROCHLORPERAZINE MALEATE 10 MG
10 TABLET ORAL EVERY 6 HOURS PRN
OUTPATIENT
Start: 2024-10-04

## 2024-09-05 RX ORDER — PROCHLORPERAZINE EDISYLATE 5 MG/ML
10 INJECTION INTRAMUSCULAR; INTRAVENOUS EVERY 6 HOURS PRN
OUTPATIENT
Start: 2024-09-20

## 2024-09-05 RX ORDER — ALBUTEROL SULFATE 0.83 MG/ML
3 SOLUTION RESPIRATORY (INHALATION) AS NEEDED
OUTPATIENT
Start: 2024-10-04

## 2024-09-05 RX ORDER — EPINEPHRINE 0.3 MG/.3ML
0.3 INJECTION SUBCUTANEOUS EVERY 5 MIN PRN
OUTPATIENT
Start: 2024-09-27

## 2024-09-05 RX ORDER — ACETAMINOPHEN 325 MG/1
650 TABLET ORAL ONCE
OUTPATIENT
Start: 2024-09-27

## 2024-09-05 RX ORDER — DIPHENHYDRAMINE HYDROCHLORIDE 50 MG/ML
50 INJECTION INTRAMUSCULAR; INTRAVENOUS AS NEEDED
OUTPATIENT
Start: 2024-09-20

## 2024-09-05 RX ORDER — ACETAMINOPHEN 325 MG/1
650 TABLET ORAL ONCE
OUTPATIENT
Start: 2024-10-04

## 2024-09-05 RX ORDER — PROCHLORPERAZINE MALEATE 10 MG
10 TABLET ORAL EVERY 6 HOURS PRN
OUTPATIENT
Start: 2024-10-11

## 2024-09-05 RX ORDER — DIPHENHYDRAMINE HYDROCHLORIDE 50 MG/ML
50 INJECTION INTRAMUSCULAR; INTRAVENOUS AS NEEDED
OUTPATIENT
Start: 2024-10-04

## 2024-09-05 RX ORDER — EPINEPHRINE 0.3 MG/.3ML
0.3 INJECTION SUBCUTANEOUS EVERY 5 MIN PRN
OUTPATIENT
Start: 2024-10-11

## 2024-09-05 RX ORDER — ACETAMINOPHEN 325 MG/1
650 TABLET ORAL ONCE
OUTPATIENT
Start: 2024-09-20

## 2024-09-05 RX ORDER — ALBUTEROL SULFATE 0.83 MG/ML
3 SOLUTION RESPIRATORY (INHALATION) AS NEEDED
OUTPATIENT
Start: 2024-10-11

## 2024-09-05 RX ORDER — FAMOTIDINE 10 MG/ML
20 INJECTION INTRAVENOUS ONCE AS NEEDED
OUTPATIENT
Start: 2024-09-20

## 2024-09-05 RX ORDER — PROCHLORPERAZINE EDISYLATE 5 MG/ML
10 INJECTION INTRAMUSCULAR; INTRAVENOUS EVERY 6 HOURS PRN
OUTPATIENT
Start: 2024-10-04

## 2024-09-05 RX ORDER — DIPHENHYDRAMINE HCL 50 MG
50 CAPSULE ORAL ONCE
OUTPATIENT
Start: 2024-09-27

## 2024-09-05 RX ORDER — EPINEPHRINE 0.3 MG/.3ML
0.3 INJECTION SUBCUTANEOUS EVERY 5 MIN PRN
OUTPATIENT
Start: 2024-09-20

## 2024-09-05 RX ORDER — FAMOTIDINE 10 MG/ML
20 INJECTION INTRAVENOUS ONCE AS NEEDED
OUTPATIENT
Start: 2024-09-27

## 2024-09-05 RX ORDER — FAMOTIDINE 10 MG/ML
20 INJECTION INTRAVENOUS ONCE AS NEEDED
OUTPATIENT
Start: 2024-10-04

## 2024-09-05 RX ORDER — PROCHLORPERAZINE MALEATE 10 MG
10 TABLET ORAL EVERY 6 HOURS PRN
OUTPATIENT
Start: 2024-09-20

## 2024-09-05 RX ORDER — DIPHENHYDRAMINE HYDROCHLORIDE 50 MG/ML
50 INJECTION INTRAMUSCULAR; INTRAVENOUS AS NEEDED
OUTPATIENT
Start: 2024-09-27

## 2024-09-05 RX ORDER — ALBUTEROL SULFATE 0.83 MG/ML
3 SOLUTION RESPIRATORY (INHALATION) AS NEEDED
OUTPATIENT
Start: 2024-09-20

## 2024-09-05 RX ORDER — PROCHLORPERAZINE EDISYLATE 5 MG/ML
10 INJECTION INTRAMUSCULAR; INTRAVENOUS EVERY 6 HOURS PRN
OUTPATIENT
Start: 2024-09-27

## 2024-09-05 RX ORDER — EPINEPHRINE 0.3 MG/.3ML
0.3 INJECTION SUBCUTANEOUS EVERY 5 MIN PRN
OUTPATIENT
Start: 2024-10-04

## 2024-09-05 RX ORDER — DIPHENHYDRAMINE HCL 50 MG
50 CAPSULE ORAL ONCE
OUTPATIENT
Start: 2024-10-04

## 2024-09-05 RX ORDER — DIPHENHYDRAMINE HYDROCHLORIDE 50 MG/ML
50 INJECTION INTRAMUSCULAR; INTRAVENOUS AS NEEDED
OUTPATIENT
Start: 2024-10-11

## 2024-09-05 RX ORDER — DIPHENHYDRAMINE HCL 50 MG
50 CAPSULE ORAL ONCE
OUTPATIENT
Start: 2024-10-11

## 2024-09-05 RX ORDER — DIPHENHYDRAMINE HCL 50 MG
50 CAPSULE ORAL ONCE
OUTPATIENT
Start: 2024-09-20

## 2024-09-05 RX ORDER — ALBUTEROL SULFATE 0.83 MG/ML
3 SOLUTION RESPIRATORY (INHALATION) AS NEEDED
OUTPATIENT
Start: 2024-09-27

## 2024-09-05 SDOH — ECONOMIC STABILITY: FOOD INSECURITY: WITHIN THE PAST 12 MONTHS, THE FOOD YOU BOUGHT JUST DIDN'T LAST AND YOU DIDN'T HAVE MONEY TO GET MORE.: NEVER TRUE

## 2024-09-05 SDOH — ECONOMIC STABILITY: TRANSPORTATION INSECURITY
IN THE PAST 12 MONTHS, HAS LACK OF TRANSPORTATION KEPT YOU FROM MEETINGS, WORK, OR FROM GETTING THINGS NEEDED FOR DAILY LIVING?: NO

## 2024-09-05 SDOH — ECONOMIC STABILITY: FOOD INSECURITY: WITHIN THE PAST 12 MONTHS, YOU WORRIED THAT YOUR FOOD WOULD RUN OUT BEFORE YOU GOT MONEY TO BUY MORE.: NEVER TRUE

## 2024-09-05 SDOH — ECONOMIC STABILITY: INCOME INSECURITY: IN THE LAST 12 MONTHS, WAS THERE A TIME WHEN YOU WERE NOT ABLE TO PAY THE MORTGAGE OR RENT ON TIME?: NO

## 2024-09-05 SDOH — ECONOMIC STABILITY: GENERAL
WHICH OF THE FOLLOWING DO YOU KNOW HOW TO USE AND HAVE ACCESS TO EVERY DAY? (CHOOSE ALL THAT APPLY): DESKTOP COMPUTER, LAPTOP COMPUTER, OR TABLET WITH BROADBAND INTERNET CONNECTION;SMARTPHONE WITH CELLULAR DATA PLAN

## 2024-09-05 SDOH — HEALTH STABILITY: PHYSICAL HEALTH: ON AVERAGE, HOW MANY DAYS PER WEEK DO YOU ENGAGE IN MODERATE TO STRENUOUS EXERCISE (LIKE A BRISK WALK)?: 4 DAYS

## 2024-09-05 SDOH — ECONOMIC STABILITY: TRANSPORTATION INSECURITY
IN THE PAST 12 MONTHS, HAS THE LACK OF TRANSPORTATION KEPT YOU FROM MEDICAL APPOINTMENTS OR FROM GETTING MEDICATIONS?: NO

## 2024-09-05 SDOH — HEALTH STABILITY: PHYSICAL HEALTH: ON AVERAGE, HOW MANY MINUTES DO YOU ENGAGE IN EXERCISE AT THIS LEVEL?: 30 MIN

## 2024-09-05 ASSESSMENT — SOCIAL DETERMINANTS OF HEALTH (SDOH)
HOW HARD IS IT FOR YOU TO PAY FOR THE VERY BASICS LIKE FOOD, HOUSING, MEDICAL CARE, AND HEATING?: NOT HARD AT ALL
WITHIN THE LAST YEAR, HAVE TO BEEN RAPED OR FORCED TO HAVE ANY KIND OF SEXUAL ACTIVITY BY YOUR PARTNER OR EX-PARTNER?: NO
IN A TYPICAL WEEK, HOW MANY TIMES DO YOU TALK ON THE PHONE WITH FAMILY, FRIENDS, OR NEIGHBORS?: MORE THAN THREE TIMES A WEEK
HOW OFTEN DO YOU GET TOGETHER WITH FRIENDS OR RELATIVES?: ONCE A WEEK
WITHIN THE LAST YEAR, HAVE YOU BEEN HUMILIATED OR EMOTIONALLY ABUSED IN OTHER WAYS BY YOUR PARTNER OR EX-PARTNER?: NO
HOW OFTEN DO YOU ATTEND CHURCH OR RELIGIOUS SERVICES?: 1 TO 4 TIMES PER YEAR
HOW OFTEN DO YOU ATTENT MEETINGS OF THE CLUB OR ORGANIZATION YOU BELONG TO?: MORE THAN 4 TIMES PER YEAR
WITHIN THE LAST YEAR, HAVE YOU BEEN AFRAID OF YOUR PARTNER OR EX-PARTNER?: NO
IN THE PAST 12 MONTHS, HAS THE ELECTRIC, GAS, OIL, OR WATER COMPANY THREATENED TO SHUT OFF SERVICE IN YOUR HOME?: NO
DO YOU BELONG TO ANY CLUBS OR ORGANIZATIONS SUCH AS CHURCH GROUPS UNIONS, FRATERNAL OR ATHLETIC GROUPS, OR SCHOOL GROUPS?: YES
WITHIN THE LAST YEAR, HAVE YOU BEEN KICKED, HIT, SLAPPED, OR OTHERWISE PHYSICALLY HURT BY YOUR PARTNER OR EX-PARTNER?: NO

## 2024-09-05 ASSESSMENT — LIFESTYLE VARIABLES
HOW MANY STANDARD DRINKS CONTAINING ALCOHOL DO YOU HAVE ON A TYPICAL DAY: 1 OR 2
AUDIT-C TOTAL SCORE: 3
HOW OFTEN DO YOU HAVE A DRINK CONTAINING ALCOHOL: 2-3 TIMES A WEEK
SKIP TO QUESTIONS 9-10: 1
HOW OFTEN DO YOU HAVE SIX OR MORE DRINKS ON ONE OCCASION: NEVER

## 2024-09-05 NOTE — TELEPHONE ENCOUNTER
Call placed to patient to advise of new appts and orders for Rituxan.      Aware FUV with Dr. Morales is being rescheduled to 9/13/24 @ 9:00, arrival at 8:40.  Rituxan consent and pre treatment labs will be done during this visit.    1st dose Rituxan will be on 9/20/24.  Informed Rituxan by itself is pretty well tolerated.  Aware of Rituxan cycle weekly x 4 at this time.  Discussed possible infusion reaction especially with first dose.  Ways to manage fatigue with 64 oz fluid daily and balance of rest and activity and at least 30 minutes of walking daily.      Compazine sent to patient's pharmacy on file however pt asking for zofran as she was unable to tolerate compazine in the past due to drowsiness.  Message sent to Dr. Morales to send Zofran script.    Information to be given on 9/13/24 with FUV:  Disease binder  Red Sheet  Rituxan PI sheet  Chemo class flyer    Pt agreeable to plan of care.  Patient verbalizes understanding of plan of care via teachback method.

## 2024-09-05 NOTE — TELEPHONE ENCOUNTER
----- Message from Heriberto Hebert sent at 9/5/2024 11:20 AM EDT -----  Nl  ----- Message -----  From: Eilene Horn MA  Sent: 9/5/2024  11:07 AM EDT  To: Heriberto Hebert MD

## 2024-09-06 DIAGNOSIS — C85.80 MARGINAL ZONE B-CELL LYMPHOMA (MULTI): Primary | ICD-10-CM

## 2024-09-06 RX ORDER — ONDANSETRON HYDROCHLORIDE 8 MG/1
8 TABLET, FILM COATED ORAL EVERY 8 HOURS PRN
Qty: 30 TABLET | Refills: 5 | Status: SHIPPED | OUTPATIENT
Start: 2024-09-06 | End: 2024-09-13

## 2024-09-13 ENCOUNTER — TELEPHONE (OUTPATIENT)
Dept: SCHEDULING | Age: 64
End: 2024-09-13

## 2024-09-13 ENCOUNTER — LAB (OUTPATIENT)
Dept: LAB | Facility: CLINIC | Age: 64
End: 2024-09-13
Payer: COMMERCIAL

## 2024-09-13 ENCOUNTER — OFFICE VISIT (OUTPATIENT)
Dept: HEMATOLOGY/ONCOLOGY | Facility: CLINIC | Age: 64
End: 2024-09-13
Payer: COMMERCIAL

## 2024-09-13 VITALS
BODY MASS INDEX: 19.94 KG/M2 | DIASTOLIC BLOOD PRESSURE: 75 MMHG | RESPIRATION RATE: 12 BRPM | WEIGHT: 112.55 LBS | TEMPERATURE: 97 F | HEART RATE: 66 BPM | SYSTOLIC BLOOD PRESSURE: 125 MMHG | OXYGEN SATURATION: 97 %

## 2024-09-13 DIAGNOSIS — R93.5 ABNORMAL CT OF THE ABDOMEN: ICD-10-CM

## 2024-09-13 DIAGNOSIS — C85.80 MARGINAL ZONE B-CELL LYMPHOMA (MULTI): Primary | ICD-10-CM

## 2024-09-13 DIAGNOSIS — C85.80 MARGINAL ZONE B-CELL LYMPHOMA (MULTI): ICD-10-CM

## 2024-09-13 DIAGNOSIS — R59.0 RETROPERITONEAL LYMPHADENOPATHY: ICD-10-CM

## 2024-09-13 LAB
ALBUMIN SERPL BCP-MCNC: 4.1 G/DL (ref 3.4–5)
ALP SERPL-CCNC: 74 U/L (ref 33–136)
ALT SERPL W P-5'-P-CCNC: 9 U/L (ref 7–45)
ANION GAP SERPL CALC-SCNC: 14 MMOL/L (ref 10–20)
AST SERPL W P-5'-P-CCNC: 11 U/L (ref 9–39)
BASOPHILS # BLD AUTO: 0.02 X10*3/UL (ref 0–0.1)
BASOPHILS NFR BLD AUTO: 0.3 %
BILIRUB SERPL-MCNC: 0.6 MG/DL (ref 0–1.2)
BUN SERPL-MCNC: 13 MG/DL (ref 6–23)
CALCIUM SERPL-MCNC: 9.6 MG/DL (ref 8.6–10.6)
CHLORIDE SERPL-SCNC: 101 MMOL/L (ref 98–107)
CO2 SERPL-SCNC: 29 MMOL/L (ref 21–32)
CREAT SERPL-MCNC: 0.61 MG/DL (ref 0.5–1.05)
EGFRCR SERPLBLD CKD-EPI 2021: >90 ML/MIN/1.73M*2
EOSINOPHIL # BLD AUTO: 0.05 X10*3/UL (ref 0–0.7)
EOSINOPHIL NFR BLD AUTO: 0.7 %
ERYTHROCYTE [DISTWIDTH] IN BLOOD BY AUTOMATED COUNT: 14.4 % (ref 11.5–14.5)
GLUCOSE SERPL-MCNC: 116 MG/DL (ref 74–99)
HBV CORE AB SER QL: NONREACTIVE
HBV SURFACE AB SER-ACNC: <3.1 MIU/ML
HBV SURFACE AG SERPL QL IA: NONREACTIVE
HCT VFR BLD AUTO: 37.7 % (ref 36–46)
HGB BLD-MCNC: 12.5 G/DL (ref 12–16)
IMM GRANULOCYTES # BLD AUTO: 0.01 X10*3/UL (ref 0–0.7)
IMM GRANULOCYTES NFR BLD AUTO: 0.1 % (ref 0–0.9)
LDH SERPL L TO P-CCNC: 121 U/L (ref 84–246)
LYMPHOCYTES # BLD AUTO: 0.98 X10*3/UL (ref 1.2–4.8)
LYMPHOCYTES NFR BLD AUTO: 14.6 %
MCH RBC QN AUTO: 29 PG (ref 26–34)
MCHC RBC AUTO-ENTMCNC: 33.2 G/DL (ref 32–36)
MCV RBC AUTO: 88 FL (ref 80–100)
MONOCYTES # BLD AUTO: 0.84 X10*3/UL (ref 0.1–1)
MONOCYTES NFR BLD AUTO: 12.5 %
NEUTROPHILS # BLD AUTO: 4.8 X10*3/UL (ref 1.2–7.7)
NEUTROPHILS NFR BLD AUTO: 71.8 %
NRBC BLD-RTO: ABNORMAL /100{WBCS}
PLATELET # BLD AUTO: 380 X10*3/UL (ref 150–450)
POTASSIUM SERPL-SCNC: 4.7 MMOL/L (ref 3.5–5.3)
PROT SERPL-MCNC: 7.8 G/DL (ref 6.4–8.2)
PROT SERPL-MCNC: 7.8 G/DL (ref 6.4–8.2)
RBC # BLD AUTO: 4.31 X10*6/UL (ref 4–5.2)
SODIUM SERPL-SCNC: 139 MMOL/L (ref 136–145)
URATE SERPL-MCNC: 4.5 MG/DL (ref 2.3–6.7)
WBC # BLD AUTO: 6.7 X10*3/UL (ref 4.4–11.3)

## 2024-09-13 PROCEDURE — 83615 LACTATE (LD) (LDH) ENZYME: CPT

## 2024-09-13 PROCEDURE — 84550 ASSAY OF BLOOD/URIC ACID: CPT

## 2024-09-13 PROCEDURE — 99215 OFFICE O/P EST HI 40 MIN: CPT | Performed by: INTERNAL MEDICINE

## 2024-09-13 PROCEDURE — 86334 IMMUNOFIX E-PHORESIS SERUM: CPT

## 2024-09-13 PROCEDURE — 82232 ASSAY OF BETA-2 PROTEIN: CPT

## 2024-09-13 PROCEDURE — 86704 HEP B CORE ANTIBODY TOTAL: CPT

## 2024-09-13 PROCEDURE — 85025 COMPLETE CBC W/AUTO DIFF WBC: CPT

## 2024-09-13 PROCEDURE — 86706 HEP B SURFACE ANTIBODY: CPT

## 2024-09-13 PROCEDURE — 84075 ASSAY ALKALINE PHOSPHATASE: CPT

## 2024-09-13 PROCEDURE — 36415 COLL VENOUS BLD VENIPUNCTURE: CPT

## 2024-09-13 PROCEDURE — 83521 IG LIGHT CHAINS FREE EACH: CPT

## 2024-09-13 PROCEDURE — 87340 HEPATITIS B SURFACE AG IA: CPT

## 2024-09-13 ASSESSMENT — PAIN SCALES - GENERAL: PAINLEVEL: 0-NO PAIN

## 2024-09-13 NOTE — PROGRESS NOTES
Patient ID: Cecelia Birmingham is a 64 y.o. female.  Referring Physician: Jefferson Morales MD  [facility], [place] [address] [place] [address]  Primary Care Provider: Heriberto Hebert MD  Visit Type: Initial Visit  Bone marrow aspiration and biopsy on August 2, 2024 revealed hypercellular bone marrow with involvement by CD5, CD10 negative lymphoproliferative disorder.    Plasma cell population with cytoplasmic kappa excess 5% present.    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    IgM kappa 2.2 g/dL, beta-2 microglobulin 3.7 mcg/dL.  Chromosomal analysis revealed 46XX, +4, +6, add(6)(q12)x2,-8[2]/46XX del(6)(q12q25)[2]/46XX[17]  Retroperitoneal lymphadenopathy.  Subjective    HPI  The patient was referred to me for further evaluation and management of retroperitoneal lymphadenopathy, evaluated on March 14, 2024.  The patient was doing well till October 25, 2023 when she noticed acute onset of sharp mid abdominal pain shortly after awakening from sleep.  It persisted through the day with associated bloating.  The patient worked the whole day but eventually presented to ED in [facility].  Did not have any fevers chills sweats nausea vomiting diarrhea constipation hematemesis melena hematochezia materia.  The patient was discharged on antibiotics after CT scans.  The patient claims that she is felt better bloating is resolved she does have minimal residual abdominal discomfort.  CT scan revealed aortocaval and left periaortic ill-defined soft tissue extending below the aortic bifurcation.  Differential diagnosis thought to be retroperitoneal fibrosis, idiopathic, not aneurysmal form of colonic aortitis or lymphoma.  There was no associated hydronephrosis.  A 2.8 cm mixed hyperattenuating and hypoattenuating mass was noted in the left lobe of the liver.  A tiny 7 mm lesion was noted in the lower pole of the right kidney.  A follow-up MRI at  revealed  simple cyst in the right kidney 5 mm at the lower pole.  Hemangioma noted in the liver corresponding to the CT finding.  The retroperitoneal soft tissue fullness is confirmed and not significantly different than the CT imaging.  The patient has never had abdominal operation.CT-guided biopsy on January 12, 2024 revealed limited tissue with an atypical lymphoid infiltrate.  Flow cytometry on February 6, 2024 revealed CD5 negative, CD10 negative, kappa restricted B-cell population 4% of total events no abnormal T-cell population identified.    At interview on March 14, 2024 the patient narrated entire history.  Denied history of weight loss, fevers, night sweats, chest pain, shortness of breath, nausea, vomiting, hematemesis, melena, hematochezia and hematuria.    The patient had called for follow-up on July 11, 2024 regarding history of abdominal pain and abnormal CT scans the patient complains of frontal headaches.  Was evaluated by Dr. Hebert and noted to have low iron saturation of 10%.  She was placed on ferrous sulfate 325 mg p.o. daily.  Headaches persist.  Also, patient had retroperitoneal lymphadenopathy and biopsies were attempted twice but inconclusive.  A PET scan was ordered.  Patient anxious to know results of the tests performed.    The patient had called for follow-up on August 23, 2024 regarding history of abdominal pain and abnormal CT scans the patient complains of frontal headaches.  Was evaluated by Dr. Hebert and noted to have low iron saturation of 10%.  She was placed on ferrous sulfate 325 mg p.o. daily.  Headaches persist.  Also, patient had retroperitoneal lymphadenopathy and biopsies were attempted twice but inconclusive.  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  The patient had called for follow-up on September 13 2024 regarding history of abdominal pain and abnormal CT scans the patient complains of frontal headaches.  Was evaluated by Dr. Hebert and noted to have low iron saturation of 10%.  She was placed on ferrous sulfate 325 mg p.o. daily.  Headaches persist.  Also, patient had retroperitoneal lymphadenopathy and biopsies were attempted twice but inconclusive.  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  The patient claims that she can carry on these work but feels fatigued at the end of the day and gives herself a headache.  In addition the patient also has abdominal pain.  Ophthalmology evaluation at [facility] did not reveal any abnormality related to elevated protein count/elevated viscosity.  Past medical history:    As above.    Past surgical history:  History of uterine ablation on March 2, 2020    Mammogram:    2 years ago.    Colonoscopy:    4 years ago.        Review of Systems   All other systems reviewed and are negative.       Objective   BSA: 1.51 meters squared  /75   Pulse 66   Temp 36.1 °C (97 °F)   Resp 12   Wt 51.1 kg (112 lb 8.7 oz)   SpO2 97%   BMI 19.94 kg/m²      has no past medical history on file.   has a past surgical history that includes Other surgical history (03/02/2020).  Family History   Problem Relation Name Age of Onset    Breast cancer Other Grandparent      Oncology History   Marginal zone B-cell lymphoma (Multi)   9/5/2024 Initial Diagnosis    Marginal zone B-cell lymphoma (Multi)     9/20/2024 -  Chemotherapy    RiTUXimab (Weekly), 28 Day Cycle          Cecelia Birmingham  reports that she has never smoked. She has never been exposed to tobacco smoke. She has never used smokeless  tobacco.  She  reports current alcohol use of about 2.0 standard drinks of alcohol per week.  She  reports no history of drug use.    Physical Exam  Constitutional:       Appearance: Normal appearance.   HENT:      Head: Normocephalic and atraumatic.      Nose: Nose normal.      Mouth/Throat:      Mouth: Mucous membranes are moist.      Pharynx: Oropharynx is clear.   Eyes:      Extraocular Movements: Extraocular movements intact.      Conjunctiva/sclera: Conjunctivae normal.      Pupils: Pupils are equal, round, and reactive to light.   Cardiovascular:      Rate and Rhythm: Normal rate and regular rhythm.   Pulmonary:      Effort: Pulmonary effort is normal.      Breath sounds: Normal breath sounds.   Abdominal:      General: Abdomen is flat. Bowel sounds are normal.      Palpations: Abdomen is soft.   Musculoskeletal:         General: Normal range of motion.      Cervical back: Normal range of motion and neck supple.   Neurological:      General: No focal deficit present.      Mental Status: She is alert and oriented to person, place, and time. Mental status is at baseline.   Psychiatric:         Mood and Affect: Mood normal.         Behavior: Behavior normal.         Thought Content: Thought content normal.         Judgment: Judgment normal.         WBC   Date/Time Value Ref Range Status   08/23/2024 11:41 AM 6.7 4.4 - 11.3 x10*3/uL Final   06/27/2024 08:23 AM 5.6 4.4 - 11.3 x10*3/uL Final   05/24/2024 10:23 AM 5.3 4.4 - 11.3 x10*3/uL Final     nRBC   Date Value Ref Range Status   08/23/2024   Final     Comment:     Not Measured   06/27/2024 0.0 0.0 - 0.0 /100 WBCs Final   05/24/2024 0.0 0.0 - 0.0 /100 WBCs Final     RBC   Date Value Ref Range Status   08/23/2024 4.27 4.00 - 5.20 x10*6/uL Final   06/27/2024 4.02 4.00 - 5.20 x10*6/uL Final   05/24/2024 3.84 (L) 4.00 - 5.20 x10*6/uL Final     Hemoglobin   Date Value Ref Range Status   08/23/2024 12.3 12.0 - 16.0 g/dL Final   06/27/2024 11.3 (L) 12.0 - 16.0 g/dL  "Final   05/24/2024 11.1 (L) 12.0 - 16.0 g/dL Final     Hematocrit   Date Value Ref Range Status   08/23/2024 37.2 36.0 - 46.0 % Final   06/27/2024 35.3 (L) 36.0 - 46.0 % Final   05/24/2024 35.1 (L) 36.0 - 46.0 % Final     MCV   Date/Time Value Ref Range Status   08/23/2024 11:41 AM 87 80 - 100 fL Final   06/27/2024 08:23 AM 88 80 - 100 fL Final   05/24/2024 10:23 AM 91 80 - 100 fL Final     MCH   Date/Time Value Ref Range Status   08/23/2024 11:41 AM 28.8 26.0 - 34.0 pg Final   06/27/2024 08:23 AM 28.1 26.0 - 34.0 pg Final   05/24/2024 10:23 AM 28.9 26.0 - 34.0 pg Final     MCHC   Date/Time Value Ref Range Status   08/23/2024 11:41 AM 33.1 32.0 - 36.0 g/dL Final   06/27/2024 08:23 AM 32.0 32.0 - 36.0 g/dL Final   05/24/2024 10:23 AM 31.6 (L) 32.0 - 36.0 g/dL Final     RDW   Date/Time Value Ref Range Status   08/23/2024 11:41 AM 14.1 11.5 - 14.5 % Final   06/27/2024 08:23 AM 13.9 11.5 - 14.5 % Final   05/24/2024 10:23 AM 14.7 (H) 11.5 - 14.5 % Final     Platelets   Date/Time Value Ref Range Status   08/23/2024 11:41  150 - 450 x10*3/uL Final   06/27/2024 08:23  150 - 450 x10*3/uL Final   05/24/2024 10:23  150 - 450 x10*3/uL Final     No results found for: \"MPV\"  Neutrophils %   Date/Time Value Ref Range Status   08/23/2024 11:41 AM 77.2 40.0 - 80.0 % Final   06/27/2024 08:23 AM 65.0 40.0 - 80.0 % Final     Immature Granulocytes %, Automated   Date/Time Value Ref Range Status   08/23/2024 11:41 AM 0.1 0.0 - 0.9 % Final     Comment:     Immature Granulocyte Count (IG) includes promyelocytes, myelocytes and metamyelocytes but does not include bands. Percent differential counts (%) should be interpreted in the context of the absolute cell counts (cells/UL).   06/27/2024 08:23 AM 0.2 0.0 - 0.9 % Final     Comment:     Immature Granulocyte Count (IG) includes promyelocytes, myelocytes and metamyelocytes but does not include bands. Percent differential counts (%) should be interpreted in the context of the " "absolute cell counts (cells/UL).     Lymphocytes %   Date/Time Value Ref Range Status   08/23/2024 11:41 AM 11.3 13.0 - 44.0 % Final   06/27/2024 08:23 AM 17.1 13.0 - 44.0 % Final     Monocytes %   Date/Time Value Ref Range Status   08/23/2024 11:41 AM 10.7 2.0 - 10.0 % Final   06/27/2024 08:23 AM 15.6 2.0 - 10.0 % Final     Eosinophils %   Date/Time Value Ref Range Status   08/23/2024 11:41 AM 0.3 0.0 - 6.0 % Final   06/27/2024 08:23 AM 1.4 0.0 - 6.0 % Final     Basophils %   Date/Time Value Ref Range Status   08/23/2024 11:41 AM 0.4 0.0 - 2.0 % Final   06/27/2024 08:23 AM 0.7 0.0 - 2.0 % Final     Neutrophils Absolute   Date/Time Value Ref Range Status   08/23/2024 11:41 AM 5.17 1.20 - 7.70 x10*3/uL Final     Comment:     Percent differential counts (%) should be interpreted in the context of the absolute cell counts (cells/uL).   06/27/2024 08:23 AM 3.62 1.20 - 7.70 x10*3/uL Final     Comment:     Percent differential counts (%) should be interpreted in the context of the absolute cell counts (cells/uL).     Immature Granulocytes Absolute, Automated   Date/Time Value Ref Range Status   08/23/2024 11:41 AM 0.01 0.00 - 0.70 x10*3/uL Final   06/27/2024 08:23 AM 0.01 0.00 - 0.70 x10*3/uL Final     Lymphocytes Absolute   Date/Time Value Ref Range Status   08/23/2024 11:41 AM 0.76 (L) 1.20 - 4.80 x10*3/uL Final   06/27/2024 08:23 AM 0.95 (L) 1.20 - 4.80 x10*3/uL Final     Monocytes Absolute   Date/Time Value Ref Range Status   08/23/2024 11:41 AM 0.72 0.10 - 1.00 x10*3/uL Final   06/27/2024 08:23 AM 0.87 0.10 - 1.00 x10*3/uL Final     Eosinophils Absolute   Date/Time Value Ref Range Status   08/23/2024 11:41 AM 0.02 0.00 - 0.70 x10*3/uL Final   06/27/2024 08:23 AM 0.08 0.00 - 0.70 x10*3/uL Final     Basophils Absolute   Date/Time Value Ref Range Status   08/23/2024 11:41 AM 0.03 0.00 - 0.10 x10*3/uL Final   06/27/2024 08:23 AM 0.04 0.00 - 0.10 x10*3/uL Final       No components found for: \"PT\"  No results found for: " "\"APTT\"    Assessment/Plan      The patient is a 63-year-old woman who presented in October 2023 with abdominal pain.  She was evaluated at [facility] ED in [place] and the CT scan revealed possible retroperitoneal lymphadenopathy initial biopsy was inconclusive repeat biopsy with flow cytometry revealed CD5 negative, CD10 negative kappa restricted lymphocytes, 4% of events.  Would like to obtain more information and if necessary consider bone marrow aspiration and biopsy.  The patient is agreeable for follow-up and return in 4 weeks.    The patient had called for follow-up on March 21, 2024 regarding history of abdominal pain and abnormal CT scan with retroperitoneal lymphadenopathy/thickening.  Physical examination was within normal limits.  I have personally discussed with Dr. Caicedo.  Initially a CT-guided biopsy was performed the specimen was inadequate.  Second biopsy flow cytometry was performed but once again it was inconclusive.  I explained to the patient above findings.  Options include    Repeat CT-guided biopsy again.    Do nothing.    Repeat blood work and CT scan of chest abdomen pelvis in 4 months and consider a biopsy if abnormalities are found.  The patient has requested follow-up in 4 months and CT scan and blood work.  The patient was appreciative of the details provided and grateful.  The patient had called for follow-up on July 11, 2024 regarding history of abdominal pain and abnormal CT scans the patient complains of frontal headaches.  Was evaluated by Dr. Hebert and noted to have low iron saturation of 10%.  She was placed on ferrous sulfate 325 mg p.o. daily.  Headaches persist.  Also, patient had retroperitoneal lymphadenopathy and biopsies were attempted twice but inconclusive.  A PET scan was ordered.  Patient anxious to know results of the tests performed.  IgM kappa M protein 2.2 g/dL.  Very suspicious for low-grade lymphoma.  Recall that CT scan of abdomen pelvis had revealed retroperitoneal " lymphadenopathy.  CT-guided biopsies attempted twice were inconclusive.  PET scan shows mild FDG avidity in bilateral axilla lymph nodes as well as retroperitoneal lymph nodes but no discrete mass.  If agreeable I have recommended a bone marrow aspiration and biopsy.  The patient had called for follow-up on August 23, 2024 regarding history of abdominal pain and abnormal CT scans the patient complains of frontal headaches.  Was evaluated by Dr. Hebert and noted to have low iron saturation of 10%.  She was placed on ferrous sulfate 325 mg p.o. daily.  Headaches persist.  Also, patient had retroperitoneal lymphadenopathy and biopsies were attempted twice but inconclusive.  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  I have recommended serum viscosity, ophthalmology evaluation for retinal exam.  The patient is symptomatic and requesting assistance.  Consider single agent Rituxan SQ every 4 weeks after completion of evaluation.  Patient to return in 4 weeks.    Anemia:    Previous hemoglobin 11.1 g/dL recent hemoglobin in June 2024 had improved up to 11.3 g/dL.  Iron saturation decreased at 10%.  The patient was placed on ferrous sulfate 325 mg p.o. daily by .  I had a detailed discussion with the patient and explained to her that her symptoms do not necessarily correlate with her hemoglobin levels or low iron saturation.  However, the patient is convinced that her headache is due to low iron level.  However, I have given her a benefit of doubt and recommended increasing ferrous sulfate 325 mg p.o. half an hour after food daily, slowly increase to 3 times a day reassess in 6 weeks.  I also explained to the patient that it generally takes 4 to 6 weeks for hemoglobin to normalize  after initiating iron therapy and 3 months to replenish iron stores.  If the patient does correct with iron replacement therapy it would suggest iron deficiency and would recommend GI evaluation.  The patient is agreeable.  The patient had called for follow-up on August 23, 2024 regarding history of abdominal pain and abnormal CT scans the patient complains of frontal headaches.  Was evaluated by Dr. Hebert and noted to have low iron saturation of 10%.  She was placed on ferrous sulfate 325 mg p.o. daily.  Headaches persist.  Also, patient had retroperitoneal lymphadenopathy and biopsies were attempted twice but inconclusive.  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  The patient has various symptoms such as abdominal pain, easy fatigability.  Although, she is able to carry on daily activities but feels very fatigued and gives herself a headache.  She has had headaches for many years.  However, the other symptoms I have given her a benefit of doubt and recommended single agent Rituxan given weekly.  Printed material from [facility] provided.  Informed consent obtained.  Patient to initiate cycle 1 Rituxan on September 20, 2024.  Return in 2 weeks.    Thank you for allowing me to participate in care of your patient if you have any questions please feel free to call me.  Diagnoses and all orders for this visit:  Retroperitoneal lymphadenopathy  -     Referral to Hematology and Oncology         Jefferson Morales MD

## 2024-09-13 NOTE — TELEPHONE ENCOUNTER
Patient called in requesting to speak with you. She has some questions about things discussed today. I advised her that you were in clinic and I would give you the message to call her back. She understood and agreed.

## 2024-09-13 NOTE — PROGRESS NOTES
"Patient left disease binder at desk by scheduling  Called patient and she will get next Friday with treatment.  She is \"to far away\" to come back and pickup now.  Asked if she would like me to send her the info on chemo class and she declines at this time.    "

## 2024-09-13 NOTE — PROGRESS NOTES
Patient for first dose IV rituxan next week followed by 3 weeks of subcutaneous rituxan.  Reviewed rituxan lexicomp sheet with  patient.  Info on on line chemo class given and encouraged.  Consent signed.  Rituxan Labs drawn.  Follow up in 2 weeks with tx.  Patient for MRI brain WWO contrast.  All questions answered.  New chemo patient packet given and reviewed.  Patient independently ambulatory off unit in NAD and without complaints.  Gait steady.  Call back instructions reviewed.  Patient verbalized understanding.

## 2024-09-14 LAB — B2 MICROGLOB SERPL-MCNC: 3.6 MG/L (ref 0.7–2.2)

## 2024-09-15 LAB
KAPPA LC SERPL-MCNC: 3.26 MG/DL (ref 0.33–1.94)
KAPPA LC/LAMBDA SER: 9.06 {RATIO} (ref 0.26–1.65)
LAMBDA LC SERPL-MCNC: 0.36 MG/DL (ref 0.57–2.63)

## 2024-09-19 LAB
ALBUMIN: 4 G/DL (ref 3.4–5)
ALPHA 1 GLOBULIN: 0.4 G/DL (ref 0.2–0.6)
ALPHA 2 GLOBULIN: 0.6 G/DL (ref 0.4–1.1)
BETA GLOBULIN: 2.5 G/DL (ref 0.5–1.2)
GAMMA GLOBULIN: 0.3 G/DL (ref 0.5–1.4)
IMMUNOFIXATION COMMENT: ABNORMAL
M-PROTEIN 1: 1.5 G/DL
PATH REVIEW - SERUM IMMUNOFIXATION: ABNORMAL
PATH REVIEW-SERUM PROTEIN ELECTROPHORESIS: ABNORMAL
PROTEIN ELECTROPHORESIS COMMENT: ABNORMAL

## 2024-09-20 ENCOUNTER — APPOINTMENT (OUTPATIENT)
Dept: HEMATOLOGY/ONCOLOGY | Facility: CLINIC | Age: 64
End: 2024-09-20
Payer: COMMERCIAL

## 2024-09-20 ENCOUNTER — INFUSION (OUTPATIENT)
Dept: HEMATOLOGY/ONCOLOGY | Facility: CLINIC | Age: 64
End: 2024-09-20
Payer: COMMERCIAL

## 2024-09-20 VITALS
OXYGEN SATURATION: 99 % | HEIGHT: 62 IN | DIASTOLIC BLOOD PRESSURE: 46 MMHG | SYSTOLIC BLOOD PRESSURE: 80 MMHG | RESPIRATION RATE: 16 BRPM | BODY MASS INDEX: 21.2 KG/M2 | HEART RATE: 71 BPM | TEMPERATURE: 97 F | WEIGHT: 115.19 LBS

## 2024-09-20 DIAGNOSIS — R93.5 ABNORMAL CT OF THE ABDOMEN: ICD-10-CM

## 2024-09-20 DIAGNOSIS — C85.80 MARGINAL ZONE B-CELL LYMPHOMA (MULTI): ICD-10-CM

## 2024-09-20 PROCEDURE — 96415 CHEMO IV INFUSION ADDL HR: CPT

## 2024-09-20 PROCEDURE — 2500000001 HC RX 250 WO HCPCS SELF ADMINISTERED DRUGS (ALT 637 FOR MEDICARE OP): Performed by: INTERNAL MEDICINE

## 2024-09-20 PROCEDURE — 96413 CHEMO IV INFUSION 1 HR: CPT

## 2024-09-20 PROCEDURE — 2500000004 HC RX 250 GENERAL PHARMACY W/ HCPCS (ALT 636 FOR OP/ED): Mod: JZ | Performed by: INTERNAL MEDICINE

## 2024-09-20 RX ORDER — FAMOTIDINE 10 MG/ML
20 INJECTION INTRAVENOUS ONCE AS NEEDED
Status: DISCONTINUED | OUTPATIENT
Start: 2024-09-20 | End: 2024-09-20 | Stop reason: HOSPADM

## 2024-09-20 RX ORDER — DIPHENHYDRAMINE HCL 50 MG
50 CAPSULE ORAL ONCE
Status: COMPLETED | OUTPATIENT
Start: 2024-09-20 | End: 2024-09-20

## 2024-09-20 RX ORDER — PROCHLORPERAZINE EDISYLATE 5 MG/ML
10 INJECTION INTRAMUSCULAR; INTRAVENOUS EVERY 6 HOURS PRN
Status: DISCONTINUED | OUTPATIENT
Start: 2024-09-20 | End: 2024-09-20 | Stop reason: HOSPADM

## 2024-09-20 RX ORDER — HEPARIN 100 UNIT/ML
500 SYRINGE INTRAVENOUS AS NEEDED
OUTPATIENT
Start: 2024-09-20

## 2024-09-20 RX ORDER — EPINEPHRINE 0.3 MG/.3ML
0.3 INJECTION SUBCUTANEOUS EVERY 5 MIN PRN
Status: DISCONTINUED | OUTPATIENT
Start: 2024-09-20 | End: 2024-09-20 | Stop reason: HOSPADM

## 2024-09-20 RX ORDER — DIPHENHYDRAMINE HYDROCHLORIDE 50 MG/ML
50 INJECTION INTRAMUSCULAR; INTRAVENOUS AS NEEDED
Status: DISCONTINUED | OUTPATIENT
Start: 2024-09-20 | End: 2024-09-20 | Stop reason: HOSPADM

## 2024-09-20 RX ORDER — PROCHLORPERAZINE MALEATE 10 MG
10 TABLET ORAL EVERY 6 HOURS PRN
Status: DISCONTINUED | OUTPATIENT
Start: 2024-09-20 | End: 2024-09-20 | Stop reason: HOSPADM

## 2024-09-20 RX ORDER — HEPARIN SODIUM,PORCINE/PF 10 UNIT/ML
50 SYRINGE (ML) INTRAVENOUS AS NEEDED
OUTPATIENT
Start: 2024-09-20

## 2024-09-20 RX ORDER — ACETAMINOPHEN 325 MG/1
650 TABLET ORAL ONCE
Status: COMPLETED | OUTPATIENT
Start: 2024-09-20 | End: 2024-09-20

## 2024-09-20 RX ORDER — ALBUTEROL SULFATE 0.83 MG/ML
3 SOLUTION RESPIRATORY (INHALATION) AS NEEDED
Status: DISCONTINUED | OUTPATIENT
Start: 2024-09-20 | End: 2024-09-20 | Stop reason: HOSPADM

## 2024-09-20 ASSESSMENT — PAIN SCALES - GENERAL: PAINLEVEL: 0-NO PAIN

## 2024-09-20 NOTE — PROGRESS NOTES
Adverse Event Note     Name:Cecelia Birmingham  : 1960  MRN: [id number]      Adverse Event:  Medication: Rituxan  Administered Date/Time:   Reactions/Symptoms Started Time: 115   Symptoms: (check all that apply)   [] Back Pain   [] Erythema Face     [] Hypotension     [] Rash/Rigors [x] Other   [] Bleeding    [] Erythema Hands  [] Itching  [] Swelling/Edema [] Unknown   [] Chest Pain [] Hives/Urticaria     [] Low platelet Ct  [] Syncope   [] Cytopenia  [] Hypertension       [] Neutropenia    Severity: Mild   Provider Notified: Yes   Medications Given(Add all medications to the chart via , or treatment plan)   [] Acetaminophen-Tylenol       [] Famotidine-Pepcid  [] Nitroglycerine-NTG   [] Albuterol                               [] Hydrocortisone       [] Ondansetron-Zofran   [] Diphenhydramine-Benadryl  [] Lorazepam-Ativan  [] Naloxone-Narcan   [] Epinephrine-Epi                     [] Methylprednisone   Additional Details/ Comments:     Pt complains of itchy tongue. Medication stopped and IVF initiated  Per Dr. Morales, continue with rituxan at ordered rates. No rescue meds administered at this time.   1154: rituxan restarted at rate 143 ml/hr

## 2024-09-20 NOTE — PROGRESS NOTES
Patient is here today for first dose Rituximab infusion - no complications since last being seen-  Independent double check done prior to infusion today-   b/h/ lung sounds not auscultated.   Pt complains of itchy tongue. Medication stopped and IVF initiated  Per Dr. Morales, continue with rituxan at ordered rates. No rescue meds administered at this time.   1154: rituxan restarted at rate 143 ml/hr         Asymptomatic at end of treatment.  Encouraged po intake.  Call back instructions reviewed.    Patient verbalizes understanding of plan of care.  Ambulated off unit without difficulty, steady gait.

## 2024-09-23 ENCOUNTER — TELEPHONE (OUTPATIENT)
Dept: HEMATOLOGY/ONCOLOGY | Facility: CLINIC | Age: 64
End: 2024-09-23
Payer: COMMERCIAL

## 2024-09-23 ENCOUNTER — PATIENT OUTREACH (OUTPATIENT)
Dept: HEMATOLOGY/ONCOLOGY | Facility: CLINIC | Age: 64
End: 2024-09-23
Payer: COMMERCIAL

## 2024-09-23 NOTE — TELEPHONE ENCOUNTER
Dr. Morales patient. Patient called wants to talk to nurse Snow regarding her upcoming appointment because she has questions for the nurse. Phone # [phone]

## 2024-09-23 NOTE — PROGRESS NOTES
Call placed to patient as she received first dose Rituxan on 9/20/24.  Pt reports she did well with the infusion with no infusion related sensitivity reaction noted.  States next day she did have puffiness around both eyes but denies any redness or itching around the eyes.  Eye puffiness resolved next day.      Noted patient's blood pressure was low during the Rituxan infusion.  Encouraged fluid hydration especially around the Rituxan infusions and to try to take in 64 oz non caffeine fluids.  Patient verbalizes understanding of plan of care via teachback method.     Pt aware of next Rituxan weekly appts will be much shorter as she will be receiving Hylecta.  Pt will still bring a  as the Benadryl/Tylenol premeds made her very tired and does not want to risk driving home herself.  Reinforced next FUV with Dr. Morales this week on 9/27/24.  Pt denies further questions or needs.  Patient verbalizes understanding of plan of care via teachback method.

## 2024-09-27 ENCOUNTER — OFFICE VISIT (OUTPATIENT)
Dept: HEMATOLOGY/ONCOLOGY | Facility: CLINIC | Age: 64
End: 2024-09-27
Payer: COMMERCIAL

## 2024-09-27 ENCOUNTER — EDUCATION (OUTPATIENT)
Dept: HEMATOLOGY/ONCOLOGY | Facility: CLINIC | Age: 64
End: 2024-09-27

## 2024-09-27 ENCOUNTER — INFUSION (OUTPATIENT)
Dept: HEMATOLOGY/ONCOLOGY | Facility: CLINIC | Age: 64
End: 2024-09-27
Payer: COMMERCIAL

## 2024-09-27 VITALS
SYSTOLIC BLOOD PRESSURE: 107 MMHG | RESPIRATION RATE: 12 BRPM | DIASTOLIC BLOOD PRESSURE: 66 MMHG | OXYGEN SATURATION: 99 % | TEMPERATURE: 97.9 F | WEIGHT: 113.76 LBS | BODY MASS INDEX: 20.59 KG/M2 | HEART RATE: 73 BPM

## 2024-09-27 DIAGNOSIS — R93.5 ABNORMAL CT OF THE ABDOMEN: ICD-10-CM

## 2024-09-27 DIAGNOSIS — C85.80 MARGINAL ZONE B-CELL LYMPHOMA (MULTI): ICD-10-CM

## 2024-09-27 DIAGNOSIS — R59.0 RETROPERITONEAL LYMPHADENOPATHY: ICD-10-CM

## 2024-09-27 PROCEDURE — 96401 CHEMO ANTI-NEOPL SQ/IM: CPT

## 2024-09-27 PROCEDURE — 2500000001 HC RX 250 WO HCPCS SELF ADMINISTERED DRUGS (ALT 637 FOR MEDICARE OP): Performed by: INTERNAL MEDICINE

## 2024-09-27 PROCEDURE — 99215 OFFICE O/P EST HI 40 MIN: CPT | Performed by: INTERNAL MEDICINE

## 2024-09-27 PROCEDURE — 2500000004 HC RX 250 GENERAL PHARMACY W/ HCPCS (ALT 636 FOR OP/ED): Mod: JZ | Performed by: INTERNAL MEDICINE

## 2024-09-27 RX ORDER — PROCHLORPERAZINE MALEATE 10 MG
10 TABLET ORAL EVERY 6 HOURS PRN
Status: DISCONTINUED | OUTPATIENT
Start: 2024-09-27 | End: 2024-09-27 | Stop reason: HOSPADM

## 2024-09-27 RX ORDER — DIPHENHYDRAMINE HCL 50 MG
50 CAPSULE ORAL ONCE
Status: COMPLETED | OUTPATIENT
Start: 2024-09-27 | End: 2024-09-27

## 2024-09-27 RX ORDER — ACETAMINOPHEN 325 MG/1
650 TABLET ORAL ONCE
Status: COMPLETED | OUTPATIENT
Start: 2024-09-27 | End: 2024-09-27

## 2024-09-27 RX ORDER — PROCHLORPERAZINE EDISYLATE 5 MG/ML
10 INJECTION INTRAMUSCULAR; INTRAVENOUS EVERY 6 HOURS PRN
Status: DISCONTINUED | OUTPATIENT
Start: 2024-09-27 | End: 2024-09-27 | Stop reason: HOSPADM

## 2024-09-27 ASSESSMENT — PAIN SCALES - GENERAL: PAINLEVEL: 0-NO PAIN

## 2024-09-27 NOTE — PROGRESS NOTES
"Patient seen by Dr. Juan today in clinic. Reinforcement education provided regarding next steps with plan of care.      PER DR. JUAN'S FUV NOTE TODAY: \"The patient had come  for follow-up on September 27, 2024 regarding history of abdominal pain and abnormal CT scans the patient complains of frontal headaches.  Was evaluated by Dr. Hebert and noted to have low iron saturation of 10%.  She was placed on ferrous sulfate 325 mg p.o. daily.  Headaches persist.  Also, patient had retroperitoneal lymphadenopathy and biopsies were attempted twice but inconclusive.  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  The patient claims that she can carry on these work but feels fatigued at the end of the day and gives herself a headache.  In addition the patient also has abdominal pain.  Ophthalmology evaluation at [facility] did not reveal any abnormality related to elevated protein count/elevated viscosity.  Because of progressive and persistent symptoms the patient was started on weekly Rituxan beginning September 20, 2024.  After 1 treatment the patient claims that she is beginning to feel better.  Previously noted abdominal pain and headaches are less.  M protein down to 1.5 g/dL.  Cycle 2 of weekly Rituxan today.  See me in 2 weeks. \"    Prior auth obtained today for Rituxan subcutaneous Hycela for weeks #2-4.  FUV with Dr. Juan on last weekly Rituxan treatment 10/11/24. Pt reporting her headache has slightly improved with first dose Rituxan.  Patient verbalizes understanding of plan of care via teachback method.                 "

## 2024-09-27 NOTE — PROGRESS NOTES
Patient is here today for Ritux injection- no complications since last being seen-  Independent double check done prior to injection today-   b/h/ lung sounds not auscultated  Patient tolerated injection well.  No complaints. Call back instructions reviewed.    Patient verbalizes understanding of plan of care.  Ambulated off unit without difficulty, steady gait.

## 2024-09-27 NOTE — PROGRESS NOTES
Patient ID: Cecelia Birmingham is a 64 y.o. female.  Referring Physician: Jefferson Morales MD  [address] [facility], [address]  [place] [id number]  Primary Care Provider: Heriberto Hebert MD  Visit Type: Initial Visit  Bone marrow aspiration and biopsy on August 2, 2024 revealed hypercellular bone marrow with involvement by CD5, CD10 negative lymphoproliferative disorder.    Plasma cell population with cytoplasmic kappa excess 5% present.    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    IgM kappa 2.2 g/dL, beta-2 microglobulin 3.7 mcg/dL.  Chromosomal analysis revealed 46XX, +4, +6, add(6)(q12)x2,-8[2]/46XX del(6)(q12q25)[2]/46XX[17]  Retroperitoneal lymphadenopathy.  Started single agent weekly Rituxan on 9/20/24  Subjective    HPI  The patient was referred to me for further evaluation and management of retroperitoneal lymphadenopathy, evaluated on March 14, 2024.  The patient was doing well till October 25, 2023 when she noticed acute onset of sharp mid abdominal pain shortly after awakening from sleep.  It persisted through the day with associated bloating.  The patient worked the whole day but eventually presented to ED in [facility].  Did not have any fevers chills sweats nausea vomiting diarrhea constipation hematemesis melena hematochezia materia.  The patient was discharged on antibiotics after CT scans.  The patient claims that she is felt better bloating is resolved she does have minimal residual abdominal discomfort.  CT scan revealed aortocaval and left periaortic ill-defined soft tissue extending below the aortic bifurcation.  Differential diagnosis thought to be retroperitoneal fibrosis, idiopathic, not aneurysmal form of colonic aortitis or lymphoma.  There was no associated hydronephrosis.  A 2.8 cm mixed hyperattenuating and hypoattenuating mass was noted in the left lobe of the liver.  A tiny 7 mm lesion was noted in the lower pole of the  right kidney.  A follow-up MRI at  revealed simple cyst in the right kidney 5 mm at the lower pole.  Hemangioma noted in the liver corresponding to the CT finding.  The retroperitoneal soft tissue fullness is confirmed and not significantly different than the CT imaging.  The patient has never had abdominal operation.CT-guided biopsy on January 12, 2024 revealed limited tissue with an atypical lymphoid infiltrate.  Flow cytometry on February 6, 2024 revealed CD5 negative, CD10 negative, kappa restricted B-cell population 4% of total events no abnormal T-cell population identified.    At interview on March 14, 2024 the patient narrated entire history.  Denied history of weight loss, fevers, night sweats, chest pain, shortness of breath, nausea, vomiting, hematemesis, melena, hematochezia and hematuria.    The patient had called for follow-up on July 11, 2024 regarding history of abdominal pain and abnormal CT scans the patient complains of frontal headaches.  Was evaluated by Dr. Hebert and noted to have low iron saturation of 10%.  She was placed on ferrous sulfate 325 mg p.o. daily.  Headaches persist.  Also, patient had retroperitoneal lymphadenopathy and biopsies were attempted twice but inconclusive.  A PET scan was ordered.  Patient anxious to know results of the tests performed.    The patient had called for follow-up on August 23, 2024 regarding history of abdominal pain and abnormal CT scans the patient complains of frontal headaches.  Was evaluated by Dr. Hebert and noted to have low iron saturation of 10%.  She was placed on ferrous sulfate 325 mg p.o. daily.  Headaches persist.  Also, patient had retroperitoneal lymphadenopathy and biopsies were attempted twice but inconclusive.  A bone marrow aspiration and biopsy on August 2, 2024 revealed findings consistent with 5% plasma cells kappa restriction, suggestive of MGUS, hypercellular bone marrow with involvement of CD5, CD10 negative lymphoplasmacytic  lymphoma with MYD 88 mutation and IgH mutation as well.  The patient had called for follow-up on September 13 2024 regarding history of abdominal pain and abnormal CT scans the patient complains of frontal headaches.  Was evaluated by Dr. Hebert and noted to have low iron saturation of 10%.  She was placed on ferrous sulfate 325 mg p.o. daily.  Headaches persist.  Also, patient had retroperitoneal lymphadenopathy and biopsies were attempted twice but inconclusive.  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  The patient claims that she can carry on these work but feels fatigued at the end of the day and gives herself a headache.  In addition the patient also has abdominal pain.  Ophthalmology evaluation at [facility] did not reveal any abnormality related to elevated protein count/elevated viscosity.    The patient had come  for follow-up on September 27, 2024 regarding history of abdominal pain and abnormal CT scans the patient complains of frontal headaches.  Was evaluated by Dr. Hebert and noted to have low iron saturation of 10%.  She was placed on ferrous sulfate 325 mg p.o. daily.  Headaches persist.  Also, patient had retroperitoneal lymphadenopathy and biopsies were attempted twice but inconclusive.  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  The patient claims that she can carry on these work but feels fatigued at the end of the day and gives herself a headache.  In addition the patient also has abdominal pain.  Ophthalmology evaluation at [facility]  did not reveal any abnormality related to elevated protein count/elevated viscosity.  Because of progressive and persistent symptoms the patient was started on weekly Rituxan beginning September 20, 2024.  After 1 treatment the patient claims that she is beginning to feel better.  Previously noted abdominal pain and headaches are less.  M protein down to 1.5 g/dL.    Past medical history:    As above.    Past surgical history:  History of uterine ablation on March 2, 2020    Mammogram:    2 years ago.    Colonoscopy:    4 years ago.        Review of Systems   All other systems reviewed and are negative.       Objective   BSA: 1.51 meters squared  /66   Pulse 73   Temp 36.6 °C (97.9 °F)   Resp 12   Wt 51.6 kg (113 lb 12.1 oz)   SpO2 99%   BMI 20.59 kg/m²      has no past medical history on file.   has a past surgical history that includes Other surgical history (03/02/2020).  Family History   Problem Relation Name Age of Onset    Breast cancer Other Grandparent      Oncology History   Marginal zone B-cell lymphoma (Multi)   9/5/2024 Initial Diagnosis    Marginal zone B-cell lymphoma (Multi)     9/20/2024 -  Chemotherapy    RiTUXimab (Weekly), 28 Day Cycle          Cecelia Birmingham  reports that she has never smoked. She has never been exposed to tobacco smoke. She has never used smokeless tobacco.  She  reports current alcohol use of about 2.0 standard drinks of alcohol per week.  She  reports no history of drug use.    Physical Exam  Constitutional:       Appearance: Normal appearance.   HENT:      Head: Normocephalic and atraumatic.      Nose: Nose normal.      Mouth/Throat:      Mouth: Mucous membranes are moist.      Pharynx: Oropharynx is clear.   Eyes:      Extraocular Movements: Extraocular movements intact.      Conjunctiva/sclera: Conjunctivae normal.      Pupils: Pupils are equal, round, and reactive to light.   Cardiovascular:      Rate and Rhythm: Normal rate and regular rhythm.   Pulmonary:       Effort: Pulmonary effort is normal.      Breath sounds: Normal breath sounds.   Abdominal:      General: Abdomen is flat. Bowel sounds are normal.      Palpations: Abdomen is soft.   Musculoskeletal:         General: Normal range of motion.      Cervical back: Normal range of motion and neck supple.   Neurological:      General: No focal deficit present.      Mental Status: She is alert and oriented to person, place, and time. Mental status is at baseline.   Psychiatric:         Mood and Affect: Mood normal.         Behavior: Behavior normal.         Thought Content: Thought content normal.         Judgment: Judgment normal.       WBC   Date/Time Value Ref Range Status   09/13/2024 09:58 AM 6.7 4.4 - 11.3 x10*3/uL Final   08/23/2024 11:41 AM 6.7 4.4 - 11.3 x10*3/uL Final   06/27/2024 08:23 AM 5.6 4.4 - 11.3 x10*3/uL Final     nRBC   Date Value Ref Range Status   09/13/2024   Final     Comment:     Not Measured   08/23/2024   Final     Comment:     Not Measured   06/27/2024 0.0 0.0 - 0.0 /100 WBCs Final     RBC   Date Value Ref Range Status   09/13/2024 4.31 4.00 - 5.20 x10*6/uL Final   08/23/2024 4.27 4.00 - 5.20 x10*6/uL Final   06/27/2024 4.02 4.00 - 5.20 x10*6/uL Final     Hemoglobin   Date Value Ref Range Status   09/13/2024 12.5 12.0 - 16.0 g/dL Final   08/23/2024 12.3 12.0 - 16.0 g/dL Final   06/27/2024 11.3 (L) 12.0 - 16.0 g/dL Final     Hematocrit   Date Value Ref Range Status   09/13/2024 37.7 36.0 - 46.0 % Final   08/23/2024 37.2 36.0 - 46.0 % Final   06/27/2024 35.3 (L) 36.0 - 46.0 % Final     MCV   Date/Time Value Ref Range Status   09/13/2024 09:58 AM 88 80 - 100 fL Final   08/23/2024 11:41 AM 87 80 - 100 fL Final   06/27/2024 08:23 AM 88 80 - 100 fL Final     MCH   Date/Time Value Ref Range Status   09/13/2024 09:58 AM 29.0 26.0 - 34.0 pg Final   08/23/2024 11:41 AM 28.8 26.0 - 34.0 pg Final   06/27/2024 08:23 AM 28.1 26.0 - 34.0 pg Final     MCHC   Date/Time Value Ref Range Status   09/13/2024  "09:58 AM 33.2 32.0 - 36.0 g/dL Final   08/23/2024 11:41 AM 33.1 32.0 - 36.0 g/dL Final   06/27/2024 08:23 AM 32.0 32.0 - 36.0 g/dL Final     RDW   Date/Time Value Ref Range Status   09/13/2024 09:58 AM 14.4 11.5 - 14.5 % Final   08/23/2024 11:41 AM 14.1 11.5 - 14.5 % Final   06/27/2024 08:23 AM 13.9 11.5 - 14.5 % Final     Platelets   Date/Time Value Ref Range Status   09/13/2024 09:58  150 - 450 x10*3/uL Final   08/23/2024 11:41  150 - 450 x10*3/uL Final   06/27/2024 08:23  150 - 450 x10*3/uL Final     No results found for: \"MPV\"  Neutrophils %   Date/Time Value Ref Range Status   09/13/2024 09:58 AM 71.8 40.0 - 80.0 % Final   08/23/2024 11:41 AM 77.2 40.0 - 80.0 % Final   06/27/2024 08:23 AM 65.0 40.0 - 80.0 % Final     Immature Granulocytes %, Automated   Date/Time Value Ref Range Status   09/13/2024 09:58 AM 0.1 0.0 - 0.9 % Final     Comment:     Immature Granulocyte Count (IG) includes promyelocytes, myelocytes and metamyelocytes but does not include bands. Percent differential counts (%) should be interpreted in the context of the absolute cell counts (cells/UL).   08/23/2024 11:41 AM 0.1 0.0 - 0.9 % Final     Comment:     Immature Granulocyte Count (IG) includes promyelocytes, myelocytes and metamyelocytes but does not include bands. Percent differential counts (%) should be interpreted in the context of the absolute cell counts (cells/UL).   06/27/2024 08:23 AM 0.2 0.0 - 0.9 % Final     Comment:     Immature Granulocyte Count (IG) includes promyelocytes, myelocytes and metamyelocytes but does not include bands. Percent differential counts (%) should be interpreted in the context of the absolute cell counts (cells/UL).     Lymphocytes %   Date/Time Value Ref Range Status   09/13/2024 09:58 AM 14.6 13.0 - 44.0 % Final   08/23/2024 11:41 AM 11.3 13.0 - 44.0 % Final   06/27/2024 08:23 AM 17.1 13.0 - 44.0 % Final     Monocytes %   Date/Time Value Ref Range Status   09/13/2024 09:58 AM 12.5 2.0 - " 10.0 % Final   08/23/2024 11:41 AM 10.7 2.0 - 10.0 % Final   06/27/2024 08:23 AM 15.6 2.0 - 10.0 % Final     Eosinophils %   Date/Time Value Ref Range Status   09/13/2024 09:58 AM 0.7 0.0 - 6.0 % Final   08/23/2024 11:41 AM 0.3 0.0 - 6.0 % Final   06/27/2024 08:23 AM 1.4 0.0 - 6.0 % Final     Basophils %   Date/Time Value Ref Range Status   09/13/2024 09:58 AM 0.3 0.0 - 2.0 % Final   08/23/2024 11:41 AM 0.4 0.0 - 2.0 % Final   06/27/2024 08:23 AM 0.7 0.0 - 2.0 % Final     Neutrophils Absolute   Date/Time Value Ref Range Status   09/13/2024 09:58 AM 4.80 1.20 - 7.70 x10*3/uL Final     Comment:     Percent differential counts (%) should be interpreted in the context of the absolute cell counts (cells/uL).   08/23/2024 11:41 AM 5.17 1.20 - 7.70 x10*3/uL Final     Comment:     Percent differential counts (%) should be interpreted in the context of the absolute cell counts (cells/uL).   06/27/2024 08:23 AM 3.62 1.20 - 7.70 x10*3/uL Final     Comment:     Percent differential counts (%) should be interpreted in the context of the absolute cell counts (cells/uL).     Immature Granulocytes Absolute, Automated   Date/Time Value Ref Range Status   09/13/2024 09:58 AM 0.01 0.00 - 0.70 x10*3/uL Final   08/23/2024 11:41 AM 0.01 0.00 - 0.70 x10*3/uL Final   06/27/2024 08:23 AM 0.01 0.00 - 0.70 x10*3/uL Final     Lymphocytes Absolute   Date/Time Value Ref Range Status   09/13/2024 09:58 AM 0.98 (L) 1.20 - 4.80 x10*3/uL Final   08/23/2024 11:41 AM 0.76 (L) 1.20 - 4.80 x10*3/uL Final   06/27/2024 08:23 AM 0.95 (L) 1.20 - 4.80 x10*3/uL Final     Monocytes Absolute   Date/Time Value Ref Range Status   09/13/2024 09:58 AM 0.84 0.10 - 1.00 x10*3/uL Final   08/23/2024 11:41 AM 0.72 0.10 - 1.00 x10*3/uL Final   06/27/2024 08:23 AM 0.87 0.10 - 1.00 x10*3/uL Final     Eosinophils Absolute   Date/Time Value Ref Range Status   09/13/2024 09:58 AM 0.05 0.00 - 0.70 x10*3/uL Final   08/23/2024 11:41 AM 0.02 0.00 - 0.70 x10*3/uL Final  "  06/27/2024 08:23 AM 0.08 0.00 - 0.70 x10*3/uL Final     Basophils Absolute   Date/Time Value Ref Range Status   09/13/2024 09:58 AM 0.02 0.00 - 0.10 x10*3/uL Final   08/23/2024 11:41 AM 0.03 0.00 - 0.10 x10*3/uL Final   06/27/2024 08:23 AM 0.04 0.00 - 0.10 x10*3/uL Final       No components found for: \"PT\"  No results found for: \"APTT\"    Assessment/Plan      The patient is a 63-year-old woman who presented in October 2023 with abdominal pain.  She was evaluated at [facility] ED in [place] and the CT scan revealed possible retroperitoneal lymphadenopathy initial biopsy was inconclusive repeat biopsy with flow cytometry revealed CD5 negative, CD10 negative kappa restricted lymphocytes, 4% of events.  Would like to obtain more information and if necessary consider bone marrow aspiration and biopsy.  The patient is agreeable for follow-up and return in 4 weeks.    The patient had called for follow-up on March 21, 2024 regarding history of abdominal pain and abnormal CT scan with retroperitoneal lymphadenopathy/thickening.  Physical examination was within normal limits.  I have personally discussed with Dr. Caicedo.  Initially a CT-guided biopsy was performed the specimen was inadequate.  Second biopsy flow cytometry was performed but once again it was inconclusive.  I explained to the patient above findings.  Options include    Repeat CT-guided biopsy again.    Do nothing.    Repeat blood work and CT scan of chest abdomen pelvis in 4 months and consider a biopsy if abnormalities are found.  The patient has requested follow-up in 4 months and CT scan and blood work.  The patient was appreciative of the details provided and grateful.  The patient had called for follow-up on July 11, 2024 regarding history of abdominal pain and abnormal CT scans the patient complains of frontal headaches.  Was evaluated by Dr. Hebert and noted to have low iron saturation of 10%.  She was placed on ferrous sulfate 325 mg p.o. daily.  Headaches " persist.  Also, patient had retroperitoneal lymphadenopathy and biopsies were attempted twice but inconclusive.  A PET scan was ordered.  Patient anxious to know results of the tests performed.  IgM kappa M protein 2.2 g/dL.  Very suspicious for low-grade lymphoma.  Recall that CT scan of abdomen pelvis had revealed retroperitoneal lymphadenopathy.  CT-guided biopsies attempted twice were inconclusive.  PET scan shows mild FDG avidity in bilateral axilla lymph nodes as well as retroperitoneal lymph nodes but no discrete mass.  If agreeable I have recommended a bone marrow aspiration and biopsy.  The patient had called for follow-up on August 23, 2024 regarding history of abdominal pain and abnormal CT scans the patient complains of frontal headaches.  Was evaluated by Dr. Hebert and noted to have low iron saturation of 10%.  She was placed on ferrous sulfate 325 mg p.o. daily.  Headaches persist.  Also, patient had retroperitoneal lymphadenopathy and biopsies were attempted twice but inconclusive.  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  I have recommended serum viscosity, ophthalmology evaluation for retinal exam.  The patient is symptomatic and requesting assistance.  Consider single agent Rituxan SQ every 4 weeks after completion of evaluation.  Patient to return in 4 weeks.    Anemia:    Previous hemoglobin 11.1 g/dL recent hemoglobin in June 2024 had improved up to 11.3 g/dL.  Iron saturation decreased at 10%.  The patient was placed on ferrous sulfate 325 mg p.o. daily by .  I had a detailed discussion with the patient and explained to her that her symptoms do not necessarily correlate with her hemoglobin levels or low iron saturation.  However, the patient is  convinced that her headache is due to low iron level.  However, I have given her a benefit of doubt and recommended increasing ferrous sulfate 325 mg p.o. half an hour after food daily, slowly increase to 3 times a day reassess in 6 weeks.  I also explained to the patient that it generally takes 4 to 6 weeks for hemoglobin to normalize after initiating iron therapy and 3 months to replenish iron stores.  If the patient does correct with iron replacement therapy it would suggest iron deficiency and would recommend GI evaluation.  The patient is agreeable.  The patient had called for follow-up on August 23, 2024 regarding history of abdominal pain and abnormal CT scans the patient complains of frontal headaches.  Was evaluated by Dr. Hebert and noted to have low iron saturation of 10%.  She was placed on ferrous sulfate 325 mg p.o. daily.  Headaches persist.  Also, patient had retroperitoneal lymphadenopathy and biopsies were attempted twice but inconclusive.  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  The patient has various symptoms such as abdominal pain, easy fatigability.  Although, she is able to carry on daily activities but feels very fatigued and gives herself a headache.  She has had headaches for many years.  However, the other symptoms I have given her a benefit of doubt and recommended single agent Rituxan given weekly.  Printed material from [facility] provided.  Informed consent obtained.  Patient to initiate cycle 1 Rituxan on September 20, 2024.  Return in 2 weeks.  The patient had come  for follow-up on September 27, 2024 regarding history of abdominal pain and abnormal CT scans the patient complains of frontal headaches.  Was evaluated by Dr. Hebert and noted to have low  iron saturation of 10%.  She was placed on ferrous sulfate 325 mg p.o. daily.  Headaches persist.  Also, patient had retroperitoneal lymphadenopathy and biopsies were attempted twice but inconclusive.  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  The patient claims that she can carry on these work but feels fatigued at the end of the day and gives herself a headache.  In addition the patient also has abdominal pain.  Ophthalmology evaluation at [facility] did not reveal any abnormality related to elevated protein count/elevated viscosity.  Because of progressive and persistent symptoms the patient was started on weekly Rituxan beginning September 20, 2024.  After 1 treatment the patient claims that she is beginning to feel better.  Previously noted abdominal pain and headaches are less.  M protein down to 1.5 g/dL.  Cycle 2 of weekly Rituxan today.  See me in 2 weeks.    Thank you for allowing me to participate in care of your patient if you have any questions please feel free to call me.  Diagnoses and all orders for this visit:  Retroperitoneal lymphadenopathy  -     Referral to Hematology and Oncology         Jefferson Morales MD

## 2024-10-04 ENCOUNTER — INFUSION (OUTPATIENT)
Dept: HEMATOLOGY/ONCOLOGY | Facility: CLINIC | Age: 64
End: 2024-10-04
Payer: COMMERCIAL

## 2024-10-04 ENCOUNTER — SOCIAL WORK (OUTPATIENT)
Dept: HEMATOLOGY/ONCOLOGY | Facility: CLINIC | Age: 64
End: 2024-10-04
Payer: COMMERCIAL

## 2024-10-04 VITALS
TEMPERATURE: 98.1 F | BODY MASS INDEX: 21.38 KG/M2 | HEART RATE: 73 BPM | WEIGHT: 116.18 LBS | DIASTOLIC BLOOD PRESSURE: 58 MMHG | HEIGHT: 62 IN | RESPIRATION RATE: 16 BRPM | OXYGEN SATURATION: 99 % | SYSTOLIC BLOOD PRESSURE: 105 MMHG

## 2024-10-04 DIAGNOSIS — C85.80 MARGINAL ZONE B-CELL LYMPHOMA (MULTI): ICD-10-CM

## 2024-10-04 PROCEDURE — 2500000001 HC RX 250 WO HCPCS SELF ADMINISTERED DRUGS (ALT 637 FOR MEDICARE OP): Performed by: INTERNAL MEDICINE

## 2024-10-04 PROCEDURE — 96401 CHEMO ANTI-NEOPL SQ/IM: CPT

## 2024-10-04 PROCEDURE — 2500000004 HC RX 250 GENERAL PHARMACY W/ HCPCS (ALT 636 FOR OP/ED): Mod: JZ | Performed by: INTERNAL MEDICINE

## 2024-10-04 RX ORDER — ACETAMINOPHEN 325 MG/1
650 TABLET ORAL ONCE
Status: COMPLETED | OUTPATIENT
Start: 2024-10-04 | End: 2024-10-04

## 2024-10-04 RX ORDER — DIPHENHYDRAMINE HCL 50 MG
50 CAPSULE ORAL ONCE
Status: COMPLETED | OUTPATIENT
Start: 2024-10-04 | End: 2024-10-04

## 2024-10-04 RX ORDER — FAMOTIDINE 10 MG/ML
20 INJECTION INTRAVENOUS ONCE AS NEEDED
Status: DISCONTINUED | OUTPATIENT
Start: 2024-10-04 | End: 2024-10-04 | Stop reason: HOSPADM

## 2024-10-04 RX ORDER — EPINEPHRINE 0.3 MG/.3ML
0.3 INJECTION SUBCUTANEOUS EVERY 5 MIN PRN
Status: DISCONTINUED | OUTPATIENT
Start: 2024-10-04 | End: 2024-10-04 | Stop reason: HOSPADM

## 2024-10-04 RX ORDER — DIPHENHYDRAMINE HYDROCHLORIDE 50 MG/ML
50 INJECTION INTRAMUSCULAR; INTRAVENOUS AS NEEDED
Status: DISCONTINUED | OUTPATIENT
Start: 2024-10-04 | End: 2024-10-04 | Stop reason: HOSPADM

## 2024-10-04 RX ORDER — PROCHLORPERAZINE MALEATE 10 MG
10 TABLET ORAL EVERY 6 HOURS PRN
Status: DISCONTINUED | OUTPATIENT
Start: 2024-10-04 | End: 2024-10-04 | Stop reason: HOSPADM

## 2024-10-04 RX ORDER — ALBUTEROL SULFATE 0.83 MG/ML
3 SOLUTION RESPIRATORY (INHALATION) AS NEEDED
Status: DISCONTINUED | OUTPATIENT
Start: 2024-10-04 | End: 2024-10-04 | Stop reason: HOSPADM

## 2024-10-04 RX ORDER — PROCHLORPERAZINE EDISYLATE 5 MG/ML
10 INJECTION INTRAMUSCULAR; INTRAVENOUS EVERY 6 HOURS PRN
Status: DISCONTINUED | OUTPATIENT
Start: 2024-10-04 | End: 2024-10-04 | Stop reason: HOSPADM

## 2024-10-04 ASSESSMENT — PAIN SCALES - GENERAL: PAINLEVEL: 0-NO PAIN

## 2024-10-04 NOTE — PROGRESS NOTES
Patient is here today for C1 D15  Rituxan Hycela injection - no complications since last being seen-  Independent double check done prior to injection today-   b/h/ lung sounds not auscultated  Patient tolerated injection well. Remained asymptomatic during post 15 min observation. No complaints. Call back instructions reviewed.    Patient verbalizes understanding of plan of care.  Ambulated off unit without difficulty, steady gait.

## 2024-10-04 NOTE — PROGRESS NOTES
This  met with this pt for the first time to assess needs and introduce SW services available. The pt reports she is doing very well and doesn't have any needs at this time. She did report she is aware of [facility], but does not need to utilize the services at this time.     The pt was appreciative of assistance offered. The SW will continue to follow up.

## 2024-10-07 ENCOUNTER — HOSPITAL ENCOUNTER (OUTPATIENT)
Dept: RADIOLOGY | Facility: CLINIC | Age: 64
Discharge: HOME | End: 2024-10-07
Payer: COMMERCIAL

## 2024-10-07 DIAGNOSIS — R59.0 RETROPERITONEAL LYMPHADENOPATHY: ICD-10-CM

## 2024-10-07 DIAGNOSIS — R93.5 ABNORMAL CT OF THE ABDOMEN: ICD-10-CM

## 2024-10-07 PROCEDURE — 2550000001 HC RX 255 CONTRASTS: Performed by: INTERNAL MEDICINE

## 2024-10-07 PROCEDURE — 70553 MRI BRAIN STEM W/O & W/DYE: CPT

## 2024-10-07 PROCEDURE — 70553 MRI BRAIN STEM W/O & W/DYE: CPT | Performed by: RADIOLOGY

## 2024-10-07 PROCEDURE — A9575 INJ GADOTERATE MEGLUMI 0.1ML: HCPCS | Performed by: INTERNAL MEDICINE

## 2024-10-07 RX ORDER — GADOTERATE MEGLUMINE 376.9 MG/ML
10 INJECTION INTRAVENOUS
Status: COMPLETED | OUTPATIENT
Start: 2024-10-07 | End: 2024-10-07

## 2024-10-11 ENCOUNTER — OFFICE VISIT (OUTPATIENT)
Dept: HEMATOLOGY/ONCOLOGY | Facility: CLINIC | Age: 64
End: 2024-10-11
Payer: COMMERCIAL

## 2024-10-11 ENCOUNTER — INFUSION (OUTPATIENT)
Dept: HEMATOLOGY/ONCOLOGY | Facility: CLINIC | Age: 64
End: 2024-10-11
Payer: COMMERCIAL

## 2024-10-11 ENCOUNTER — EDUCATION (OUTPATIENT)
Dept: HEMATOLOGY/ONCOLOGY | Facility: CLINIC | Age: 64
End: 2024-10-11

## 2024-10-11 VITALS
WEIGHT: 113.76 LBS | RESPIRATION RATE: 16 BRPM | SYSTOLIC BLOOD PRESSURE: 100 MMHG | DIASTOLIC BLOOD PRESSURE: 63 MMHG | OXYGEN SATURATION: 97 % | TEMPERATURE: 98.4 F | HEART RATE: 69 BPM | BODY MASS INDEX: 20.15 KG/M2

## 2024-10-11 DIAGNOSIS — C85.80 MARGINAL ZONE B-CELL LYMPHOMA (MULTI): ICD-10-CM

## 2024-10-11 PROCEDURE — 99215 OFFICE O/P EST HI 40 MIN: CPT | Performed by: INTERNAL MEDICINE

## 2024-10-11 PROCEDURE — 2500000001 HC RX 250 WO HCPCS SELF ADMINISTERED DRUGS (ALT 637 FOR MEDICARE OP): Performed by: INTERNAL MEDICINE

## 2024-10-11 PROCEDURE — 2500000004 HC RX 250 GENERAL PHARMACY W/ HCPCS (ALT 636 FOR OP/ED): Mod: JZ | Performed by: INTERNAL MEDICINE

## 2024-10-11 PROCEDURE — 96401 CHEMO ANTI-NEOPL SQ/IM: CPT

## 2024-10-11 RX ORDER — PROCHLORPERAZINE MALEATE 10 MG
10 TABLET ORAL EVERY 6 HOURS PRN
Status: DISCONTINUED | OUTPATIENT
Start: 2024-10-11 | End: 2024-10-11 | Stop reason: HOSPADM

## 2024-10-11 RX ORDER — ACETAMINOPHEN 325 MG/1
650 TABLET ORAL ONCE
Status: COMPLETED | OUTPATIENT
Start: 2024-10-11 | End: 2024-10-11

## 2024-10-11 RX ORDER — PROCHLORPERAZINE EDISYLATE 5 MG/ML
10 INJECTION INTRAMUSCULAR; INTRAVENOUS EVERY 6 HOURS PRN
Status: DISCONTINUED | OUTPATIENT
Start: 2024-10-11 | End: 2024-10-11 | Stop reason: HOSPADM

## 2024-10-11 RX ORDER — ALBUTEROL SULFATE 0.83 MG/ML
3 SOLUTION RESPIRATORY (INHALATION) AS NEEDED
Status: DISCONTINUED | OUTPATIENT
Start: 2024-10-11 | End: 2024-10-11 | Stop reason: HOSPADM

## 2024-10-11 RX ORDER — DIPHENHYDRAMINE HCL 50 MG
50 CAPSULE ORAL ONCE
Status: COMPLETED | OUTPATIENT
Start: 2024-10-11 | End: 2024-10-11

## 2024-10-11 RX ORDER — DIPHENHYDRAMINE HYDROCHLORIDE 50 MG/ML
50 INJECTION INTRAMUSCULAR; INTRAVENOUS AS NEEDED
Status: DISCONTINUED | OUTPATIENT
Start: 2024-10-11 | End: 2024-10-11 | Stop reason: HOSPADM

## 2024-10-11 RX ORDER — FAMOTIDINE 10 MG/ML
20 INJECTION INTRAVENOUS ONCE AS NEEDED
Status: DISCONTINUED | OUTPATIENT
Start: 2024-10-11 | End: 2024-10-11 | Stop reason: HOSPADM

## 2024-10-11 RX ORDER — EPINEPHRINE 0.3 MG/.3ML
0.3 INJECTION SUBCUTANEOUS EVERY 5 MIN PRN
Status: DISCONTINUED | OUTPATIENT
Start: 2024-10-11 | End: 2024-10-11 | Stop reason: HOSPADM

## 2024-10-11 ASSESSMENT — PAIN SCALES - GENERAL: PAINLEVEL: 0-NO PAIN

## 2024-10-11 NOTE — PROGRESS NOTES
Patient ID: Cecelia Birmingham is a 64 y.o. female.  Referring Physician: No referring provider defined for this encounter.  Primary Care Provider: Heriberto Hebert MD  Visit Type: Initial Visit  Bone marrow aspiration and biopsy on August 2, 2024 revealed hypercellular bone marrow with involvement by CD5, CD10 negative lymphoproliferative disorder.    Plasma cell population with cytoplasmic kappa excess 5% present.    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    IgM kappa 2.2 g/dL, beta-2 microglobulin 3.7 mcg/dL.  Chromosomal analysis revealed 46XX, +4, +6, add(6)(q12)x2,-8[2]/46XX del(6)(q12q25)[2]/46XX[17]  Retroperitoneal lymphadenopathy.  Started single agent weekly Rituxan on 9/20/24  Subjective    HPI  The patient was referred to me for further evaluation and management of retroperitoneal lymphadenopathy, evaluated on March 14, 2024.  The patient was doing well till October 25, 2023 when she noticed acute onset of sharp mid abdominal pain shortly after awakening from sleep.  It persisted through the day with associated bloating.  The patient worked the whole day but eventually presented to ED in [facility].  Did not have any fevers chills sweats nausea vomiting diarrhea constipation hematemesis melena hematochezia materia.  The patient was discharged on antibiotics after CT scans.  The patient claims that she is felt better bloating is resolved she does have minimal residual abdominal discomfort.  CT scan revealed aortocaval and left periaortic ill-defined soft tissue extending below the aortic bifurcation.  Differential diagnosis thought to be retroperitoneal fibrosis, idiopathic, not aneurysmal form of colonic aortitis or lymphoma.  There was no associated hydronephrosis.  A 2.8 cm mixed hyperattenuating and hypoattenuating mass was noted in the left lobe of the liver.  A tiny 7 mm lesion was noted in the lower pole of the right kidney.  [facility] revealed simple cyst in the right kidney 5 mm at the lower pole.  Hemangioma noted in the liver corresponding to the CT finding.  The retroperitoneal soft tissue fullness is confirmed and not significantly different than the CT imaging.  The patient has never had abdominal operation.CT-guided biopsy on January 12, 2024 revealed limited tissue with an atypical lymphoid infiltrate.  Flow cytometry on February 6, 2024 revealed CD5 negative, CD10 negative, kappa restricted B-cell population 4% of total events no abnormal T-cell population identified.    At interview on March 14, 2024 the patient narrated entire history.  Denied history of weight loss, fevers, night sweats, chest pain, shortness of breath, nausea, vomiting, hematemesis, melena, hematochezia and hematuria.    The patient had called for follow-up on July 11, 2024 regarding history of abdominal pain and abnormal CT scans the patient complains of frontal headaches.  Was evaluated by Dr. Hebert and noted to have low iron saturation of 10%.  She was placed on ferrous sulfate 325 mg p.o. daily.  Headaches persist.  Also, patient had retroperitoneal lymphadenopathy and biopsies were attempted twice but inconclusive.  A PET scan was ordered.  Patient anxious to know results of the tests performed.    The patient had called for follow-up on August 23, 2024 regarding history of abdominal pain and abnormal CT scans the patient complains of frontal headaches.  Was evaluated by Dr. Hebert and noted to have low iron saturation of 10%.  She was placed on ferrous sulfate 325 mg p.o. daily.  Headaches persist.  Also, patient had retroperitoneal lymphadenopathy and biopsies were attempted twice but inconclusive.  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  The patient had called for follow-up on September 13 2024 regarding history of abdominal pain and abnormal CT scans the patient complains of frontal headaches.  Was evaluated by Dr. Hebert and noted to have low iron saturation of 10%.  She was placed on ferrous sulfate 325 mg p.o. daily.  Headaches persist.  Also, patient had retroperitoneal lymphadenopathy and biopsies were attempted twice but inconclusive.  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  The patient claims that she can carry on these work but feels fatigued at the end of the day and gives herself a headache.  In addition the patient also has abdominal pain.  Ophthalmology evaluation at [facility] did not reveal any abnormality related to elevated protein count/elevated viscosity.    The patient had come  for follow-up on October 11, 2024 regarding history of abdominal pain and abnormal CT scans the patient complains of frontal headaches.  Was evaluated by Dr. Hebert and noted to have low iron saturation of 10%.  She was placed on ferrous sulfate 325 mg p.o. daily.  Headaches persist.  Also, patient had retroperitoneal lymphadenopathy and biopsies were attempted twice but inconclusive.  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  The patient claims that she can carry on  work but feels fatigued at the end of the day and gives herself a headache.  In addition the patient also has abdominal pain.  Ophthalmology evaluation at [facility] did not reveal any abnormality related to  elevated protein count/elevated viscosity.  Because of progressive and persistent symptoms the patient was started on weekly Rituxan beginning September 20, 2024.  After 1 treatment the patient claims that she is beginning to feel better Previously noted abdominal pain and headaches are less.  Cycle 4 today M protein down to 1.5 g/dL.    Past medical history:    As above.    Past surgical history:  History of uterine ablation on March 2, 2020    Mammogram:    2 years ago.    Colonoscopy:    4 years ago.        Review of Systems   All other systems reviewed and are negative.       Objective   BSA: 1.51 meters squared  /63   Pulse 69   Temp 36.9 °C (98.4 °F) (Temporal)   Resp 16   Wt 51.6 kg (113 lb 12.1 oz)   SpO2 97%   BMI 20.15 kg/m²      has no past medical history on file.   has a past surgical history that includes Other surgical history (03/02/2020).  Family History   Problem Relation Name Age of Onset    Breast cancer Other Grandparent      Oncology History   Marginal zone B-cell lymphoma (Multi)   9/5/2024 Initial Diagnosis    Marginal zone B-cell lymphoma (Multi)     9/20/2024 -  Chemotherapy    RiTUXimab (Weekly), 28 Day Cycle          Cecelia Birmingham  reports that she has never smoked. She has never been exposed to tobacco smoke. She has never used smokeless tobacco.  She  reports current alcohol use of about 2.0 standard drinks of alcohol per week.  She  reports no history of drug use.    Physical Exam  Constitutional:       Appearance: Normal appearance.   HENT:      Head: Normocephalic and atraumatic.      Nose: Nose normal.      Mouth/Throat:      Mouth: Mucous membranes are moist.      Pharynx: Oropharynx is clear.   Eyes:      Extraocular Movements: Extraocular movements intact.      Conjunctiva/sclera: Conjunctivae normal.      Pupils: Pupils are equal, round, and reactive to light.   Cardiovascular:      Rate and Rhythm: Normal rate and regular rhythm.   Pulmonary:      Effort:  Pulmonary effort is normal.      Breath sounds: Normal breath sounds.   Abdominal:      General: Abdomen is flat. Bowel sounds are normal.      Palpations: Abdomen is soft.   Musculoskeletal:         General: Normal range of motion.      Cervical back: Normal range of motion and neck supple.   Neurological:      General: No focal deficit present.      Mental Status: She is alert and oriented to person, place, and time. Mental status is at baseline.   Psychiatric:         Mood and Affect: Mood normal.         Behavior: Behavior normal.         Thought Content: Thought content normal.         Judgment: Judgment normal.         WBC   Date/Time Value Ref Range Status   09/13/2024 09:58 AM 6.7 4.4 - 11.3 x10*3/uL Final   08/23/2024 11:41 AM 6.7 4.4 - 11.3 x10*3/uL Final   06/27/2024 08:23 AM 5.6 4.4 - 11.3 x10*3/uL Final     nRBC   Date Value Ref Range Status   09/13/2024   Final     Comment:     Not Measured   08/23/2024   Final     Comment:     Not Measured   06/27/2024 0.0 0.0 - 0.0 /100 WBCs Final     RBC   Date Value Ref Range Status   09/13/2024 4.31 4.00 - 5.20 x10*6/uL Final   08/23/2024 4.27 4.00 - 5.20 x10*6/uL Final   06/27/2024 4.02 4.00 - 5.20 x10*6/uL Final     Hemoglobin   Date Value Ref Range Status   09/13/2024 12.5 12.0 - 16.0 g/dL Final   08/23/2024 12.3 12.0 - 16.0 g/dL Final   06/27/2024 11.3 (L) 12.0 - 16.0 g/dL Final     Hematocrit   Date Value Ref Range Status   09/13/2024 37.7 36.0 - 46.0 % Final   08/23/2024 37.2 36.0 - 46.0 % Final   06/27/2024 35.3 (L) 36.0 - 46.0 % Final     MCV   Date/Time Value Ref Range Status   09/13/2024 09:58 AM 88 80 - 100 fL Final   08/23/2024 11:41 AM 87 80 - 100 fL Final   06/27/2024 08:23 AM 88 80 - 100 fL Final     MCH   Date/Time Value Ref Range Status   09/13/2024 09:58 AM 29.0 26.0 - 34.0 pg Final   08/23/2024 11:41 AM 28.8 26.0 - 34.0 pg Final   06/27/2024 08:23 AM 28.1 26.0 - 34.0 pg Final     MCHC   Date/Time Value Ref Range Status   09/13/2024 09:58 AM 33.2  "32.0 - 36.0 g/dL Final   08/23/2024 11:41 AM 33.1 32.0 - 36.0 g/dL Final   06/27/2024 08:23 AM 32.0 32.0 - 36.0 g/dL Final     RDW   Date/Time Value Ref Range Status   09/13/2024 09:58 AM 14.4 11.5 - 14.5 % Final   08/23/2024 11:41 AM 14.1 11.5 - 14.5 % Final   06/27/2024 08:23 AM 13.9 11.5 - 14.5 % Final     Platelets   Date/Time Value Ref Range Status   09/13/2024 09:58  150 - 450 x10*3/uL Final   08/23/2024 11:41  150 - 450 x10*3/uL Final   06/27/2024 08:23  150 - 450 x10*3/uL Final     No results found for: \"MPV\"  Neutrophils %   Date/Time Value Ref Range Status   09/13/2024 09:58 AM 71.8 40.0 - 80.0 % Final   08/23/2024 11:41 AM 77.2 40.0 - 80.0 % Final   06/27/2024 08:23 AM 65.0 40.0 - 80.0 % Final     Immature Granulocytes %, Automated   Date/Time Value Ref Range Status   09/13/2024 09:58 AM 0.1 0.0 - 0.9 % Final     Comment:     Immature Granulocyte Count (IG) includes promyelocytes, myelocytes and metamyelocytes but does not include bands. Percent differential counts (%) should be interpreted in the context of the absolute cell counts (cells/UL).   08/23/2024 11:41 AM 0.1 0.0 - 0.9 % Final     Comment:     Immature Granulocyte Count (IG) includes promyelocytes, myelocytes and metamyelocytes but does not include bands. Percent differential counts (%) should be interpreted in the context of the absolute cell counts (cells/UL).   06/27/2024 08:23 AM 0.2 0.0 - 0.9 % Final     Comment:     Immature Granulocyte Count (IG) includes promyelocytes, myelocytes and metamyelocytes but does not include bands. Percent differential counts (%) should be interpreted in the context of the absolute cell counts (cells/UL).     Lymphocytes %   Date/Time Value Ref Range Status   09/13/2024 09:58 AM 14.6 13.0 - 44.0 % Final   08/23/2024 11:41 AM 11.3 13.0 - 44.0 % Final   06/27/2024 08:23 AM 17.1 13.0 - 44.0 % Final     Monocytes %   Date/Time Value Ref Range Status   09/13/2024 09:58 AM 12.5 2.0 - 10.0 % Final "   08/23/2024 11:41 AM 10.7 2.0 - 10.0 % Final   06/27/2024 08:23 AM 15.6 2.0 - 10.0 % Final     Eosinophils %   Date/Time Value Ref Range Status   09/13/2024 09:58 AM 0.7 0.0 - 6.0 % Final   08/23/2024 11:41 AM 0.3 0.0 - 6.0 % Final   06/27/2024 08:23 AM 1.4 0.0 - 6.0 % Final     Basophils %   Date/Time Value Ref Range Status   09/13/2024 09:58 AM 0.3 0.0 - 2.0 % Final   08/23/2024 11:41 AM 0.4 0.0 - 2.0 % Final   06/27/2024 08:23 AM 0.7 0.0 - 2.0 % Final     Neutrophils Absolute   Date/Time Value Ref Range Status   09/13/2024 09:58 AM 4.80 1.20 - 7.70 x10*3/uL Final     Comment:     Percent differential counts (%) should be interpreted in the context of the absolute cell counts (cells/uL).   08/23/2024 11:41 AM 5.17 1.20 - 7.70 x10*3/uL Final     Comment:     Percent differential counts (%) should be interpreted in the context of the absolute cell counts (cells/uL).   06/27/2024 08:23 AM 3.62 1.20 - 7.70 x10*3/uL Final     Comment:     Percent differential counts (%) should be interpreted in the context of the absolute cell counts (cells/uL).     Immature Granulocytes Absolute, Automated   Date/Time Value Ref Range Status   09/13/2024 09:58 AM 0.01 0.00 - 0.70 x10*3/uL Final   08/23/2024 11:41 AM 0.01 0.00 - 0.70 x10*3/uL Final   06/27/2024 08:23 AM 0.01 0.00 - 0.70 x10*3/uL Final     Lymphocytes Absolute   Date/Time Value Ref Range Status   09/13/2024 09:58 AM 0.98 (L) 1.20 - 4.80 x10*3/uL Final   08/23/2024 11:41 AM 0.76 (L) 1.20 - 4.80 x10*3/uL Final   06/27/2024 08:23 AM 0.95 (L) 1.20 - 4.80 x10*3/uL Final     Monocytes Absolute   Date/Time Value Ref Range Status   09/13/2024 09:58 AM 0.84 0.10 - 1.00 x10*3/uL Final   08/23/2024 11:41 AM 0.72 0.10 - 1.00 x10*3/uL Final   06/27/2024 08:23 AM 0.87 0.10 - 1.00 x10*3/uL Final     Eosinophils Absolute   Date/Time Value Ref Range Status   09/13/2024 09:58 AM 0.05 0.00 - 0.70 x10*3/uL Final   08/23/2024 11:41 AM 0.02 0.00 - 0.70 x10*3/uL Final   06/27/2024 08:23 AM  "0.08 0.00 - 0.70 x10*3/uL Final     Basophils Absolute   Date/Time Value Ref Range Status   09/13/2024 09:58 AM 0.02 0.00 - 0.10 x10*3/uL Final   08/23/2024 11:41 AM 0.03 0.00 - 0.10 x10*3/uL Final   06/27/2024 08:23 AM 0.04 0.00 - 0.10 x10*3/uL Final       No components found for: \"PT\"  No results found for: \"APTT\"    Assessment/Plan      The patient is a 63-year-old woman who presented in October 2023 with abdominal pain.  She was evaluated at [facility] ED in [place] and the CT scan revealed possible retroperitoneal lymphadenopathy initial biopsy was inconclusive repeat biopsy with flow cytometry revealed CD5 negative, CD10 negative kappa restricted lymphocytes, 4% of events.  Would like to obtain more information and if necessary consider bone marrow aspiration and biopsy.  The patient is agreeable for follow-up and return in 4 weeks.    The patient had called for follow-up on March 21, 2024 regarding history of abdominal pain and abnormal CT scan with retroperitoneal lymphadenopathy/thickening.  Physical examination was within normal limits.  I have personally discussed with Dr. Caicedo.  Initially a CT-guided biopsy was performed the specimen was inadequate.  Second biopsy flow cytometry was performed but once again it was inconclusive.  I explained to the patient above findings.  Options include    Repeat CT-guided biopsy again.    Do nothing.    Repeat blood work and CT scan of chest abdomen pelvis in 4 months and consider a biopsy if abnormalities are found.  The patient has requested follow-up in 4 months and CT scan and blood work.  The patient was appreciative of the details provided and grateful.  The patient had called for follow-up on July 11, 2024 regarding history of abdominal pain and abnormal CT scans the patient complains of frontal headaches.  Was evaluated by Dr. Hebert and noted to have low iron saturation of 10%.  She was placed on ferrous sulfate 325 mg p.o. daily.  Headaches persist.  Also, patient " had retroperitoneal lymphadenopathy and biopsies were attempted twice but inconclusive.  A PET scan was ordered.  Patient anxious to know results of the tests performed.  IgM kappa M protein 2.2 g/dL.  Very suspicious for low-grade lymphoma.  Recall that CT scan of abdomen pelvis had revealed retroperitoneal lymphadenopathy.  CT-guided biopsies attempted twice were inconclusive.  PET scan shows mild FDG avidity in bilateral axilla lymph nodes as well as retroperitoneal lymph nodes but no discrete mass.  If agreeable I have recommended a bone marrow aspiration and biopsy.  The patient had called for follow-up on August 23, 2024 regarding history of abdominal pain and abnormal CT scans the patient complains of frontal headaches.  Was evaluated by Dr. Hebert and noted to have low iron saturation of 10%.  She was placed on ferrous sulfate 325 mg p.o. daily.  Headaches persist.  Also, patient had retroperitoneal lymphadenopathy and biopsies were attempted twice but inconclusive.  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  I have recommended serum viscosity, ophthalmology evaluation for retinal exam.  The patient is symptomatic and requesting assistance.  Consider single agent Rituxan SQ every 4 weeks after completion of evaluation.  Patient to return in 4 weeks.    Anemia:    Previous hemoglobin 11.1 g/dL recent hemoglobin in June 2024 had improved up to 11.3 g/dL.  Iron saturation decreased at 10%.  The patient was placed on ferrous sulfate 325 mg p.o. daily by .  I had a detailed discussion with the patient and explained to her that her symptoms do not necessarily correlate with her hemoglobin levels or low iron saturation.  However, the patient is convinced that her headache  is due to low iron level.  However, I have given her a benefit of doubt and recommended increasing ferrous sulfate 325 mg p.o. half an hour after food daily, slowly increase to 3 times a day reassess in 6 weeks.  I also explained to the patient that it generally takes 4 to 6 weeks for hemoglobin to normalize after initiating iron therapy and 3 months to replenish iron stores.  If the patient does correct with iron replacement therapy it would suggest iron deficiency and would recommend GI evaluation.  The patient is agreeable.  The patient had called for follow-up on August 23, 2024 regarding history of abdominal pain and abnormal CT scans the patient complains of frontal headaches.  Was evaluated by Dr. Hebert and noted to have low iron saturation of 10%.  She was placed on ferrous sulfate 325 mg p.o. daily.  Headaches persist.  Also, patient had retroperitoneal lymphadenopathy and biopsies were attempted twice but inconclusive.  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  The patient has various symptoms such as abdominal pain, easy fatigability.  Although, she is able to carry on daily activities but feels very fatigued and gives herself a headache.  She has had headaches for many years.  However, the other symptoms I have given her a benefit of doubt and recommended single agent Rituxan given weekly.  Printed material from [facility] provided.  Informed consent obtained.  Patient to initiate cycle 1 Rituxan on September 20, 2024.  Return in 2 weeks.  The patient had come  for follow-up on October 11 , 2024 regarding history of abdominal pain and abnormal CT scans the patient complains of frontal headaches.  Was evaluated by Dr. Hebert and noted to have low iron saturation of 10%.  She  was placed on ferrous sulfate 325 mg p.o. daily.  Headaches persist.  Also, patient had retroperitoneal lymphadenopathy and biopsies were attempted twice but inconclusive.  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  The patient claims that she can carry on these work but feels fatigued at the end of the day and gives herself a headache.  In addition the patient also has abdominal pain.  Ophthalmology evaluation at [facility] did not reveal any abnormality related to elevated protein count/elevated viscosity.  Because of progressive and persistent symptoms the patient was started on weekly Rituxan beginning September 20, 2024.  After 1 treatment the patient claims that she is beginning to feel better.  Previously noted abdominal pain and headaches are less.  M protein down to 1.5 g/dL.  Cycle 4 of weekly Rituxan today.  I have recommended restaging PET scan.  See me in 2 weeks.    Thank you for allowing me to participate in care of your patient if you have any questions please feel free to call me.  Diagnoses and all orders for this visit:  Retroperitoneal lymphadenopathy  -     Referral to Hematology and Oncology         Jefferson Morales MD

## 2024-10-11 NOTE — PROGRESS NOTES
"Patient seen by Dr. Juan today in clinic. Reinforcement education provided regarding next steps with plan of care.      PER DR. JUAN'S FUV NOTE TODAY: \"The patient had come  for follow-up on October 11 , 2024 regarding history of abdominal pain and abnormal CT scans the patient complains of frontal headaches.  Was evaluated by Dr. Hebert and noted to have low iron saturation of 10%.  She was placed on ferrous sulfate 325 mg p.o. daily.  Headaches persist.  Also, patient had retroperitoneal lymphadenopathy and biopsies were attempted twice but inconclusive.  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  The patient claims that she can carry on these work but feels fatigued at the end of the day and gives herself a headache.  In addition the patient also has abdominal pain.  Ophthalmology evaluation at [facility] did not reveal any abnormality related to elevated protein count/elevated viscosity.  Because of progressive and persistent symptoms the patient was started on weekly Rituxan beginning September 20, 2024.  After 1 treatment the patient claims that she is beginning to feel better.  Previously noted abdominal pain and headaches are less.  M protein down to 1.5 g/dL.  Cycle 4 of weekly Rituxan today.  I have recommended restaging PET scan.  See me in 2 weeks. \"    Instructed on PET scan order and additional lab orders placed.  Pt opting to have labs drawn next week.  FUV in 2 weeks per availability.  Patient verbalizes understanding of plan of care via teachback method.             "

## 2024-10-14 ENCOUNTER — TELEPHONE (OUTPATIENT)
Dept: HEMATOLOGY/ONCOLOGY | Facility: CLINIC | Age: 64
End: 2024-10-14

## 2024-10-14 ENCOUNTER — LAB (OUTPATIENT)
Dept: LAB | Facility: LAB | Age: 64
End: 2024-10-14

## 2024-10-14 DIAGNOSIS — C85.80 MARGINAL ZONE B-CELL LYMPHOMA (MULTI): ICD-10-CM

## 2024-10-14 LAB
B2 MICROGLOB SERPL-MCNC: 3.9 MG/L (ref 0.7–2.2)
BASOPHILS # BLD AUTO: 0.05 X10*3/UL (ref 0–0.1)
BASOPHILS NFR BLD AUTO: 1.1 %
EOSINOPHIL # BLD AUTO: 0.05 X10*3/UL (ref 0–0.7)
EOSINOPHIL NFR BLD AUTO: 1.1 %
ERYTHROCYTE [DISTWIDTH] IN BLOOD BY AUTOMATED COUNT: 13.9 % (ref 11.5–14.5)
FERRITIN SERPL-MCNC: 64 NG/ML (ref 8–150)
FOLATE SERPL-MCNC: 22.7 NG/ML
HCT VFR BLD AUTO: 34.4 % (ref 36–46)
HGB BLD-MCNC: 11.3 G/DL (ref 12–16)
IMM GRANULOCYTES # BLD AUTO: 0.01 X10*3/UL (ref 0–0.7)
IMM GRANULOCYTES NFR BLD AUTO: 0.2 % (ref 0–0.9)
IRON SATN MFR SERPL: 23 % (ref 25–45)
IRON SERPL-MCNC: 70 UG/DL (ref 35–150)
LDH SERPL L TO P-CCNC: 114 U/L (ref 84–246)
LYMPHOCYTES # BLD AUTO: 0.73 X10*3/UL (ref 1.2–4.8)
LYMPHOCYTES NFR BLD AUTO: 15.8 %
MCH RBC QN AUTO: 28.3 PG (ref 26–34)
MCHC RBC AUTO-ENTMCNC: 32.8 G/DL (ref 32–36)
MCV RBC AUTO: 86 FL (ref 80–100)
MONOCYTES # BLD AUTO: 0.82 X10*3/UL (ref 0.1–1)
MONOCYTES NFR BLD AUTO: 17.7 %
NEUTROPHILS # BLD AUTO: 2.97 X10*3/UL (ref 1.2–7.7)
NEUTROPHILS NFR BLD AUTO: 64.1 %
NRBC BLD-RTO: 0 /100 WBCS (ref 0–0)
PLATELET # BLD AUTO: 490 X10*3/UL (ref 150–450)
PROT SERPL-MCNC: 8 G/DL (ref 6.4–8.2)
RBC # BLD AUTO: 4 X10*6/UL (ref 4–5.2)
TIBC SERPL-MCNC: 306 UG/DL (ref 240–445)
TSH SERPL-ACNC: 1.34 MIU/L (ref 0.44–3.98)
UIBC SERPL-MCNC: 236 UG/DL (ref 110–370)
VIT B12 SERPL-MCNC: 1177 PG/ML (ref 211–911)
WBC # BLD AUTO: 4.6 X10*3/UL (ref 4.4–11.3)

## 2024-10-14 PROCEDURE — 83521 IG LIGHT CHAINS FREE EACH: CPT

## 2024-10-14 PROCEDURE — 84165 PROTEIN E-PHORESIS SERUM: CPT

## 2024-10-14 PROCEDURE — 82728 ASSAY OF FERRITIN: CPT

## 2024-10-14 PROCEDURE — 86320 SERUM IMMUNOELECTROPHORESIS: CPT | Performed by: INTERNAL MEDICINE

## 2024-10-14 PROCEDURE — 84165 PROTEIN E-PHORESIS SERUM: CPT | Performed by: INTERNAL MEDICINE

## 2024-10-14 PROCEDURE — 86334 IMMUNOFIX E-PHORESIS SERUM: CPT

## 2024-10-14 PROCEDURE — 36415 COLL VENOUS BLD VENIPUNCTURE: CPT

## 2024-10-14 PROCEDURE — 84155 ASSAY OF PROTEIN SERUM: CPT

## 2024-10-14 PROCEDURE — 85025 COMPLETE CBC W/AUTO DIFF WBC: CPT

## 2024-10-14 PROCEDURE — 82746 ASSAY OF FOLIC ACID SERUM: CPT

## 2024-10-14 PROCEDURE — 85810 BLOOD VISCOSITY EXAMINATION: CPT

## 2024-10-14 PROCEDURE — 83540 ASSAY OF IRON: CPT

## 2024-10-14 PROCEDURE — 82232 ASSAY OF BETA-2 PROTEIN: CPT

## 2024-10-14 PROCEDURE — 83550 IRON BINDING TEST: CPT

## 2024-10-14 PROCEDURE — 82607 VITAMIN B-12: CPT

## 2024-10-14 PROCEDURE — 83615 LACTATE (LD) (LDH) ENZYME: CPT

## 2024-10-14 PROCEDURE — 84443 ASSAY THYROID STIM HORMONE: CPT

## 2024-10-14 NOTE — TELEPHONE ENCOUNTER
Patient has met deductible and would like to know if she is to continue Rituxan.  She had labs drawn today and not sure if Dr. Morales is waiting for PET scan results to determine next steps.  She also would prefer the Rituxan injection as she has had before.    Told her I would review with provider and let her know on Thursday.

## 2024-10-15 LAB
HETEROPH AB SER QL: 2.79 CP (ref 1.5–1.8)
KAPPA LC SERPL-MCNC: 5.63 MG/DL (ref 0.33–1.94)
KAPPA LC/LAMBDA SER: 17.06 {RATIO} (ref 0.26–1.65)
LAMBDA LC SERPL-MCNC: 0.33 MG/DL (ref 0.57–2.63)

## 2024-10-16 LAB
ALBUMIN: 3.5 G/DL (ref 3.4–5)
ALPHA 1 GLOBULIN: 0.4 G/DL (ref 0.2–0.6)
ALPHA 2 GLOBULIN: 0.5 G/DL (ref 0.4–1.1)
BETA GLOBULIN: 3.3 G/DL (ref 0.5–1.2)
GAMMA GLOBULIN: 0.3 G/DL (ref 0.5–1.4)
IMMUNOFIXATION COMMENT: ABNORMAL
M-PROTEIN 1: 2.4 G/DL
M-PROTEIN 2: 0 G/DL
PATH REVIEW - SERUM IMMUNOFIXATION: ABNORMAL
PATH REVIEW-SERUM PROTEIN ELECTROPHORESIS: ABNORMAL
PROTEIN ELECTROPHORESIS COMMENT: ABNORMAL

## 2024-10-16 NOTE — TELEPHONE ENCOUNTER
Spoke with Dr. Morales he is waiting for the PET Scan results before making a determination.  Patient notified.  She is paniced since her viscosity numbers went up and wonders if this is cause of fatigue and headaches.  Told her to discuss with Dr. Morales when she sees him on 11/08/24.

## 2024-10-17 DIAGNOSIS — C85.80 MARGINAL ZONE B-CELL LYMPHOMA (MULTI): Primary | ICD-10-CM

## 2024-10-17 RX ORDER — ACETAMINOPHEN 325 MG/1
650 TABLET ORAL ONCE
OUTPATIENT
Start: 2024-10-24

## 2024-10-17 RX ORDER — DIPHENHYDRAMINE HCL 50 MG
50 CAPSULE ORAL ONCE
OUTPATIENT
Start: 2024-11-14

## 2024-10-17 RX ORDER — ALBUTEROL SULFATE 0.83 MG/ML
3 SOLUTION RESPIRATORY (INHALATION) AS NEEDED
OUTPATIENT
Start: 2024-10-24

## 2024-10-17 RX ORDER — DIPHENHYDRAMINE HCL 50 MG
50 CAPSULE ORAL ONCE
OUTPATIENT
Start: 2024-10-24

## 2024-10-17 RX ORDER — ACETAMINOPHEN 325 MG/1
650 TABLET ORAL ONCE
OUTPATIENT
Start: 2024-10-31

## 2024-10-17 RX ORDER — PROCHLORPERAZINE MALEATE 10 MG
10 TABLET ORAL EVERY 6 HOURS PRN
OUTPATIENT
Start: 2024-10-24

## 2024-10-17 RX ORDER — DIPHENHYDRAMINE HYDROCHLORIDE 50 MG/ML
50 INJECTION INTRAMUSCULAR; INTRAVENOUS AS NEEDED
OUTPATIENT
Start: 2024-11-07

## 2024-10-17 RX ORDER — PROCHLORPERAZINE MALEATE 10 MG
10 TABLET ORAL EVERY 6 HOURS PRN
OUTPATIENT
Start: 2024-10-31

## 2024-10-17 RX ORDER — ALBUTEROL SULFATE 0.83 MG/ML
3 SOLUTION RESPIRATORY (INHALATION) AS NEEDED
OUTPATIENT
Start: 2024-11-14

## 2024-10-17 RX ORDER — PROCHLORPERAZINE MALEATE 10 MG
10 TABLET ORAL EVERY 6 HOURS PRN
OUTPATIENT
Start: 2024-11-07

## 2024-10-17 RX ORDER — ALBUTEROL SULFATE 0.83 MG/ML
3 SOLUTION RESPIRATORY (INHALATION) AS NEEDED
OUTPATIENT
Start: 2024-11-07

## 2024-10-17 RX ORDER — PROCHLORPERAZINE EDISYLATE 5 MG/ML
10 INJECTION INTRAMUSCULAR; INTRAVENOUS EVERY 6 HOURS PRN
OUTPATIENT
Start: 2024-10-31

## 2024-10-17 RX ORDER — ACETAMINOPHEN 325 MG/1
650 TABLET ORAL ONCE
OUTPATIENT
Start: 2024-11-07

## 2024-10-17 RX ORDER — PROCHLORPERAZINE MALEATE 10 MG
10 TABLET ORAL EVERY 6 HOURS PRN
OUTPATIENT
Start: 2024-11-14

## 2024-10-17 RX ORDER — DIPHENHYDRAMINE HCL 50 MG
50 CAPSULE ORAL ONCE
OUTPATIENT
Start: 2024-11-07

## 2024-10-17 RX ORDER — PROCHLORPERAZINE EDISYLATE 5 MG/ML
10 INJECTION INTRAMUSCULAR; INTRAVENOUS EVERY 6 HOURS PRN
OUTPATIENT
Start: 2024-11-14

## 2024-10-17 RX ORDER — FAMOTIDINE 10 MG/ML
20 INJECTION INTRAVENOUS ONCE AS NEEDED
OUTPATIENT
Start: 2024-10-31

## 2024-10-17 RX ORDER — DIPHENHYDRAMINE HYDROCHLORIDE 50 MG/ML
50 INJECTION INTRAMUSCULAR; INTRAVENOUS AS NEEDED
OUTPATIENT
Start: 2024-11-14

## 2024-10-17 RX ORDER — ACETAMINOPHEN 325 MG/1
650 TABLET ORAL ONCE
OUTPATIENT
Start: 2024-11-14

## 2024-10-17 RX ORDER — PROCHLORPERAZINE EDISYLATE 5 MG/ML
10 INJECTION INTRAMUSCULAR; INTRAVENOUS EVERY 6 HOURS PRN
OUTPATIENT
Start: 2024-10-24

## 2024-10-17 RX ORDER — EPINEPHRINE 0.3 MG/.3ML
0.3 INJECTION SUBCUTANEOUS EVERY 5 MIN PRN
OUTPATIENT
Start: 2024-11-14

## 2024-10-17 RX ORDER — FAMOTIDINE 10 MG/ML
20 INJECTION INTRAVENOUS ONCE AS NEEDED
OUTPATIENT
Start: 2024-11-14

## 2024-10-17 RX ORDER — DIPHENHYDRAMINE HCL 50 MG
50 CAPSULE ORAL ONCE
OUTPATIENT
Start: 2024-10-31

## 2024-10-17 RX ORDER — ALBUTEROL SULFATE 0.83 MG/ML
3 SOLUTION RESPIRATORY (INHALATION) AS NEEDED
OUTPATIENT
Start: 2024-10-31

## 2024-10-17 RX ORDER — EPINEPHRINE 0.3 MG/.3ML
0.3 INJECTION SUBCUTANEOUS EVERY 5 MIN PRN
OUTPATIENT
Start: 2024-11-07

## 2024-10-17 RX ORDER — DIPHENHYDRAMINE HYDROCHLORIDE 50 MG/ML
50 INJECTION INTRAMUSCULAR; INTRAVENOUS AS NEEDED
OUTPATIENT
Start: 2024-10-24

## 2024-10-17 RX ORDER — PROCHLORPERAZINE EDISYLATE 5 MG/ML
10 INJECTION INTRAMUSCULAR; INTRAVENOUS EVERY 6 HOURS PRN
OUTPATIENT
Start: 2024-11-07

## 2024-10-17 RX ORDER — EPINEPHRINE 0.3 MG/.3ML
0.3 INJECTION SUBCUTANEOUS EVERY 5 MIN PRN
OUTPATIENT
Start: 2024-10-24

## 2024-10-17 RX ORDER — EPINEPHRINE 0.3 MG/.3ML
0.3 INJECTION SUBCUTANEOUS EVERY 5 MIN PRN
OUTPATIENT
Start: 2024-10-31

## 2024-10-17 RX ORDER — FAMOTIDINE 10 MG/ML
20 INJECTION INTRAVENOUS ONCE AS NEEDED
OUTPATIENT
Start: 2024-11-07

## 2024-10-17 RX ORDER — FAMOTIDINE 10 MG/ML
20 INJECTION INTRAVENOUS ONCE AS NEEDED
OUTPATIENT
Start: 2024-10-24

## 2024-10-17 RX ORDER — DIPHENHYDRAMINE HYDROCHLORIDE 50 MG/ML
50 INJECTION INTRAMUSCULAR; INTRAVENOUS AS NEEDED
OUTPATIENT
Start: 2024-10-31

## 2024-10-18 ENCOUNTER — TELEPHONE (OUTPATIENT)
Dept: HEMATOLOGY/ONCOLOGY | Facility: CLINIC | Age: 64
End: 2024-10-18

## 2024-10-18 NOTE — TELEPHONE ENCOUNTER
----- Message from Nurse Hodan CALVIN sent at 10/18/2024 10:11 AM EDT -----  Regarding: FW: appointment 10/24/24  Gwendolyn,  When you have time please advise. Evy  ----- Message -----  From: Valentina Barajas  Sent: 10/18/2024  10:01 AM EDT  To: [facility] Disease Team; #  Subject: appointment 10/24/24                             Good morning,     I spoke with patient to give her the scheduled times for her appointments for 10/24/24 . She states she was told that all treatments would be held off until after November 1 appointment of her scans. She also states that she is not able to do any appointment on any day but Friday. Please advise if the appointments scheduled on 10/24 and 10/31 are needed.     Thanks

## 2024-10-18 NOTE — TELEPHONE ENCOUNTER
Call placed to patient to discuss upcoming Rituxan appts.  Informed per Dr. Morales it is ok for her to have the PET scan on 11/1/24, FUV with Dr. Morales on 11/8/24 and receive Rituxan same day.  Pt desires Friday appts for Rituxan as follows:    Week 1-11/8/24  Week 2-11/15/24  Week 3-11/22/24  Week 4-11/29/24    Pt agreeable to this plan of care and denies further questions or needs.  Message to schedulers for scheduling.

## 2024-10-21 DIAGNOSIS — C85.80 MARGINAL ZONE B-CELL LYMPHOMA (MULTI): ICD-10-CM

## 2024-10-24 ENCOUNTER — APPOINTMENT (OUTPATIENT)
Dept: HEMATOLOGY/ONCOLOGY | Facility: CLINIC | Age: 64
End: 2024-10-24
Payer: COMMERCIAL

## 2024-10-29 ENCOUNTER — TELEPHONE (OUTPATIENT)
Dept: HEMATOLOGY/ONCOLOGY | Facility: CLINIC | Age: 64
End: 2024-10-29
Payer: COMMERCIAL

## 2024-10-31 ENCOUNTER — APPOINTMENT (OUTPATIENT)
Dept: HEMATOLOGY/ONCOLOGY | Facility: CLINIC | Age: 64
End: 2024-10-31
Payer: COMMERCIAL

## 2024-11-01 ENCOUNTER — HOSPITAL ENCOUNTER (OUTPATIENT)
Dept: RADIOLOGY | Facility: CLINIC | Age: 64
Discharge: HOME | End: 2024-11-01
Payer: COMMERCIAL

## 2024-11-01 DIAGNOSIS — C85.80 MARGINAL ZONE B-CELL LYMPHOMA (MULTI): ICD-10-CM

## 2024-11-01 LAB — GLUCOSE BLD MANUAL STRIP-MCNC: 85 MG/DL (ref 74–99)

## 2024-11-01 PROCEDURE — 3430000001 HC RX 343 DIAGNOSTIC RADIOPHARMACEUTICALS: Performed by: INTERNAL MEDICINE

## 2024-11-01 PROCEDURE — 82947 ASSAY GLUCOSE BLOOD QUANT: CPT

## 2024-11-01 PROCEDURE — A9552 F18 FDG: HCPCS | Performed by: INTERNAL MEDICINE

## 2024-11-01 PROCEDURE — 78815 PET IMAGE W/CT SKULL-THIGH: CPT | Mod: PS

## 2024-11-01 RX ORDER — FLUDEOXYGLUCOSE F 18 200 MCI/ML
12.86 INJECTION, SOLUTION INTRAVENOUS
Status: COMPLETED | OUTPATIENT
Start: 2024-11-01 | End: 2024-11-01

## 2024-11-07 ENCOUNTER — APPOINTMENT (OUTPATIENT)
Dept: HEMATOLOGY/ONCOLOGY | Facility: CLINIC | Age: 64
End: 2024-11-07
Payer: COMMERCIAL

## 2024-11-08 ENCOUNTER — OFFICE VISIT (OUTPATIENT)
Dept: HEMATOLOGY/ONCOLOGY | Facility: CLINIC | Age: 64
End: 2024-11-08
Payer: COMMERCIAL

## 2024-11-08 ENCOUNTER — INFUSION (OUTPATIENT)
Dept: HEMATOLOGY/ONCOLOGY | Facility: CLINIC | Age: 64
End: 2024-11-08
Payer: COMMERCIAL

## 2024-11-08 VITALS
TEMPERATURE: 97.7 F | SYSTOLIC BLOOD PRESSURE: 120 MMHG | OXYGEN SATURATION: 96 % | WEIGHT: 113.54 LBS | RESPIRATION RATE: 16 BRPM | HEART RATE: 73 BPM | DIASTOLIC BLOOD PRESSURE: 67 MMHG | BODY MASS INDEX: 20.11 KG/M2

## 2024-11-08 DIAGNOSIS — C85.80 MARGINAL ZONE B-CELL LYMPHOMA (MULTI): ICD-10-CM

## 2024-11-08 PROCEDURE — 96401 CHEMO ANTI-NEOPL SQ/IM: CPT

## 2024-11-08 PROCEDURE — 99215 OFFICE O/P EST HI 40 MIN: CPT | Performed by: INTERNAL MEDICINE

## 2024-11-08 PROCEDURE — 2500000001 HC RX 250 WO HCPCS SELF ADMINISTERED DRUGS (ALT 637 FOR MEDICARE OP): Performed by: INTERNAL MEDICINE

## 2024-11-08 PROCEDURE — 2500000004 HC RX 250 GENERAL PHARMACY W/ HCPCS (ALT 636 FOR OP/ED): Mod: JZ | Performed by: INTERNAL MEDICINE

## 2024-11-08 RX ORDER — ALBUTEROL SULFATE 0.83 MG/ML
3 SOLUTION RESPIRATORY (INHALATION) AS NEEDED
Status: DISCONTINUED | OUTPATIENT
Start: 2024-11-08 | End: 2024-11-08 | Stop reason: HOSPADM

## 2024-11-08 RX ORDER — EPINEPHRINE 0.3 MG/.3ML
0.3 INJECTION SUBCUTANEOUS EVERY 5 MIN PRN
Status: DISCONTINUED | OUTPATIENT
Start: 2024-11-08 | End: 2024-11-08 | Stop reason: HOSPADM

## 2024-11-08 RX ORDER — PROCHLORPERAZINE MALEATE 10 MG
10 TABLET ORAL EVERY 6 HOURS PRN
Status: DISCONTINUED | OUTPATIENT
Start: 2024-11-08 | End: 2024-11-08 | Stop reason: HOSPADM

## 2024-11-08 RX ORDER — PROCHLORPERAZINE EDISYLATE 5 MG/ML
10 INJECTION INTRAMUSCULAR; INTRAVENOUS EVERY 6 HOURS PRN
Status: DISCONTINUED | OUTPATIENT
Start: 2024-11-08 | End: 2024-11-08 | Stop reason: HOSPADM

## 2024-11-08 RX ORDER — DIPHENHYDRAMINE HCL 50 MG
50 CAPSULE ORAL ONCE
Status: COMPLETED | OUTPATIENT
Start: 2024-11-08 | End: 2024-11-08

## 2024-11-08 RX ORDER — FAMOTIDINE 10 MG/ML
20 INJECTION INTRAVENOUS ONCE AS NEEDED
Status: DISCONTINUED | OUTPATIENT
Start: 2024-11-08 | End: 2024-11-08 | Stop reason: HOSPADM

## 2024-11-08 RX ORDER — ACETAMINOPHEN 325 MG/1
650 TABLET ORAL ONCE
Status: COMPLETED | OUTPATIENT
Start: 2024-11-08 | End: 2024-11-08

## 2024-11-08 RX ORDER — DIPHENHYDRAMINE HYDROCHLORIDE 50 MG/ML
50 INJECTION INTRAMUSCULAR; INTRAVENOUS AS NEEDED
Status: DISCONTINUED | OUTPATIENT
Start: 2024-11-08 | End: 2024-11-08 | Stop reason: HOSPADM

## 2024-11-08 ASSESSMENT — PAIN SCALES - GENERAL: PAINLEVEL_OUTOF10: 0-NO PAIN

## 2024-11-08 NOTE — PROGRESS NOTES
Patient ID: Cecelia Birmingham is a 64 y.o. female.  Referring Physician: Jefferson Morales MD  [address] [facility] [address]  [place] [id number]  Primary Care Provider: Heriberto Hebert MD  Visit Type: Initial Visit  Bone marrow aspiration and biopsy on August 2, 2024 revealed hypercellular bone marrow with involvement by CD5, CD10 negative lymphoproliferative disorder.    Plasma cell population with cytoplasmic kappa excess 5% present.    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    IgM kappa 2.2 g/dL, beta-2 microglobulin 3.7 mcg/dL.  Chromosomal analysis revealed 46XX, +4, +6, add(6)(q12)x2,-8[2]/46XX del(6)(q12q25)[2]/46XX[17]  Retroperitoneal lymphadenopathy.  Started single agent weekly Rituxan on 9/20/24  Subjective    HPI  The patient was referred to me for further evaluation and management of retroperitoneal lymphadenopathy, evaluated on March 14, 2024.  The patient was doing well till October 25, 2023 when she noticed acute onset of sharp mid abdominal pain shortly after awakening from sleep.  It persisted through the day with associated bloating.  The patient worked the whole day but eventually presented to ED in [facility].  Did not have any fevers chills sweats nausea vomiting diarrhea constipation hematemesis melena hematochezia materia.  The patient was discharged on antibiotics after CT scans.  The patient claims that she is felt better bloating is resolved she does have minimal residual abdominal discomfort.  CT scan revealed aortocaval and left periaortic ill-defined soft tissue extending below the aortic bifurcation.  Differential diagnosis thought to be retroperitoneal fibrosis, idiopathic, not aneurysmal form of colonic aortitis or lymphoma.  There was no associated hydronephrosis.  A 2.8 cm mixed hyperattenuating and hypoattenuating mass was noted in the left lobe of the liver.  A tiny 7 mm lesion was noted in the lower pole of the  right kidney.  A follow-up MRI at  revealed simple cyst in the right kidney 5 mm at the lower pole.  Hemangioma noted in the liver corresponding to the CT finding.  The retroperitoneal soft tissue fullness is confirmed and not significantly different than the CT imaging.  The patient has never had abdominal operation.CT-guided biopsy on January 12, 2024 revealed limited tissue with an atypical lymphoid infiltrate.  Flow cytometry on February 6, 2024 revealed CD5 negative, CD10 negative, kappa restricted B-cell population 4% of total events no abnormal T-cell population identified.    At interview on March 14, 2024 the patient narrated entire history.  Denied history of weight loss, fevers, night sweats, chest pain, shortness of breath, nausea, vomiting, hematemesis, melena, hematochezia and hematuria.    The patient had called for follow-up on July 11, 2024 regarding history of abdominal pain and abnormal CT scans the patient complains of frontal headaches.  Was evaluated by Dr. Hebert and noted to have low iron saturation of 10%.  She was placed on ferrous sulfate 325 mg p.o. daily.  Headaches persist.  Also, patient had retroperitoneal lymphadenopathy and biopsies were attempted twice but inconclusive.  A PET scan was ordered.  Patient anxious to know results of the tests performed.    The patient had called for follow-up on August 23, 2024 regarding history of abdominal pain and abnormal CT scans the patient complains of frontal headaches.  Was evaluated by Dr. Hebert and noted to have low iron saturation of 10%.  She was placed on ferrous sulfate 325 mg p.o. daily.  Headaches persist.  Also, patient had retroperitoneal lymphadenopathy and biopsies were attempted twice but inconclusive.  A bone marrow aspiration and biopsy on August 2, 2024 revealed findings consistent with 5% plasma cells kappa restriction, suggestive of MGUS, hypercellular bone marrow with involvement of CD5, CD10 negative lymphoplasmacytic  lymphoma with MYD 88 mutation and IgH mutation as well.  The patient had called for follow-up on September 13 2024 regarding history of abdominal pain and abnormal CT scans the patient complains of frontal headaches.  Was evaluated by Dr. Hebert and noted to have low iron saturation of 10%.  She was placed on ferrous sulfate 325 mg p.o. daily.  Headaches persist.  Also, patient had retroperitoneal lymphadenopathy and biopsies were attempted twice but inconclusive.  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  The patient claims that she can carry on these work but feels fatigued at the end of the day and gives herself a headache.  In addition the patient also has abdominal pain.  [facility] related to elevated protein count/elevated viscosity.    The patient had come  for follow-up on November 8, 2024 regarding history of abdominal pain and abnormal CT scans the patient complains of frontal headaches.  Was evaluated by Dr. Hebert and noted to have low iron saturation of 10%.  She was placed on ferrous sulfate 325 mg p.o. daily.  Headaches persist.  Also, patient had retroperitoneal lymphadenopathy and biopsies were attempted twice but inconclusive.  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  The patient claims that she can carry on  work but feels fatigued at the end of the day and gives herself a headache.  In addition the patient also has abdominal pain.  [facility] not  reveal any abnormality related to elevated protein count/elevated viscosity.  Because of progressive and persistent symptoms the patient was started on weekly Rituxan beginning September 20, 2024.  After 1 treatment the patient claims that she is beginning to feel better Previously noted abdominal pain and headaches are less.  Cycle 4 today M protein down to 1.5 g/dL.    Past medical history:    As above.    Past surgical history:  History of uterine ablation on March 2, 2020    Mammogram:    2 years ago.    Colonoscopy:    4 years ago.        Review of Systems   All other systems reviewed and are negative.       Objective   BSA: 1.51 meters squared  /67   Pulse 73   Temp 36.5 °C (97.7 °F) (Temporal)   Resp 16   Wt 51.5 kg (113 lb 8.6 oz)   SpO2 96%   BMI 20.11 kg/m²      has no past medical history on file.   has a past surgical history that includes Other surgical history (03/02/2020).  Family History   Problem Relation Name Age of Onset    Breast cancer Other Grandparent      Oncology History   Marginal zone B-cell lymphoma (Multi)   9/5/2024 Initial Diagnosis    Marginal zone B-cell lymphoma (Multi)     9/20/2024 - 10/11/2024 Chemotherapy    RiTUXimab (Weekly), 28 Day Cycle      10/24/2024 -  Chemotherapy    RiTUXimab (Weekly), 28 Day Cycle          Cecelia Birmingham  reports that she has never smoked. She has never been exposed to tobacco smoke. She has never used smokeless tobacco.  She  reports current alcohol use of about 2.0 standard drinks of alcohol per week.  She  reports no history of drug use.    Physical Exam  Constitutional:       Appearance: Normal appearance.   HENT:      Head: Normocephalic and atraumatic.      Nose: Nose normal.      Mouth/Throat:      Mouth: Mucous membranes are moist.      Pharynx: Oropharynx is clear.   Eyes:      Extraocular Movements: Extraocular movements intact.      Conjunctiva/sclera: Conjunctivae normal.      Pupils: Pupils are equal, round, and reactive to  light.   Cardiovascular:      Rate and Rhythm: Normal rate and regular rhythm.   Pulmonary:      Effort: Pulmonary effort is normal.      Breath sounds: Normal breath sounds.   Abdominal:      General: Abdomen is flat. Bowel sounds are normal.      Palpations: Abdomen is soft.   Musculoskeletal:         General: Normal range of motion.      Cervical back: Normal range of motion and neck supple.   Neurological:      General: No focal deficit present.      Mental Status: She is alert and oriented to person, place, and time. Mental status is at baseline.   Psychiatric:         Mood and Affect: Mood normal.         Behavior: Behavior normal.         Thought Content: Thought content normal.         Judgment: Judgment normal.         WBC   Date/Time Value Ref Range Status   10/14/2024 08:23 AM 4.6 4.4 - 11.3 x10*3/uL Final   09/13/2024 09:58 AM 6.7 4.4 - 11.3 x10*3/uL Final   08/23/2024 11:41 AM 6.7 4.4 - 11.3 x10*3/uL Final     nRBC   Date Value Ref Range Status   10/14/2024 0.0 0.0 - 0.0 /100 WBCs Final   09/13/2024   Final     Comment:     Not Measured   08/23/2024   Final     Comment:     Not Measured     RBC   Date Value Ref Range Status   10/14/2024 4.00 4.00 - 5.20 x10*6/uL Final   09/13/2024 4.31 4.00 - 5.20 x10*6/uL Final   08/23/2024 4.27 4.00 - 5.20 x10*6/uL Final     Hemoglobin   Date Value Ref Range Status   10/14/2024 11.3 (L) 12.0 - 16.0 g/dL Final   09/13/2024 12.5 12.0 - 16.0 g/dL Final   08/23/2024 12.3 12.0 - 16.0 g/dL Final     Hematocrit   Date Value Ref Range Status   10/14/2024 34.4 (L) 36.0 - 46.0 % Final   09/13/2024 37.7 36.0 - 46.0 % Final   08/23/2024 37.2 36.0 - 46.0 % Final     MCV   Date/Time Value Ref Range Status   10/14/2024 08:23 AM 86 80 - 100 fL Final   09/13/2024 09:58 AM 88 80 - 100 fL Final   08/23/2024 11:41 AM 87 80 - 100 fL Final     MCH   Date/Time Value Ref Range Status   10/14/2024 08:23 AM 28.3 26.0 - 34.0 pg Final   09/13/2024 09:58 AM 29.0 26.0 - 34.0 pg Final   08/23/2024  "11:41 AM 28.8 26.0 - 34.0 pg Final     MCHC   Date/Time Value Ref Range Status   10/14/2024 08:23 AM 32.8 32.0 - 36.0 g/dL Final   09/13/2024 09:58 AM 33.2 32.0 - 36.0 g/dL Final   08/23/2024 11:41 AM 33.1 32.0 - 36.0 g/dL Final     RDW   Date/Time Value Ref Range Status   10/14/2024 08:23 AM 13.9 11.5 - 14.5 % Final   09/13/2024 09:58 AM 14.4 11.5 - 14.5 % Final   08/23/2024 11:41 AM 14.1 11.5 - 14.5 % Final     Platelets   Date/Time Value Ref Range Status   10/14/2024 08:23  (H) 150 - 450 x10*3/uL Final   09/13/2024 09:58  150 - 450 x10*3/uL Final   08/23/2024 11:41  150 - 450 x10*3/uL Final     No results found for: \"MPV\"  Neutrophils %   Date/Time Value Ref Range Status   10/14/2024 08:23 AM 64.1 40.0 - 80.0 % Final   09/13/2024 09:58 AM 71.8 40.0 - 80.0 % Final   08/23/2024 11:41 AM 77.2 40.0 - 80.0 % Final     Immature Granulocytes %, Automated   Date/Time Value Ref Range Status   10/14/2024 08:23 AM 0.2 0.0 - 0.9 % Final     Comment:     Immature Granulocyte Count (IG) includes promyelocytes, myelocytes and metamyelocytes but does not include bands. Percent differential counts (%) should be interpreted in the context of the absolute cell counts (cells/UL).   09/13/2024 09:58 AM 0.1 0.0 - 0.9 % Final     Comment:     Immature Granulocyte Count (IG) includes promyelocytes, myelocytes and metamyelocytes but does not include bands. Percent differential counts (%) should be interpreted in the context of the absolute cell counts (cells/UL).   08/23/2024 11:41 AM 0.1 0.0 - 0.9 % Final     Comment:     Immature Granulocyte Count (IG) includes promyelocytes, myelocytes and metamyelocytes but does not include bands. Percent differential counts (%) should be interpreted in the context of the absolute cell counts (cells/UL).     Lymphocytes %   Date/Time Value Ref Range Status   10/14/2024 08:23 AM 15.8 13.0 - 44.0 % Final   09/13/2024 09:58 AM 14.6 13.0 - 44.0 % Final   08/23/2024 11:41 AM 11.3 13.0 - " 44.0 % Final     Monocytes %   Date/Time Value Ref Range Status   10/14/2024 08:23 AM 17.7 2.0 - 10.0 % Final   09/13/2024 09:58 AM 12.5 2.0 - 10.0 % Final   08/23/2024 11:41 AM 10.7 2.0 - 10.0 % Final     Eosinophils %   Date/Time Value Ref Range Status   10/14/2024 08:23 AM 1.1 0.0 - 6.0 % Final   09/13/2024 09:58 AM 0.7 0.0 - 6.0 % Final   08/23/2024 11:41 AM 0.3 0.0 - 6.0 % Final     Basophils %   Date/Time Value Ref Range Status   10/14/2024 08:23 AM 1.1 0.0 - 2.0 % Final   09/13/2024 09:58 AM 0.3 0.0 - 2.0 % Final   08/23/2024 11:41 AM 0.4 0.0 - 2.0 % Final     Neutrophils Absolute   Date/Time Value Ref Range Status   10/14/2024 08:23 AM 2.97 1.20 - 7.70 x10*3/uL Final     Comment:     Percent differential counts (%) should be interpreted in the context of the absolute cell counts (cells/uL).   09/13/2024 09:58 AM 4.80 1.20 - 7.70 x10*3/uL Final     Comment:     Percent differential counts (%) should be interpreted in the context of the absolute cell counts (cells/uL).   08/23/2024 11:41 AM 5.17 1.20 - 7.70 x10*3/uL Final     Comment:     Percent differential counts (%) should be interpreted in the context of the absolute cell counts (cells/uL).     Immature Granulocytes Absolute, Automated   Date/Time Value Ref Range Status   10/14/2024 08:23 AM 0.01 0.00 - 0.70 x10*3/uL Final   09/13/2024 09:58 AM 0.01 0.00 - 0.70 x10*3/uL Final   08/23/2024 11:41 AM 0.01 0.00 - 0.70 x10*3/uL Final     Lymphocytes Absolute   Date/Time Value Ref Range Status   10/14/2024 08:23 AM 0.73 (L) 1.20 - 4.80 x10*3/uL Final   09/13/2024 09:58 AM 0.98 (L) 1.20 - 4.80 x10*3/uL Final   08/23/2024 11:41 AM 0.76 (L) 1.20 - 4.80 x10*3/uL Final     Monocytes Absolute   Date/Time Value Ref Range Status   10/14/2024 08:23 AM 0.82 0.10 - 1.00 x10*3/uL Final   09/13/2024 09:58 AM 0.84 0.10 - 1.00 x10*3/uL Final   08/23/2024 11:41 AM 0.72 0.10 - 1.00 x10*3/uL Final     Eosinophils Absolute   Date/Time Value Ref Range Status   10/14/2024 08:23 AM  "0.05 0.00 - 0.70 x10*3/uL Final   09/13/2024 09:58 AM 0.05 0.00 - 0.70 x10*3/uL Final   08/23/2024 11:41 AM 0.02 0.00 - 0.70 x10*3/uL Final     Basophils Absolute   Date/Time Value Ref Range Status   10/14/2024 08:23 AM 0.05 0.00 - 0.10 x10*3/uL Final   09/13/2024 09:58 AM 0.02 0.00 - 0.10 x10*3/uL Final   08/23/2024 11:41 AM 0.03 0.00 - 0.10 x10*3/uL Final       No components found for: \"PT\"  No results found for: \"APTT\"    Assessment/Plan      The patient is a 63-year-old woman who presented in October 2023 with abdominal pain.  She was evaluated at [facility] ED in [place] and the CT scan revealed possible retroperitoneal lymphadenopathy initial biopsy was inconclusive repeat biopsy with flow cytometry revealed CD5 negative, CD10 negative kappa restricted lymphocytes, 4% of events.  Would like to obtain more information and if necessary consider bone marrow aspiration and biopsy.  The patient is agreeable for follow-up and return in 4 weeks.    The patient had called for follow-up on March 21, 2024 regarding history of abdominal pain and abnormal CT scan with retroperitoneal lymphadenopathy/thickening.  Physical examination was within normal limits.  I have personally discussed with Dr. Caicedo.  Initially a CT-guided biopsy was performed the specimen was inadequate.  Second biopsy flow cytometry was performed but once again it was inconclusive.  I explained to the patient above findings.  Options include    Repeat CT-guided biopsy again.    Do nothing.    Repeat blood work and CT scan of chest abdomen pelvis in 4 months and consider a biopsy if abnormalities are found.  The patient has requested follow-up in 4 months and CT scan and blood work.  The patient was appreciative of the details provided and grateful.  The patient had called for follow-up on July 11, 2024 regarding history of abdominal pain and abnormal CT scans the patient complains of frontal headaches.  Was evaluated by Dr. Hebert and noted to have low iron " saturation of 10%.  She was placed on ferrous sulfate 325 mg p.o. daily.  Headaches persist.  Also, patient had retroperitoneal lymphadenopathy and biopsies were attempted twice but inconclusive.  A PET scan was ordered.  Patient anxious to know results of the tests performed.  IgM kappa M protein 2.2 g/dL.  Very suspicious for low-grade lymphoma.  Recall that CT scan of abdomen pelvis had revealed retroperitoneal lymphadenopathy.  CT-guided biopsies attempted twice were inconclusive.  PET scan shows mild FDG avidity in bilateral axilla lymph nodes as well as retroperitoneal lymph nodes but no discrete mass.  If agreeable I have recommended a bone marrow aspiration and biopsy.  The patient had called for follow-up on August 23, 2024 regarding history of abdominal pain and abnormal CT scans the patient complains of frontal headaches.  Was evaluated by Dr. Hebert and noted to have low iron saturation of 10%.  She was placed on ferrous sulfate 325 mg p.o. daily.  Headaches persist.  Also, patient had retroperitoneal lymphadenopathy and biopsies were attempted twice but inconclusive.  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  I have recommended serum viscosity, ophthalmology evaluation for retinal exam.  The patient is symptomatic and requesting assistance.  Consider single agent Rituxan SQ every 4 weeks after completion of evaluation.  Patient to return in 4 weeks.    Anemia:    Previous hemoglobin 11.1 g/dL recent hemoglobin in June 2024 had improved up to 11.3 g/dL.  Iron saturation decreased at 10%.  The patient was placed on ferrous sulfate 325 mg p.o. daily by .  I had a detailed discussion with the patient and explained to her that her symptoms do not necessarily  correlate with her hemoglobin levels or low iron saturation.  However, the patient is convinced that her headache is due to low iron level.  However, I have given her a benefit of doubt and recommended increasing ferrous sulfate 325 mg p.o. half an hour after food daily, slowly increase to 3 times a day reassess in 6 weeks.  I also explained to the patient that it generally takes 4 to 6 weeks for hemoglobin to normalize after initiating iron therapy and 3 months to replenish iron stores.  If the patient does correct with iron replacement therapy it would suggest iron deficiency and would recommend GI evaluation.  The patient is agreeable.  The patient had called for follow-up on August 23, 2024 regarding history of abdominal pain and abnormal CT scans the patient complains of frontal headaches.  Was evaluated by Dr. Hebert and noted to have low iron saturation of 10%.  She was placed on ferrous sulfate 325 mg p.o. daily.  Headaches persist.  Also, patient had retroperitoneal lymphadenopathy and biopsies were attempted twice but inconclusive.  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  The patient has various symptoms such as abdominal pain, easy fatigability.  Although, she is able to carry on daily activities but feels very fatigued and gives herself a headache.  She has had headaches for many years.  However, the other symptoms I have given her a benefit of doubt and recommended single agent Rituxan given weekly.  Printed material from [facility] provided.  Informed consent obtained.  Patient to initiate cycle 1 Rituxan on September 20, 2024.  Return in 2 weeks.  The patient had come  for follow-up on November 8 , 2024 regarding history of abdominal pain and abnormal CT scans the  patient complains of frontal headaches.  Was evaluated by Dr. Hebert and noted to have low iron saturation of 10%.  She was placed on ferrous sulfate 325 mg p.o. daily.  Headaches persist.  Also, patient had retroperitoneal lymphadenopathy and biopsies were attempted twice but inconclusive.  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  The patient claims that she can carry on these work but feels fatigued at the end of the day and gives herself a headache.  In addition the patient also has abdominal pain.  Ophthalmology evaluation at [facility] did not reveal any abnormality related to elevated protein count/elevated viscosity.  Because of progressive and persistent symptoms the patient was started on weekly Rituxan beginning September 20, 2024.  After 1 treatment the patient claims that she is beginning to feel better.  Previously noted abdominal pain and headaches are less.  M protein down to 1.5 g/dL.  Cycle 4 of weekly Rituxan today.  PET scan on November 1, 2024 after cycle 4 Rituxan revealed Interval decrease in mild hypermetabolic activity of a  para-aortic soft tissue mass with activity at the level of blood  pool, compatible with post treatment changes. Deauville score 2  2. Interval decrease in mild hypermetabolic activity within bilateral  axillary lymph nodes, which are non-specific  3. No new hypermetabolic navdeep or distant metastasis.  4. Hepatomegaly without focal hypermetabolic disease involvement..  Will continue weekly Rituxan x 4.    Thank you for allowing me to participate in care of your patient if you have any questions please feel free to call me.  Diagnoses and all orders for this visit:  Retroperitoneal lymphadenopathy  -     Referral to Hematology and Oncology          Jefferson Morales MD

## 2024-11-08 NOTE — PROGRESS NOTES
Patient is here today for Rituxan Hycela injection - no complications since last being seen-  Independent double check done prior to injection today-   b/h/ lung sounds not auscultated  Patient tolerated injection well.  Remained asymptomatic for injection and post 15 min observation. No complaints. Call back instructions reviewed.    Patient verbalizes understanding of plan of care.  Ambulated off unit without difficulty, steady gait.

## 2024-11-08 NOTE — PROGRESS NOTES
Rituxan today as ordered.  Scheduled for 4 weekly doses through December.  Follow-up in Mid-December.  Call back instructions reviewed.  Patient verbalized understanding.

## 2024-11-14 ENCOUNTER — APPOINTMENT (OUTPATIENT)
Dept: HEMATOLOGY/ONCOLOGY | Facility: CLINIC | Age: 64
End: 2024-11-14
Payer: COMMERCIAL

## 2024-11-15 ENCOUNTER — INFUSION (OUTPATIENT)
Dept: HEMATOLOGY/ONCOLOGY | Facility: CLINIC | Age: 64
End: 2024-11-15
Payer: COMMERCIAL

## 2024-11-15 ENCOUNTER — LAB (OUTPATIENT)
Dept: LAB | Facility: CLINIC | Age: 64
End: 2024-11-15
Payer: COMMERCIAL

## 2024-11-15 VITALS
WEIGHT: 113.76 LBS | BODY MASS INDEX: 20.93 KG/M2 | HEIGHT: 62 IN | TEMPERATURE: 97.9 F | SYSTOLIC BLOOD PRESSURE: 111 MMHG | HEART RATE: 71 BPM | DIASTOLIC BLOOD PRESSURE: 69 MMHG | OXYGEN SATURATION: 98 % | RESPIRATION RATE: 16 BRPM

## 2024-11-15 DIAGNOSIS — R59.0 RETROPERITONEAL LYMPHADENOPATHY: ICD-10-CM

## 2024-11-15 DIAGNOSIS — R93.5 ABNORMAL CT OF THE ABDOMEN: ICD-10-CM

## 2024-11-15 DIAGNOSIS — C85.80 MARGINAL ZONE B-CELL LYMPHOMA (MULTI): ICD-10-CM

## 2024-11-15 LAB
BASOPHILS # BLD AUTO: 0.03 X10*3/UL (ref 0–0.1)
BASOPHILS NFR BLD AUTO: 0.7 %
EOSINOPHIL # BLD AUTO: 0.07 X10*3/UL (ref 0–0.7)
EOSINOPHIL NFR BLD AUTO: 1.6 %
ERYTHROCYTE [DISTWIDTH] IN BLOOD BY AUTOMATED COUNT: 13.3 % (ref 11.5–14.5)
HCT VFR BLD AUTO: 39.1 % (ref 36–46)
HGB BLD-MCNC: 12.8 G/DL (ref 12–16)
IMM GRANULOCYTES # BLD AUTO: 0 X10*3/UL (ref 0–0.7)
IMM GRANULOCYTES NFR BLD AUTO: 0 % (ref 0–0.9)
LYMPHOCYTES # BLD AUTO: 0.92 X10*3/UL (ref 1.2–4.8)
LYMPHOCYTES NFR BLD AUTO: 20.8 %
MCH RBC QN AUTO: 28.9 PG (ref 26–34)
MCHC RBC AUTO-ENTMCNC: 32.7 G/DL (ref 32–36)
MCV RBC AUTO: 88 FL (ref 80–100)
MONOCYTES # BLD AUTO: 0.66 X10*3/UL (ref 0.1–1)
MONOCYTES NFR BLD AUTO: 14.9 %
NEUTROPHILS # BLD AUTO: 2.74 X10*3/UL (ref 1.2–7.7)
NEUTROPHILS NFR BLD AUTO: 62 %
NRBC BLD-RTO: ABNORMAL /100{WBCS}
PLATELET # BLD AUTO: 372 X10*3/UL (ref 150–450)
RBC # BLD AUTO: 4.43 X10*6/UL (ref 4–5.2)
WBC # BLD AUTO: 4.4 X10*3/UL (ref 4.4–11.3)

## 2024-11-15 PROCEDURE — 82232 ASSAY OF BETA-2 PROTEIN: CPT

## 2024-11-15 PROCEDURE — 84155 ASSAY OF PROTEIN SERUM: CPT

## 2024-11-15 PROCEDURE — 86334 IMMUNOFIX E-PHORESIS SERUM: CPT

## 2024-11-15 PROCEDURE — 2500000001 HC RX 250 WO HCPCS SELF ADMINISTERED DRUGS (ALT 637 FOR MEDICARE OP): Performed by: INTERNAL MEDICINE

## 2024-11-15 PROCEDURE — 84155 ASSAY OF PROTEIN SERUM: CPT | Mod: 59

## 2024-11-15 PROCEDURE — 85025 COMPLETE CBC W/AUTO DIFF WBC: CPT

## 2024-11-15 PROCEDURE — 84550 ASSAY OF BLOOD/URIC ACID: CPT

## 2024-11-15 PROCEDURE — 83615 LACTATE (LD) (LDH) ENZYME: CPT

## 2024-11-15 PROCEDURE — 96401 CHEMO ANTI-NEOPL SQ/IM: CPT

## 2024-11-15 PROCEDURE — 83521 IG LIGHT CHAINS FREE EACH: CPT

## 2024-11-15 PROCEDURE — 36415 COLL VENOUS BLD VENIPUNCTURE: CPT

## 2024-11-15 PROCEDURE — 2500000004 HC RX 250 GENERAL PHARMACY W/ HCPCS (ALT 636 FOR OP/ED): Mod: JZ | Performed by: INTERNAL MEDICINE

## 2024-11-15 RX ORDER — PROCHLORPERAZINE MALEATE 10 MG
10 TABLET ORAL EVERY 6 HOURS PRN
Status: DISCONTINUED | OUTPATIENT
Start: 2024-11-15 | End: 2024-11-15 | Stop reason: HOSPADM

## 2024-11-15 RX ORDER — PROCHLORPERAZINE EDISYLATE 5 MG/ML
10 INJECTION INTRAMUSCULAR; INTRAVENOUS EVERY 6 HOURS PRN
Status: DISCONTINUED | OUTPATIENT
Start: 2024-11-15 | End: 2024-11-15 | Stop reason: HOSPADM

## 2024-11-15 RX ORDER — DIPHENHYDRAMINE HYDROCHLORIDE 50 MG/ML
50 INJECTION INTRAMUSCULAR; INTRAVENOUS AS NEEDED
Status: DISCONTINUED | OUTPATIENT
Start: 2024-11-15 | End: 2024-11-15 | Stop reason: HOSPADM

## 2024-11-15 RX ORDER — ALBUTEROL SULFATE 0.83 MG/ML
3 SOLUTION RESPIRATORY (INHALATION) AS NEEDED
Status: DISCONTINUED | OUTPATIENT
Start: 2024-11-15 | End: 2024-11-15 | Stop reason: HOSPADM

## 2024-11-15 RX ORDER — ACETAMINOPHEN 325 MG/1
650 TABLET ORAL ONCE
Status: COMPLETED | OUTPATIENT
Start: 2024-11-15 | End: 2024-11-15

## 2024-11-15 RX ORDER — FAMOTIDINE 10 MG/ML
20 INJECTION INTRAVENOUS ONCE AS NEEDED
Status: DISCONTINUED | OUTPATIENT
Start: 2024-11-15 | End: 2024-11-15 | Stop reason: HOSPADM

## 2024-11-15 RX ORDER — EPINEPHRINE 0.3 MG/.3ML
0.3 INJECTION SUBCUTANEOUS EVERY 5 MIN PRN
Status: DISCONTINUED | OUTPATIENT
Start: 2024-11-15 | End: 2024-11-15 | Stop reason: HOSPADM

## 2024-11-15 RX ORDER — DIPHENHYDRAMINE HCL 50 MG
50 CAPSULE ORAL ONCE
Status: COMPLETED | OUTPATIENT
Start: 2024-11-15 | End: 2024-11-15

## 2024-11-15 ASSESSMENT — PAIN SCALES - GENERAL: PAINLEVEL_OUTOF10: 0-NO PAIN

## 2024-11-15 NOTE — PROGRESS NOTES
Pt here for rituximab injection, tolerated well. Observed for 15 minutes post injection, discharged in stable condition, no further needs at this time. Has schedule for follow up.

## 2024-11-16 LAB
ALBUMIN SERPL BCP-MCNC: 4 G/DL (ref 3.4–5)
ALP SERPL-CCNC: 60 U/L (ref 33–136)
ALT SERPL W P-5'-P-CCNC: 11 U/L (ref 7–45)
ANION GAP SERPL CALC-SCNC: 15 MMOL/L (ref 10–20)
AST SERPL W P-5'-P-CCNC: 12 U/L (ref 9–39)
B2 MICROGLOB SERPL-MCNC: 2.9 MG/L (ref 0.7–2.2)
BILIRUB SERPL-MCNC: 0.5 MG/DL (ref 0–1.2)
BUN SERPL-MCNC: 18 MG/DL (ref 6–23)
CALCIUM SERPL-MCNC: 9.5 MG/DL (ref 8.6–10.6)
CHLORIDE SERPL-SCNC: 100 MMOL/L (ref 98–107)
CO2 SERPL-SCNC: 28 MMOL/L (ref 21–32)
CREAT SERPL-MCNC: 0.67 MG/DL (ref 0.5–1.05)
EGFRCR SERPLBLD CKD-EPI 2021: >90 ML/MIN/1.73M*2
GLUCOSE SERPL-MCNC: 89 MG/DL (ref 74–99)
LDH SERPL L TO P-CCNC: 110 U/L (ref 84–246)
POTASSIUM SERPL-SCNC: 4.7 MMOL/L (ref 3.5–5.3)
PROT SERPL-MCNC: 7.9 G/DL (ref 6.4–8.2)
PROT SERPL-MCNC: 7.9 G/DL (ref 6.4–8.2)
SODIUM SERPL-SCNC: 138 MMOL/L (ref 136–145)
URATE SERPL-MCNC: 4.3 MG/DL (ref 2.3–6.7)

## 2024-11-17 LAB
KAPPA LC SERPL-MCNC: 2.84 MG/DL (ref 0.33–1.94)
KAPPA LC/LAMBDA SER: 8.88 {RATIO} (ref 0.26–1.65)
LAMBDA LC SERPL-MCNC: 0.32 MG/DL (ref 0.57–2.63)

## 2024-11-18 ENCOUNTER — TELEPHONE (OUTPATIENT)
Dept: HEMATOLOGY/ONCOLOGY | Facility: CLINIC | Age: 64
End: 2024-11-18
Payer: COMMERCIAL

## 2024-11-18 NOTE — TELEPHONE ENCOUNTER
Patient called and states she has a work conflict on 11/29/24 and would like to come 11/27/24 2:30 or 3:00.    Please advise.

## 2024-11-18 NOTE — TELEPHONE ENCOUNTER
Call returned to patient after receiving suggested new appt time from infusion charge nurse.    Informed patient that since Rituxan is a weekly treatment it wouldn't be appropriate to bring her in 2 days early in the 7 day cycle.  Offered 12/2/24 @ 9:30 or 1:30.  Will send pt a [employer] message for her to respond to which appt she would like to confirm.

## 2024-11-19 LAB
ALBUMIN: 4.2 G/DL (ref 3.4–5)
ALPHA 1 GLOBULIN: 0.3 G/DL (ref 0.2–0.6)
ALPHA 2 GLOBULIN: 0.5 G/DL (ref 0.4–1.1)
BETA GLOBULIN: 2.6 G/DL (ref 0.5–1.2)
GAMMA GLOBULIN: 0.3 G/DL (ref 0.5–1.4)
IMMUNOFIXATION COMMENT: ABNORMAL
M-PROTEIN 1: 1.8 G/DL
M-PROTEIN 2: 0 G/DL
PATH REVIEW - SERUM IMMUNOFIXATION: ABNORMAL
PATH REVIEW-SERUM PROTEIN ELECTROPHORESIS: ABNORMAL
PROTEIN ELECTROPHORESIS COMMENT: ABNORMAL

## 2024-11-22 ENCOUNTER — INFUSION (OUTPATIENT)
Dept: HEMATOLOGY/ONCOLOGY | Facility: CLINIC | Age: 64
End: 2024-11-22
Payer: COMMERCIAL

## 2024-11-22 VITALS
WEIGHT: 113.21 LBS | OXYGEN SATURATION: 96 % | HEIGHT: 62 IN | TEMPERATURE: 97.5 F | SYSTOLIC BLOOD PRESSURE: 106 MMHG | DIASTOLIC BLOOD PRESSURE: 67 MMHG | BODY MASS INDEX: 20.83 KG/M2 | HEART RATE: 62 BPM | RESPIRATION RATE: 16 BRPM

## 2024-11-22 DIAGNOSIS — C85.80 MARGINAL ZONE B-CELL LYMPHOMA (MULTI): ICD-10-CM

## 2024-11-22 PROCEDURE — 2500000001 HC RX 250 WO HCPCS SELF ADMINISTERED DRUGS (ALT 637 FOR MEDICARE OP): Performed by: INTERNAL MEDICINE

## 2024-11-22 PROCEDURE — 2500000004 HC RX 250 GENERAL PHARMACY W/ HCPCS (ALT 636 FOR OP/ED): Mod: JZ | Performed by: INTERNAL MEDICINE

## 2024-11-22 PROCEDURE — 96401 CHEMO ANTI-NEOPL SQ/IM: CPT

## 2024-11-22 RX ORDER — ACETAMINOPHEN 325 MG/1
650 TABLET ORAL ONCE
Status: COMPLETED | OUTPATIENT
Start: 2024-11-22 | End: 2024-11-22

## 2024-11-22 RX ORDER — DIPHENHYDRAMINE HYDROCHLORIDE 50 MG/ML
50 INJECTION INTRAMUSCULAR; INTRAVENOUS AS NEEDED
Status: DISCONTINUED | OUTPATIENT
Start: 2024-11-22 | End: 2024-11-22 | Stop reason: HOSPADM

## 2024-11-22 RX ORDER — ALBUTEROL SULFATE 0.83 MG/ML
3 SOLUTION RESPIRATORY (INHALATION) AS NEEDED
Status: DISCONTINUED | OUTPATIENT
Start: 2024-11-22 | End: 2024-11-22 | Stop reason: HOSPADM

## 2024-11-22 RX ORDER — EPINEPHRINE 0.3 MG/.3ML
0.3 INJECTION SUBCUTANEOUS EVERY 5 MIN PRN
Status: DISCONTINUED | OUTPATIENT
Start: 2024-11-22 | End: 2024-11-22 | Stop reason: HOSPADM

## 2024-11-22 RX ORDER — DIPHENHYDRAMINE HCL 50 MG
50 CAPSULE ORAL ONCE
Status: COMPLETED | OUTPATIENT
Start: 2024-11-22 | End: 2024-11-22

## 2024-11-22 RX ORDER — PROCHLORPERAZINE MALEATE 10 MG
10 TABLET ORAL EVERY 6 HOURS PRN
Status: DISCONTINUED | OUTPATIENT
Start: 2024-11-22 | End: 2024-11-22 | Stop reason: HOSPADM

## 2024-11-22 RX ORDER — PROCHLORPERAZINE EDISYLATE 5 MG/ML
10 INJECTION INTRAMUSCULAR; INTRAVENOUS EVERY 6 HOURS PRN
Status: DISCONTINUED | OUTPATIENT
Start: 2024-11-22 | End: 2024-11-22 | Stop reason: HOSPADM

## 2024-11-22 RX ORDER — FAMOTIDINE 10 MG/ML
20 INJECTION INTRAVENOUS ONCE AS NEEDED
Status: DISCONTINUED | OUTPATIENT
Start: 2024-11-22 | End: 2024-11-22 | Stop reason: HOSPADM

## 2024-11-22 ASSESSMENT — PAIN SCALES - GENERAL: PAINLEVEL_OUTOF10: 0-NO PAIN

## 2024-11-22 NOTE — PROGRESS NOTES
Patient is here today for infusion - no complication since last being seen-  independant double check done prior to chemotherapy today-   b/h/ lung sounds not auscultated  patient verbalizes understanding of plan of care     No complication with rituxan today subcutaneous   
Suzy

## 2024-11-29 ENCOUNTER — APPOINTMENT (OUTPATIENT)
Dept: HEMATOLOGY/ONCOLOGY | Facility: CLINIC | Age: 64
End: 2024-11-29
Payer: COMMERCIAL

## 2024-11-29 ENCOUNTER — INFUSION (OUTPATIENT)
Dept: HEMATOLOGY/ONCOLOGY | Facility: CLINIC | Age: 64
End: 2024-11-29
Payer: COMMERCIAL

## 2024-11-29 VITALS
HEIGHT: 62 IN | RESPIRATION RATE: 16 BRPM | OXYGEN SATURATION: 97 % | DIASTOLIC BLOOD PRESSURE: 71 MMHG | TEMPERATURE: 97.7 F | WEIGHT: 113.76 LBS | SYSTOLIC BLOOD PRESSURE: 124 MMHG | HEART RATE: 74 BPM | BODY MASS INDEX: 20.93 KG/M2

## 2024-11-29 DIAGNOSIS — C85.80 MARGINAL ZONE B-CELL LYMPHOMA (MULTI): ICD-10-CM

## 2024-11-29 PROCEDURE — 96401 CHEMO ANTI-NEOPL SQ/IM: CPT

## 2024-11-29 PROCEDURE — 2500000004 HC RX 250 GENERAL PHARMACY W/ HCPCS (ALT 636 FOR OP/ED): Mod: JZ | Performed by: INTERNAL MEDICINE

## 2024-11-29 PROCEDURE — 2500000001 HC RX 250 WO HCPCS SELF ADMINISTERED DRUGS (ALT 637 FOR MEDICARE OP): Performed by: INTERNAL MEDICINE

## 2024-11-29 RX ORDER — DIPHENHYDRAMINE HYDROCHLORIDE 50 MG/ML
50 INJECTION INTRAMUSCULAR; INTRAVENOUS AS NEEDED
Status: DISCONTINUED | OUTPATIENT
Start: 2024-11-29 | End: 2024-11-29 | Stop reason: HOSPADM

## 2024-11-29 RX ORDER — ACETAMINOPHEN 325 MG/1
650 TABLET ORAL ONCE
Status: COMPLETED | OUTPATIENT
Start: 2024-11-29 | End: 2024-11-29

## 2024-11-29 RX ORDER — DIPHENHYDRAMINE HCL 50 MG
50 CAPSULE ORAL ONCE
Status: COMPLETED | OUTPATIENT
Start: 2024-11-29 | End: 2024-11-29

## 2024-11-29 RX ORDER — FAMOTIDINE 10 MG/ML
20 INJECTION INTRAVENOUS ONCE AS NEEDED
Status: DISCONTINUED | OUTPATIENT
Start: 2024-11-29 | End: 2024-11-29 | Stop reason: HOSPADM

## 2024-11-29 RX ORDER — ALBUTEROL SULFATE 0.83 MG/ML
3 SOLUTION RESPIRATORY (INHALATION) AS NEEDED
Status: DISCONTINUED | OUTPATIENT
Start: 2024-11-29 | End: 2024-11-29 | Stop reason: HOSPADM

## 2024-11-29 RX ORDER — EPINEPHRINE 0.3 MG/.3ML
0.3 INJECTION SUBCUTANEOUS EVERY 5 MIN PRN
Status: DISCONTINUED | OUTPATIENT
Start: 2024-11-29 | End: 2024-11-29 | Stop reason: HOSPADM

## 2024-11-29 RX ORDER — PROCHLORPERAZINE EDISYLATE 5 MG/ML
10 INJECTION INTRAMUSCULAR; INTRAVENOUS EVERY 6 HOURS PRN
Status: DISCONTINUED | OUTPATIENT
Start: 2024-11-29 | End: 2024-11-29 | Stop reason: HOSPADM

## 2024-11-29 RX ORDER — PROCHLORPERAZINE MALEATE 10 MG
10 TABLET ORAL EVERY 6 HOURS PRN
Status: DISCONTINUED | OUTPATIENT
Start: 2024-11-29 | End: 2024-11-29 | Stop reason: HOSPADM

## 2024-11-29 ASSESSMENT — PAIN SCALES - GENERAL: PAINLEVEL_OUTOF10: 0-NO PAIN

## 2024-12-20 ENCOUNTER — OFFICE VISIT (OUTPATIENT)
Dept: HEMATOLOGY/ONCOLOGY | Facility: CLINIC | Age: 64
End: 2024-12-20
Payer: COMMERCIAL

## 2024-12-20 ENCOUNTER — EDUCATION (OUTPATIENT)
Dept: HEMATOLOGY/ONCOLOGY | Facility: CLINIC | Age: 64
End: 2024-12-20

## 2024-12-20 ENCOUNTER — LAB (OUTPATIENT)
Dept: LAB | Facility: CLINIC | Age: 64
End: 2024-12-20
Payer: COMMERCIAL

## 2024-12-20 VITALS
OXYGEN SATURATION: 99 % | HEART RATE: 70 BPM | WEIGHT: 113.1 LBS | DIASTOLIC BLOOD PRESSURE: 57 MMHG | SYSTOLIC BLOOD PRESSURE: 105 MMHG | RESPIRATION RATE: 16 BRPM | TEMPERATURE: 97.7 F | BODY MASS INDEX: 20.47 KG/M2

## 2024-12-20 DIAGNOSIS — C85.80 MARGINAL ZONE B-CELL LYMPHOMA (MULTI): ICD-10-CM

## 2024-12-20 DIAGNOSIS — R59.0 RETROPERITONEAL LYMPHADENOPATHY: ICD-10-CM

## 2024-12-20 DIAGNOSIS — R93.5 ABNORMAL CT OF THE ABDOMEN: ICD-10-CM

## 2024-12-20 LAB
BASOPHILS # BLD AUTO: 0.03 X10*3/UL (ref 0–0.1)
BASOPHILS NFR BLD AUTO: 0.5 %
EOSINOPHIL # BLD AUTO: 0.09 X10*3/UL (ref 0–0.7)
EOSINOPHIL NFR BLD AUTO: 1.5 %
ERYTHROCYTE [DISTWIDTH] IN BLOOD BY AUTOMATED COUNT: 13.6 % (ref 11.5–14.5)
HCT VFR BLD AUTO: 40.4 % (ref 36–46)
HGB BLD-MCNC: 13.5 G/DL (ref 12–16)
IMM GRANULOCYTES # BLD AUTO: 0 X10*3/UL (ref 0–0.7)
IMM GRANULOCYTES NFR BLD AUTO: 0 % (ref 0–0.9)
LYMPHOCYTES # BLD AUTO: 1.29 X10*3/UL (ref 1.2–4.8)
LYMPHOCYTES NFR BLD AUTO: 22.2 %
MCH RBC QN AUTO: 29 PG (ref 26–34)
MCHC RBC AUTO-ENTMCNC: 33.4 G/DL (ref 32–36)
MCV RBC AUTO: 87 FL (ref 80–100)
MONOCYTES # BLD AUTO: 0.77 X10*3/UL (ref 0.1–1)
MONOCYTES NFR BLD AUTO: 13.2 %
NEUTROPHILS # BLD AUTO: 3.64 X10*3/UL (ref 1.2–7.7)
NEUTROPHILS NFR BLD AUTO: 62.6 %
NRBC BLD-RTO: NORMAL /100{WBCS}
PLATELET # BLD AUTO: 365 X10*3/UL (ref 150–450)
RBC # BLD AUTO: 4.66 X10*6/UL (ref 4–5.2)
WBC # BLD AUTO: 5.8 X10*3/UL (ref 4.4–11.3)

## 2024-12-20 PROCEDURE — 82728 ASSAY OF FERRITIN: CPT

## 2024-12-20 PROCEDURE — 83010 ASSAY OF HAPTOGLOBIN QUANT: CPT

## 2024-12-20 PROCEDURE — 82607 VITAMIN B-12: CPT

## 2024-12-20 PROCEDURE — 99215 OFFICE O/P EST HI 40 MIN: CPT | Performed by: INTERNAL MEDICINE

## 2024-12-20 PROCEDURE — 36415 COLL VENOUS BLD VENIPUNCTURE: CPT

## 2024-12-20 PROCEDURE — 80053 COMPREHEN METABOLIC PANEL: CPT

## 2024-12-20 PROCEDURE — 84165 PROTEIN E-PHORESIS SERUM: CPT

## 2024-12-20 PROCEDURE — 85810 BLOOD VISCOSITY EXAMINATION: CPT

## 2024-12-20 PROCEDURE — 84155 ASSAY OF PROTEIN SERUM: CPT | Mod: 59

## 2024-12-20 PROCEDURE — 83540 ASSAY OF IRON: CPT

## 2024-12-20 PROCEDURE — 83521 IG LIGHT CHAINS FREE EACH: CPT

## 2024-12-20 PROCEDURE — 85025 COMPLETE CBC W/AUTO DIFF WBC: CPT

## 2024-12-20 PROCEDURE — 83615 LACTATE (LD) (LDH) ENZYME: CPT

## 2024-12-20 PROCEDURE — 82232 ASSAY OF BETA-2 PROTEIN: CPT

## 2024-12-20 PROCEDURE — 82746 ASSAY OF FOLIC ACID SERUM: CPT

## 2024-12-20 ASSESSMENT — PAIN SCALES - GENERAL: PAINLEVEL_OUTOF10: 0-NO PAIN

## 2024-12-20 NOTE — PROGRESS NOTES
"Patient seen by Dr. Juan today in clinic. Reinforcement education provided regarding next steps with plan of care.      PER DR. 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  The patient claims that after completion of treatment feeling much better.  Headaches and abdominal discomfort have resolved overall feeling much better.  Hemoglobin has normalized to 12.9 g/dL.  Recommend serum viscosity today.  Consider restaging with CT scan of chest abdomen pelvis and eventually a bone marrow aspiration biopsy to confirm response.\"    Pt escorted to lab today.  FUV in 6 weeks per order Dr. Juan.  Patient verbalizes understanding of plan of care via teachback method.                "

## 2024-12-20 NOTE — PROGRESS NOTES
Patient ID: Cecelia Birmingham is a 64 y.o. female.  Referring Physician: Jefferson Morales MD  [address] [facility], [place] [id number]  Primary Care Provider: Heriberto Hebert MD  Visit Type: Initial Visit  Bone marrow aspiration and biopsy on August 2, 2024 revealed hypercellular bone marrow with involvement by CD5, CD10 negative lymphoproliferative disorder.    Plasma cell population with cytoplasmic kappa excess 5% present.    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    IgM kappa 2.2 g/dL, beta-2 microglobulin 3.7 mcg/dL.  Chromosomal analysis revealed 46XX, +4, +6, add(6)(q12)x2,-8[2]/46XX del(6)(q12q25)[2]/46XX[17]  Retroperitoneal lymphadenopathy.  Started single agent weekly Rituxan on 9/20/24 received total of 8 cycles.  Subjective    HPI  The patient was referred to me for further evaluation and management of retroperitoneal lymphadenopathy, evaluated on March 14, 2024.  The patient was doing well till October 25, 2023 when she noticed acute onset of sharp mid abdominal pain shortly after awakening from sleep.  It persisted through the day with associated bloating.  The patient worked the whole day but eventually presented to ED in [facility].  Did not have any fevers chills sweats nausea vomiting diarrhea constipation hematemesis melena hematochezia materia.  The patient was discharged on antibiotics after CT scans.  The patient claims that she is felt better bloating is resolved she does have minimal residual abdominal discomfort.  CT scan revealed aortocaval and left periaortic ill-defined soft tissue extending below the aortic bifurcation.  Differential diagnosis thought to be retroperitoneal fibrosis, idiopathic, not aneurysmal form of colonic aortitis or lymphoma.  There was no associated hydronephrosis.  A 2.8 cm mixed hyperattenuating and hypoattenuating mass was noted in the left lobe of the liver.  A tiny 7 mm lesion was  noted in the lower pole of the right kidney.  A follow-up MRI at  revealed simple cyst in the right kidney 5 mm at the lower pole.  Hemangioma noted in the liver corresponding to the CT finding.  The retroperitoneal soft tissue fullness is confirmed and not significantly different than the CT imaging.  The patient has never had abdominal operation.CT-guided biopsy on January 12, 2024 revealed limited tissue with an atypical lymphoid infiltrate.  Flow cytometry on February 6, 2024 revealed CD5 negative, CD10 negative, kappa restricted B-cell population 4% of total events no abnormal T-cell population identified.    At interview on March 14, 2024 the patient narrated entire history.  Denied history of weight loss, fevers, night sweats, chest pain, shortness of breath, nausea, vomiting, hematemesis, melena, hematochezia and hematuria.    The patient had called for follow-up on July 11, 2024 regarding history of abdominal pain and abnormal CT scans the patient complains of frontal headaches.  Was evaluated by Dr. Hebert and noted to have low iron saturation of 10%.  She was placed on ferrous sulfate 325 mg p.o. daily.  Headaches persist.  Also, patient had retroperitoneal lymphadenopathy and biopsies were attempted twice but inconclusive.  A PET scan was ordered.  Patient anxious to know results of the tests performed.    The patient had called for follow-up on August 23, 2024 regarding history of abdominal pain and abnormal CT scans the patient complains of frontal headaches.  Was evaluated by Dr. Hebert and noted to have low iron saturation of 10%.  She was placed on ferrous sulfate 325 mg p.o. daily.  Headaches persist.  Also, patient had retroperitoneal lymphadenopathy and biopsies were attempted twice but inconclusive.  A bone marrow aspiration and biopsy on August 2, 2024 revealed findings consistent with 5% plasma cells kappa restriction, suggestive of MGUS, hypercellular bone marrow with involvement of CD5, CD10  negative lymphoplasmacytic lymphoma with MYD 88 mutation and IgH mutation as well.  The patient had called for follow-up on September 13 2024 regarding history of abdominal pain and abnormal CT scans the patient complains of frontal headaches.  Was evaluated by Dr. Hebert and noted to have low iron saturation of 10%.  She was placed on ferrous sulfate 325 mg p.o. daily.  Headaches persist.  Also, patient had retroperitoneal lymphadenopathy and biopsies were attempted twice but inconclusive.  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  The patient claims that she can carry on these work but feels fatigued at the end of the day and gives herself a headache.  In addition the patient also has abdominal pain.  Ophthalmology evaluation at [facility] did not reveal any abnormality related to elevated protein count/elevated viscosity.    The patient had come  for follow-up on November 8, 2024 regarding history of abdominal pain and abnormal CT scans the patient complains of frontal headaches.  Was evaluated by Dr. Hebert and noted to have low iron saturation of 10%.  She was placed on ferrous sulfate 325 mg p.o. daily.  Headaches persist.  Also, patient had retroperitoneal lymphadenopathy and biopsies were attempted twice but inconclusive.  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  The patient claims that she can carry on  work but feels fatigued at the end of the day and gives herself a headache.  In addition the patient also has abdominal pain.  Ophthalmology  evaluation at [facility] did not reveal any abnormality related to elevated protein count/elevated viscosity.  Because of progressive and persistent symptoms the patient was started on weekly Rituxan beginning September 20, 2024.  After 1 treatment the patient claims that she is beginning to feel better Previously noted abdominal pain and headaches are less.  Cycle 4 today M protein down to 1.5 g/dL.    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  The patient claims that after completion of treatment feeling much better.  Headaches and abdominal discomfort have resolved overall feeling much better.    Past medical history:    As above.    Past surgical history:  History of uterine ablation on March 2, 2020    Mammogram:    2 years ago.    Colonoscopy:    4 years ago.        Review of Systems   All other systems reviewed and are negative.       Objective   BSA: 1.5 meters squared  /57   Pulse 70   Temp 36.5 °C (97.7 °F) (Temporal)   Resp 16   Wt 51.3 kg (113 lb 1.5 oz)   SpO2 99%   BMI 20.47 kg/m²      has no past medical history on file.   has a past surgical history that includes Other surgical history (03/02/2020).  Family History   Problem Relation Name Age of Onset    Breast cancer Other Grandparent      Oncology History   Marginal zone B-cell lymphoma (Multi)   9/5/2024 Initial Diagnosis    Marginal zone B-cell lymphoma (Multi)     9/20/2024 - 10/11/2024 Chemotherapy    RiTUXimab (Weekly), 28 Day Cycle      11/8/2024 -  Chemotherapy    RiTUXimab (Weekly), 28 Day Cycle          Cecelia Birmingham  reports that she has never smoked. She has never been exposed to tobacco smoke. She has never used smokeless tobacco.  She  reports current alcohol use of about 2.0 standard drinks of alcohol per week.  She  reports no history of drug use.    Physical  Exam  Constitutional:       Appearance: Normal appearance.   HENT:      Head: Normocephalic and atraumatic.      Nose: Nose normal.      Mouth/Throat:      Mouth: Mucous membranes are moist.      Pharynx: Oropharynx is clear.   Eyes:      Extraocular Movements: Extraocular movements intact.      Conjunctiva/sclera: Conjunctivae normal.      Pupils: Pupils are equal, round, and reactive to light.   Cardiovascular:      Rate and Rhythm: Normal rate and regular rhythm.   Pulmonary:      Effort: Pulmonary effort is normal.      Breath sounds: Normal breath sounds.   Abdominal:      General: Abdomen is flat. Bowel sounds are normal.      Palpations: Abdomen is soft.   Musculoskeletal:         General: Normal range of motion.      Cervical back: Normal range of motion and neck supple.   Neurological:      General: No focal deficit present.      Mental Status: She is alert and oriented to person, place, and time. Mental status is at baseline.   Psychiatric:         Mood and Affect: Mood normal.         Behavior: Behavior normal.         Thought Content: Thought content normal.         Judgment: Judgment normal.         WBC   Date/Time Value Ref Range Status   11/15/2024 10:07 AM 4.4 4.4 - 11.3 x10*3/uL Final   10/14/2024 08:23 AM 4.6 4.4 - 11.3 x10*3/uL Final   09/13/2024 09:58 AM 6.7 4.4 - 11.3 x10*3/uL Final     nRBC   Date Value Ref Range Status   11/15/2024   Final     Comment:     Not Measured   10/14/2024 0.0 0.0 - 0.0 /100 WBCs Final   09/13/2024   Final     Comment:     Not Measured     RBC   Date Value Ref Range Status   11/15/2024 4.43 4.00 - 5.20 x10*6/uL Final   10/14/2024 4.00 4.00 - 5.20 x10*6/uL Final   09/13/2024 4.31 4.00 - 5.20 x10*6/uL Final     Hemoglobin   Date Value Ref Range Status   11/15/2024 12.8 12.0 - 16.0 g/dL Final   10/14/2024 11.3 (L) 12.0 - 16.0 g/dL Final   09/13/2024 12.5 12.0 - 16.0 g/dL Final     Hematocrit   Date Value Ref Range Status   11/15/2024 39.1 36.0 - 46.0 % Final   10/14/2024  "34.4 (L) 36.0 - 46.0 % Final   09/13/2024 37.7 36.0 - 46.0 % Final     MCV   Date/Time Value Ref Range Status   11/15/2024 10:07 AM 88 80 - 100 fL Final   10/14/2024 08:23 AM 86 80 - 100 fL Final   09/13/2024 09:58 AM 88 80 - 100 fL Final     MCH   Date/Time Value Ref Range Status   11/15/2024 10:07 AM 28.9 26.0 - 34.0 pg Final   10/14/2024 08:23 AM 28.3 26.0 - 34.0 pg Final   09/13/2024 09:58 AM 29.0 26.0 - 34.0 pg Final     MCHC   Date/Time Value Ref Range Status   11/15/2024 10:07 AM 32.7 32.0 - 36.0 g/dL Final   10/14/2024 08:23 AM 32.8 32.0 - 36.0 g/dL Final   09/13/2024 09:58 AM 33.2 32.0 - 36.0 g/dL Final     RDW   Date/Time Value Ref Range Status   11/15/2024 10:07 AM 13.3 11.5 - 14.5 % Final   10/14/2024 08:23 AM 13.9 11.5 - 14.5 % Final   09/13/2024 09:58 AM 14.4 11.5 - 14.5 % Final     Platelets   Date/Time Value Ref Range Status   11/15/2024 10:07  150 - 450 x10*3/uL Final   10/14/2024 08:23  (H) 150 - 450 x10*3/uL Final   09/13/2024 09:58  150 - 450 x10*3/uL Final     No results found for: \"MPV\"  Neutrophils %   Date/Time Value Ref Range Status   11/15/2024 10:07 AM 62.0 40.0 - 80.0 % Final   10/14/2024 08:23 AM 64.1 40.0 - 80.0 % Final   09/13/2024 09:58 AM 71.8 40.0 - 80.0 % Final     Immature Granulocytes %, Automated   Date/Time Value Ref Range Status   11/15/2024 10:07 AM 0.0 0.0 - 0.9 % Final     Comment:     Immature Granulocyte Count (IG) includes promyelocytes, myelocytes and metamyelocytes but does not include bands. Percent differential counts (%) should be interpreted in the context of the absolute cell counts (cells/UL).   10/14/2024 08:23 AM 0.2 0.0 - 0.9 % Final     Comment:     Immature Granulocyte Count (IG) includes promyelocytes, myelocytes and metamyelocytes but does not include bands. Percent differential counts (%) should be interpreted in the context of the absolute cell counts (cells/UL).   09/13/2024 09:58 AM 0.1 0.0 - 0.9 % Final     Comment:     Immature " Granulocyte Count (IG) includes promyelocytes, myelocytes and metamyelocytes but does not include bands. Percent differential counts (%) should be interpreted in the context of the absolute cell counts (cells/UL).     Lymphocytes %   Date/Time Value Ref Range Status   11/15/2024 10:07 AM 20.8 13.0 - 44.0 % Final   10/14/2024 08:23 AM 15.8 13.0 - 44.0 % Final   09/13/2024 09:58 AM 14.6 13.0 - 44.0 % Final     Monocytes %   Date/Time Value Ref Range Status   11/15/2024 10:07 AM 14.9 2.0 - 10.0 % Final   10/14/2024 08:23 AM 17.7 2.0 - 10.0 % Final   09/13/2024 09:58 AM 12.5 2.0 - 10.0 % Final     Eosinophils %   Date/Time Value Ref Range Status   11/15/2024 10:07 AM 1.6 0.0 - 6.0 % Final   10/14/2024 08:23 AM 1.1 0.0 - 6.0 % Final   09/13/2024 09:58 AM 0.7 0.0 - 6.0 % Final     Basophils %   Date/Time Value Ref Range Status   11/15/2024 10:07 AM 0.7 0.0 - 2.0 % Final   10/14/2024 08:23 AM 1.1 0.0 - 2.0 % Final   09/13/2024 09:58 AM 0.3 0.0 - 2.0 % Final     Neutrophils Absolute   Date/Time Value Ref Range Status   11/15/2024 10:07 AM 2.74 1.20 - 7.70 x10*3/uL Final     Comment:     Percent differential counts (%) should be interpreted in the context of the absolute cell counts (cells/uL).   10/14/2024 08:23 AM 2.97 1.20 - 7.70 x10*3/uL Final     Comment:     Percent differential counts (%) should be interpreted in the context of the absolute cell counts (cells/uL).   09/13/2024 09:58 AM 4.80 1.20 - 7.70 x10*3/uL Final     Comment:     Percent differential counts (%) should be interpreted in the context of the absolute cell counts (cells/uL).     Immature Granulocytes Absolute, Automated   Date/Time Value Ref Range Status   11/15/2024 10:07 AM 0.00 0.00 - 0.70 x10*3/uL Final   10/14/2024 08:23 AM 0.01 0.00 - 0.70 x10*3/uL Final   09/13/2024 09:58 AM 0.01 0.00 - 0.70 x10*3/uL Final     Lymphocytes Absolute   Date/Time Value Ref Range Status   11/15/2024 10:07 AM 0.92 (L) 1.20 - 4.80 x10*3/uL Final   10/14/2024 08:23 AM  "0.73 (L) 1.20 - 4.80 x10*3/uL Final   09/13/2024 09:58 AM 0.98 (L) 1.20 - 4.80 x10*3/uL Final     Monocytes Absolute   Date/Time Value Ref Range Status   11/15/2024 10:07 AM 0.66 0.10 - 1.00 x10*3/uL Final   10/14/2024 08:23 AM 0.82 0.10 - 1.00 x10*3/uL Final   09/13/2024 09:58 AM 0.84 0.10 - 1.00 x10*3/uL Final     Eosinophils Absolute   Date/Time Value Ref Range Status   11/15/2024 10:07 AM 0.07 0.00 - 0.70 x10*3/uL Final   10/14/2024 08:23 AM 0.05 0.00 - 0.70 x10*3/uL Final   09/13/2024 09:58 AM 0.05 0.00 - 0.70 x10*3/uL Final     Basophils Absolute   Date/Time Value Ref Range Status   11/15/2024 10:07 AM 0.03 0.00 - 0.10 x10*3/uL Final   10/14/2024 08:23 AM 0.05 0.00 - 0.10 x10*3/uL Final   09/13/2024 09:58 AM 0.02 0.00 - 0.10 x10*3/uL Final       No components found for: \"PT\"  No results found for: \"APTT\"    Assessment/Plan      The patient is a 63-year-old woman who presented in October 2023 with abdominal pain.  She was evaluated at [facility] ED in [place] and the CT scan revealed possible retroperitoneal lymphadenopathy initial biopsy was inconclusive repeat biopsy with flow cytometry revealed CD5 negative, CD10 negative kappa restricted lymphocytes, 4% of events.  Would like to obtain more information and if necessary consider bone marrow aspiration and biopsy.  The patient is agreeable for follow-up and return in 4 weeks.    The patient had called for follow-up on March 21, 2024 regarding history of abdominal pain and abnormal CT scan with retroperitoneal lymphadenopathy/thickening.  Physical examination was within normal limits.  I have personally discussed with Dr. Caicedo.  Initially a CT-guided biopsy was performed the specimen was inadequate.  Second biopsy flow cytometry was performed but once again it was inconclusive.  I explained to the patient above findings.  Options include    Repeat CT-guided biopsy again.    Do nothing.    Repeat blood work and CT scan of chest abdomen pelvis in 4 months and " consider a biopsy if abnormalities are found.  The patient has requested follow-up in 4 months and CT scan and blood work.  The patient was appreciative of the details provided and grateful.  The patient had called for follow-up on July 11, 2024 regarding history of abdominal pain and abnormal CT scans the patient complains of frontal headaches.  Was evaluated by Dr. Hebert and noted to have low iron saturation of 10%.  She was placed on ferrous sulfate 325 mg p.o. daily.  Headaches persist.  Also, patient had retroperitoneal lymphadenopathy and biopsies were attempted twice but inconclusive.  A PET scan was ordered.  Patient anxious to know results of the tests performed.  IgM kappa M protein 2.2 g/dL.  Very suspicious for low-grade lymphoma.  Recall that CT scan of abdomen pelvis had revealed retroperitoneal lymphadenopathy.  CT-guided biopsies attempted twice were inconclusive.  PET scan shows mild FDG avidity in bilateral axilla lymph nodes as well as retroperitoneal lymph nodes but no discrete mass.  If agreeable I have recommended a bone marrow aspiration and biopsy.  The patient had called for follow-up on August 23, 2024 regarding history of abdominal pain and abnormal CT scans the patient complains of frontal headaches.  Was evaluated by Dr. Hebert and noted to have low iron saturation of 10%.  She was placed on ferrous sulfate 325 mg p.o. daily.  Headaches persist.  Also, patient had retroperitoneal lymphadenopathy and biopsies were attempted twice but inconclusive.  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  I have recommended serum viscosity, ophthalmology evaluation for retinal exam.  The patient is symptomatic and requesting assistance.  Consider single agent Rituxan  SQ every 4 weeks after completion of evaluation.  Patient to return in 4 weeks.    Anemia:    Previous hemoglobin 11.1 g/dL recent hemoglobin in June 2024 had improved up to 11.3 g/dL.  Iron saturation decreased at 10%.  The patient was placed on ferrous sulfate 325 mg p.o. daily by .  I had a detailed discussion with the patient and explained to her that her symptoms do not necessarily correlate with her hemoglobin levels or low iron saturation.  However, the patient is convinced that her headache is due to low iron level.  However, I have given her a benefit of doubt and recommended increasing ferrous sulfate 325 mg p.o. half an hour after food daily, slowly increase to 3 times a day reassess in 6 weeks.  I also explained to the patient that it generally takes 4 to 6 weeks for hemoglobin to normalize after initiating iron therapy and 3 months to replenish iron stores.  If the patient does correct with iron replacement therapy it would suggest iron deficiency and would recommend GI evaluation.  The patient is agreeable.  The patient had called for follow-up on August 23, 2024 regarding history of abdominal pain and abnormal CT scans the patient complains of frontal headaches.  Was evaluated by Dr. Hebert and noted to have low iron saturation of 10%.  She was placed on ferrous sulfate 325 mg p.o. daily.  Headaches persist.  Also, patient had retroperitoneal lymphadenopathy and biopsies were attempted twice but inconclusive.  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  The patient has various symptoms such as abdominal pain, easy fatigability.  Although, she is able to carry on daily activities but feels very fatigued and gives herself a headache.  She has had headaches for  many years.  However, the other symptoms I have given her a benefit of doubt and recommended single agent Rituxan given weekly.  Printed material from [facility] provided.  Informed consent obtained.  Patient to initiate cycle 1 Rituxan on September 20, 2024.  Return in 2 weeks.  The patient had come  for follow-up on November 8 , 2024 regarding history of abdominal pain and abnormal CT scans the patient complains of frontal headaches.  Was evaluated by Dr. Hebert and noted to have low iron saturation of 10%.  She was placed on ferrous sulfate 325 mg p.o. daily.  Headaches persist.  Also, patient had retroperitoneal lymphadenopathy and biopsies were attempted twice but inconclusive.  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  The patient claims that she can carry on these work but feels fatigued at the end of the day and gives herself a headache.  In addition the patient also has abdominal pain.  Ophthalmology evaluation at [facility] did not reveal any abnormality related to elevated protein count/elevated viscosity.  Because of progressive and persistent symptoms the patient was started on weekly Rituxan beginning September 20, 2024.  After 1 treatment the patient claims that she is beginning to feel better.  Previously noted abdominal pain and headaches are less.  M protein down to 1.5 g/dL.  Cycle 4 of weekly Rituxan today.  PET scan on November 1, 2024 after cycle 4 Rituxan revealed Interval decrease in mild hypermetabolic activity of a  para-aortic soft tissue mass with activity at the level of blood  pool, compatible with post treatment changes. Deauville score 2  2. Interval decrease in mild hypermetabolic activity within bilateral  axillary lymph nodes, which are non-specific  3. No new  hypermetabolic navdeep or distant metastasis.  4. Hepatomegaly without focal hypermetabolic disease involvement..  Will continue weekly Rituxan x 4.    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  The patient claims that after completion of treatment feeling much better.  Headaches and abdominal discomfort have resolved overall feeling much better.  Hemoglobin has normalized to 12.9 g/dL.  Recommend serum viscosity today.  Consider restaging with CT scan of chest abdomen pelvis and eventually a bone marrow aspiration biopsy to confirm response.    Thank you for allowing me to participate in care of your patient if you have any questions please feel free to call me.  Diagnoses and all orders for this visit:  Retroperitoneal lymphadenopathy  -     Referral to Hematology and Oncology         Jefferson Morales MD

## 2024-12-21 LAB
ALBUMIN SERPL BCP-MCNC: 4.2 G/DL (ref 3.4–5)
ALP SERPL-CCNC: 69 U/L (ref 33–136)
ALT SERPL W P-5'-P-CCNC: 12 U/L (ref 7–45)
ANION GAP SERPL CALC-SCNC: 15 MMOL/L (ref 10–20)
AST SERPL W P-5'-P-CCNC: 14 U/L (ref 9–39)
B2 MICROGLOB SERPL-MCNC: 2.5 MG/L (ref 0.7–2.2)
BILIRUB SERPL-MCNC: 0.4 MG/DL (ref 0–1.2)
BUN SERPL-MCNC: 13 MG/DL (ref 6–23)
CALCIUM SERPL-MCNC: 9.6 MG/DL (ref 8.6–10.6)
CHLORIDE SERPL-SCNC: 102 MMOL/L (ref 98–107)
CO2 SERPL-SCNC: 28 MMOL/L (ref 21–32)
CREAT SERPL-MCNC: 0.78 MG/DL (ref 0.5–1.05)
EGFRCR SERPLBLD CKD-EPI 2021: 85 ML/MIN/1.73M*2
FERRITIN SERPL-MCNC: 67 NG/ML (ref 8–150)
FOLATE SERPL-MCNC: >24 NG/ML
GLUCOSE SERPL-MCNC: 79 MG/DL (ref 74–99)
HAPTOGLOB SERPL NEPH-MCNC: 41 MG/DL (ref 30–200)
IRON SATN MFR SERPL: 33 % (ref 25–45)
IRON SERPL-MCNC: 108 UG/DL (ref 35–150)
LDH SERPL L TO P-CCNC: 129 U/L (ref 84–246)
POTASSIUM SERPL-SCNC: 4.9 MMOL/L (ref 3.5–5.3)
PROT SERPL-MCNC: 7.2 G/DL (ref 6.4–8.2)
PROT SERPL-MCNC: 7.2 G/DL (ref 6.4–8.2)
SODIUM SERPL-SCNC: 140 MMOL/L (ref 136–145)
TIBC SERPL-MCNC: 332 UG/DL (ref 240–445)
UIBC SERPL-MCNC: 224 UG/DL (ref 110–370)
VIT B12 SERPL-MCNC: 720 PG/ML (ref 211–911)

## 2024-12-22 LAB
KAPPA LC SERPL-MCNC: 2.39 MG/DL (ref 0.33–1.94)
KAPPA LC/LAMBDA SER: 4.43 {RATIO} (ref 0.26–1.65)
LAMBDA LC SERPL-MCNC: 0.54 MG/DL (ref 0.57–2.63)

## 2024-12-23 LAB — HETEROPH AB SER QL: 2.24 CP (ref 1.5–1.8)

## 2024-12-26 LAB
ALBUMIN: 4 G/DL (ref 3.4–5)
ALPHA 1 GLOBULIN: 0.3 G/DL (ref 0.2–0.6)
ALPHA 2 GLOBULIN: 0.5 G/DL (ref 0.4–1.1)
BETA GLOBULIN: 2.1 G/DL (ref 0.5–1.2)
GAMMA GLOBULIN: 0.3 G/DL (ref 0.5–1.4)
IMMUNOFIXATION COMMENT: ABNORMAL
M-PROTEIN 1: 1.4 G/DL
M-PROTEIN 2: 0 G/DL
PATH REVIEW - SERUM IMMUNOFIXATION: ABNORMAL
PATH REVIEW-SERUM PROTEIN ELECTROPHORESIS: ABNORMAL
PROTEIN ELECTROPHORESIS COMMENT: ABNORMAL

## 2024-12-30 ENCOUNTER — PATIENT MESSAGE (OUTPATIENT)
Dept: HEMATOLOGY/ONCOLOGY | Facility: CLINIC | Age: 64
End: 2024-12-30
Payer: COMMERCIAL

## 2024-12-30 ENCOUNTER — TELEPHONE (OUTPATIENT)
Dept: HEMATOLOGY/ONCOLOGY | Facility: CLINIC | Age: 64
End: 2024-12-30
Payer: COMMERCIAL

## 2024-12-30 DIAGNOSIS — C85.80 MARGINAL ZONE B-CELL LYMPHOMA (MULTI): Primary | ICD-10-CM

## 2024-12-30 RX ORDER — CEFUROXIME AXETIL 250 MG/1
250 TABLET ORAL 2 TIMES DAILY
Qty: 20 TABLET | Refills: 0 | Status: SHIPPED | OUTPATIENT
Start: 2024-12-30 | End: 2025-01-09

## 2024-12-30 NOTE — TELEPHONE ENCOUNTER
Message sent to patient to advise of RX sent per Dr. Morales            Cefuroxime (Ceftin) 250 mg tablet        Sig: Take 1 tablet (250 mg) by mouth 2 times a day for 10 days.        Sent to pharmacy as: cefuroxime axetil 250 mg tablet (Ceftin)        Class: Normal        Route: oral        E-Prescribing Status: Receipt confirmed by pharmacy (12/30/2024 12:31 PM EST)

## 2024-12-30 NOTE — TELEPHONE ENCOUNTER
Good morning Gwendolyn!  I never have good success with Dr. Hebert’s office!  They either don’t answer or it’s this big long drama. I get a sinus infection once every two years, which is what I have now come down with. I don’t want to let a whole week go by before I can get in to see him( if I’m lyle!)was wondering if perhaps Dr. Morales Could please call in an antibiotic based on my health status?      Thanks!  Cecelia     Call placed to patient to assess symptoms.  Reports greenish sputum that started yesterday and reports she gets a sinus infection twice a year.  Denies fevers. Pt unable to stay on the phone to ask further assessment questions as she states she is currently at work.  Pt asking if Dr. Morales would send in an antibiotic as she is unable to get a hold of her PCP Dr. Hebert. Message forwarded to Dr. Morales to advise.

## 2025-01-31 ENCOUNTER — EDUCATION (OUTPATIENT)
Dept: HEMATOLOGY/ONCOLOGY | Facility: CLINIC | Age: 65
End: 2025-01-31

## 2025-01-31 ENCOUNTER — OFFICE VISIT (OUTPATIENT)
Dept: HEMATOLOGY/ONCOLOGY | Facility: CLINIC | Age: 65
End: 2025-01-31
Payer: COMMERCIAL

## 2025-01-31 VITALS
OXYGEN SATURATION: 94 % | DIASTOLIC BLOOD PRESSURE: 69 MMHG | SYSTOLIC BLOOD PRESSURE: 114 MMHG | HEART RATE: 63 BPM | BODY MASS INDEX: 20.63 KG/M2 | WEIGHT: 113.98 LBS | TEMPERATURE: 97.7 F | RESPIRATION RATE: 16 BRPM

## 2025-01-31 DIAGNOSIS — C85.80 MARGINAL ZONE B-CELL LYMPHOMA (MULTI): ICD-10-CM

## 2025-01-31 PROCEDURE — 99214 OFFICE O/P EST MOD 30 MIN: CPT | Performed by: INTERNAL MEDICINE

## 2025-01-31 ASSESSMENT — PAIN SCALES - GENERAL: PAINLEVEL_OUTOF10: 0-NO PAIN

## 2025-01-31 NOTE — PROGRESS NOTES
Patient ID: Cecelia Birmingham is a 64 y.o. female.  Referring Physician: Jefferson Morales MD  [address] [facility], [place] [id number]  Primary Care Provider: Heriberto Hebert MD  Visit Type: Initial Visit  Bone marrow aspiration and biopsy on August 2, 2024 revealed hypercellular bone marrow with involvement by CD5, CD10 negative lymphoproliferative disorder.    Plasma cell population with cytoplasmic kappa excess 5% present.    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    IgM kappa 2.2 g/dL, beta-2 microglobulin 3.7 mcg/dL.  Chromosomal analysis revealed 46XX, +4, +6, add(6)(q12)x2,-8[2]/46XX del(6)(q12q25)[2]/46XX[17]  Retroperitoneal lymphadenopathy.  Started single agent weekly Rituxan on 9/20/24 received total of 8 cycles.  Subjective    HPI  The patient was referred to me for further evaluation and management of retroperitoneal lymphadenopathy, evaluated on March 14, 2024.  The patient was doing well till October 25, 2023 when she noticed acute onset of sharp mid abdominal pain shortly after awakening from sleep.  It persisted through the day with associated bloating.  The patient worked the whole day but eventually presented to ED in [facility].  Did not have any fevers chills sweats nausea vomiting diarrhea constipation hematemesis melena hematochezia materia.  The patient was discharged on antibiotics after CT scans.  The patient claims that she is felt better bloating is resolved she does have minimal residual abdominal discomfort.  CT scan revealed aortocaval and left periaortic ill-defined soft tissue extending below the aortic bifurcation.  Differential diagnosis thought to be retroperitoneal fibrosis, idiopathic, not aneurysmal form of colonic aortitis or lymphoma.  There was no associated hydronephrosis.  A 2.8 cm mixed hyperattenuating and hypoattenuating mass was noted in the left lobe of the liver.  A tiny 7 mm lesion was  noted in the lower pole of the right kidney.  A follow-up MRI at  revealed simple cyst in the right kidney 5 mm at the lower pole.  Hemangioma noted in the liver corresponding to the CT finding.  The retroperitoneal soft tissue fullness is confirmed and not significantly different than the CT imaging.  The patient has never had abdominal operation.CT-guided biopsy on January 12, 2024 revealed limited tissue with an atypical lymphoid infiltrate.  Flow cytometry on February 6, 2024 revealed CD5 negative, CD10 negative, kappa restricted B-cell population 4% of total events no abnormal T-cell population identified.    At interview on March 14, 2024 the patient narrated entire history.  Denied history of weight loss, fevers, night sweats, chest pain, shortness of breath, nausea, vomiting, hematemesis, melena, hematochezia and hematuria.    The patient had called for follow-up on July 11, 2024 regarding history of abdominal pain and abnormal CT scans the patient complains of frontal headaches.  Was evaluated by Dr. Hebert and noted to have low iron saturation of 10%.  She was placed on ferrous sulfate 325 mg p.o. daily.  Headaches persist.  Also, patient had retroperitoneal lymphadenopathy and biopsies were attempted twice but inconclusive.  A PET scan was ordered.  Patient anxious to know results of the tests performed.    The patient had called for follow-up on August 23, 2024 regarding history of abdominal pain and abnormal CT scans the patient complains of frontal headaches.  Was evaluated by Dr. Hebert and noted to have low iron saturation of 10%.  She was placed on ferrous sulfate 325 mg p.o. daily.  Headaches persist.  Also, patient had retroperitoneal lymphadenopathy and biopsies were attempted twice but inconclusive.  A bone marrow aspiration and biopsy on August 2, 2024 revealed findings consistent with 5% plasma cells kappa restriction, suggestive of MGUS, hypercellular bone marrow with involvement of CD5, CD10  negative lymphoplasmacytic lymphoma with MYD 88 mutation and IgH mutation as well.  The patient had called for follow-up on September 13 2024 regarding history of abdominal pain and abnormal CT scans the patient complains of frontal headaches.  Was evaluated by Dr. Hebert and noted to have low iron saturation of 10%.  She was placed on ferrous sulfate 325 mg p.o. daily.  Headaches persist.  Also, patient had retroperitoneal lymphadenopathy and biopsies were attempted twice but inconclusive.  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  The patient claims that she can carry on these work but feels fatigued at the end of the day and gives herself a headache.  In addition the patient also has abdominal pain.  Op[facility]mology evaluation at [facility] did not reveal any abnormality related to elevated protein count/elevated viscosity.    The patient had come  for follow-up on November 8, 2024 regarding history of abdominal pain and abnormal CT scans the patient complains of frontal headaches.  Was evaluated by Dr. Hebert and noted to have low iron saturation of 10%.  She was placed on ferrous sulfate 325 mg p.o. daily.  Headaches persist.  Also, patient had retroperitoneal lymphadenopathy and biopsies were attempted twice but inconclusive.  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  The patient claims that she can carry on  work but feels fatigued at the end of the day and gives herself a headache.  In addition the patient also has abdominal pain.  Ophthalmology  evaluation at [facility] did not reveal any abnormality related to elevated protein count/elevated viscosity.  Because of progressive and persistent symptoms the patient was started on weekly Rituxan beginning September 20, 2024.  After 1 treatment the patient claims that she is beginning to feel better Previously noted abdominal pain and headaches are less.  Cycle 4 today M protein down to 1.5 g/dL.    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  The patient claims that after completion of treatment feeling much better.  Headaches and abdominal discomfort have resolved overall feeling much better.    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  The patient claims that after completion of treatment feeling much better.  Headaches and abdominal discomfort have resolved overall feeling much better.    Past medical history:    As above.    Past surgical history:  History of uterine ablation on March 2, 2020    Mammogram:    2 years ago.    Colonoscopy:    4 years ago.        Review of Systems   All other systems reviewed and are negative.       Objective   BSA: 1.51 meters squared  /69   Pulse 63   Temp 36.5 °C (97.7 °F) (Temporal)   Resp 16   Wt 51.7 kg (113 lb 15.7 oz)   SpO2 94%   BMI 20.63 kg/m²      has no past medical history on file.   has a past surgical history that includes Other surgical history (03/02/2020).  Family History   Problem Relation Name Age of Onset    Breast cancer Other Grandparent      Oncology History   Marginal zone B-cell lymphoma (Multi)   9/5/2024 Initial Diagnosis    Marginal zone B-cell lymphoma (Multi)     9/20/2024 - 10/11/2024 Chemotherapy    RiTUXimab  (Weekly), 28 Day Cycle      11/8/2024 -  Chemotherapy    RiTUXimab (Weekly), 28 Day Cycle          Cecelia Birmingham  reports that she has never smoked. She has never been exposed to tobacco smoke. She has never used smokeless tobacco.  She  reports current alcohol use of about 2.0 standard drinks of alcohol per week.  She  reports no history of drug use.    Physical Exam  Constitutional:       Appearance: Normal appearance.   HENT:      Head: Normocephalic and atraumatic.      Nose: Nose normal.      Mouth/Throat:      Mouth: Mucous membranes are moist.      Pharynx: Oropharynx is clear.   Eyes:      Extraocular Movements: Extraocular movements intact.      Conjunctiva/sclera: Conjunctivae normal.      Pupils: Pupils are equal, round, and reactive to light.   Cardiovascular:      Rate and Rhythm: Normal rate and regular rhythm.   Pulmonary:      Effort: Pulmonary effort is normal.      Breath sounds: Normal breath sounds.   Abdominal:      General: Abdomen is flat. Bowel sounds are normal.      Palpations: Abdomen is soft.   Musculoskeletal:         General: Normal range of motion.      Cervical back: Normal range of motion and neck supple.   Neurological:      General: No focal deficit present.      Mental Status: She is alert and oriented to person, place, and time. Mental status is at baseline.   Psychiatric:         Mood and Affect: Mood normal.         Behavior: Behavior normal.         Thought Content: Thought content normal.         Judgment: Judgment normal.         WBC   Date/Time Value Ref Range Status   12/20/2024 12:05 PM 5.8 4.4 - 11.3 x10*3/uL Final   11/15/2024 10:07 AM 4.4 4.4 - 11.3 x10*3/uL Final   10/14/2024 08:23 AM 4.6 4.4 - 11.3 x10*3/uL Final     nRBC   Date Value Ref Range Status   12/20/2024   Final     Comment:     Not Measured   11/15/2024   Final     Comment:     Not Measured   10/14/2024 0.0 0.0 - 0.0 /100 WBCs Final     RBC   Date Value Ref Range Status   12/20/2024 4.66 4.00 - 5.20  "x10*6/uL Final   11/15/2024 4.43 4.00 - 5.20 x10*6/uL Final   10/14/2024 4.00 4.00 - 5.20 x10*6/uL Final     Hemoglobin   Date Value Ref Range Status   12/20/2024 13.5 12.0 - 16.0 g/dL Final   11/15/2024 12.8 12.0 - 16.0 g/dL Final   10/14/2024 11.3 (L) 12.0 - 16.0 g/dL Final     Hematocrit   Date Value Ref Range Status   12/20/2024 40.4 36.0 - 46.0 % Final   11/15/2024 39.1 36.0 - 46.0 % Final   10/14/2024 34.4 (L) 36.0 - 46.0 % Final     MCV   Date/Time Value Ref Range Status   12/20/2024 12:05 PM 87 80 - 100 fL Final   11/15/2024 10:07 AM 88 80 - 100 fL Final   10/14/2024 08:23 AM 86 80 - 100 fL Final     MCH   Date/Time Value Ref Range Status   12/20/2024 12:05 PM 29.0 26.0 - 34.0 pg Final   11/15/2024 10:07 AM 28.9 26.0 - 34.0 pg Final   10/14/2024 08:23 AM 28.3 26.0 - 34.0 pg Final     MCHC   Date/Time Value Ref Range Status   12/20/2024 12:05 PM 33.4 32.0 - 36.0 g/dL Final   11/15/2024 10:07 AM 32.7 32.0 - 36.0 g/dL Final   10/14/2024 08:23 AM 32.8 32.0 - 36.0 g/dL Final     RDW   Date/Time Value Ref Range Status   12/20/2024 12:05 PM 13.6 11.5 - 14.5 % Final   11/15/2024 10:07 AM 13.3 11.5 - 14.5 % Final   10/14/2024 08:23 AM 13.9 11.5 - 14.5 % Final     Platelets   Date/Time Value Ref Range Status   12/20/2024 12:05  150 - 450 x10*3/uL Final   11/15/2024 10:07  150 - 450 x10*3/uL Final   10/14/2024 08:23  (H) 150 - 450 x10*3/uL Final     No results found for: \"MPV\"  Neutrophils %   Date/Time Value Ref Range Status   12/20/2024 12:05 PM 62.6 40.0 - 80.0 % Final   11/15/2024 10:07 AM 62.0 40.0 - 80.0 % Final   10/14/2024 08:23 AM 64.1 40.0 - 80.0 % Final     Immature Granulocytes %, Automated   Date/Time Value Ref Range Status   12/20/2024 12:05 PM 0.0 0.0 - 0.9 % Final     Comment:     Immature Granulocyte Count (IG) includes promyelocytes, myelocytes and metamyelocytes but does not include bands. Percent differential counts (%) should be interpreted in the context of the absolute cell " counts (cells/UL).   11/15/2024 10:07 AM 0.0 0.0 - 0.9 % Final     Comment:     Immature Granulocyte Count (IG) includes promyelocytes, myelocytes and metamyelocytes but does not include bands. Percent differential counts (%) should be interpreted in the context of the absolute cell counts (cells/UL).   10/14/2024 08:23 AM 0.2 0.0 - 0.9 % Final     Comment:     Immature Granulocyte Count (IG) includes promyelocytes, myelocytes and metamyelocytes but does not include bands. Percent differential counts (%) should be interpreted in the context of the absolute cell counts (cells/UL).     Lymphocytes %   Date/Time Value Ref Range Status   12/20/2024 12:05 PM 22.2 13.0 - 44.0 % Final   11/15/2024 10:07 AM 20.8 13.0 - 44.0 % Final   10/14/2024 08:23 AM 15.8 13.0 - 44.0 % Final     Monocytes %   Date/Time Value Ref Range Status   12/20/2024 12:05 PM 13.2 2.0 - 10.0 % Final   11/15/2024 10:07 AM 14.9 2.0 - 10.0 % Final   10/14/2024 08:23 AM 17.7 2.0 - 10.0 % Final     Eosinophils %   Date/Time Value Ref Range Status   12/20/2024 12:05 PM 1.5 0.0 - 6.0 % Final   11/15/2024 10:07 AM 1.6 0.0 - 6.0 % Final   10/14/2024 08:23 AM 1.1 0.0 - 6.0 % Final     Basophils %   Date/Time Value Ref Range Status   12/20/2024 12:05 PM 0.5 0.0 - 2.0 % Final   11/15/2024 10:07 AM 0.7 0.0 - 2.0 % Final   10/14/2024 08:23 AM 1.1 0.0 - 2.0 % Final     Neutrophils Absolute   Date/Time Value Ref Range Status   12/20/2024 12:05 PM 3.64 1.20 - 7.70 x10*3/uL Final     Comment:     Percent differential counts (%) should be interpreted in the context of the absolute cell counts (cells/uL).   11/15/2024 10:07 AM 2.74 1.20 - 7.70 x10*3/uL Final     Comment:     Percent differential counts (%) should be interpreted in the context of the absolute cell counts (cells/uL).   10/14/2024 08:23 AM 2.97 1.20 - 7.70 x10*3/uL Final     Comment:     Percent differential counts (%) should be interpreted in the context of the absolute cell counts (cells/uL).  "    Immature Granulocytes Absolute, Automated   Date/Time Value Ref Range Status   12/20/2024 12:05 PM 0.00 0.00 - 0.70 x10*3/uL Final   11/15/2024 10:07 AM 0.00 0.00 - 0.70 x10*3/uL Final   10/14/2024 08:23 AM 0.01 0.00 - 0.70 x10*3/uL Final     Lymphocytes Absolute   Date/Time Value Ref Range Status   12/20/2024 12:05 PM 1.29 1.20 - 4.80 x10*3/uL Final   11/15/2024 10:07 AM 0.92 (L) 1.20 - 4.80 x10*3/uL Final   10/14/2024 08:23 AM 0.73 (L) 1.20 - 4.80 x10*3/uL Final     Monocytes Absolute   Date/Time Value Ref Range Status   12/20/2024 12:05 PM 0.77 0.10 - 1.00 x10*3/uL Final   11/15/2024 10:07 AM 0.66 0.10 - 1.00 x10*3/uL Final   10/14/2024 08:23 AM 0.82 0.10 - 1.00 x10*3/uL Final     Eosinophils Absolute   Date/Time Value Ref Range Status   12/20/2024 12:05 PM 0.09 0.00 - 0.70 x10*3/uL Final   11/15/2024 10:07 AM 0.07 0.00 - 0.70 x10*3/uL Final   10/14/2024 08:23 AM 0.05 0.00 - 0.70 x10*3/uL Final     Basophils Absolute   Date/Time Value Ref Range Status   12/20/2024 12:05 PM 0.03 0.00 - 0.10 x10*3/uL Final   11/15/2024 10:07 AM 0.03 0.00 - 0.10 x10*3/uL Final   10/14/2024 08:23 AM 0.05 0.00 - 0.10 x10*3/uL Final       No components found for: \"PT\"  No results found for: \"APTT\"    Assessment/Plan      The patient is a 63-year-old woman who presented in October 2023 with abdominal pain.  She was evaluated at [facility] ED in [place] and the CT scan revealed possible retroperitoneal lymphadenopathy initial biopsy was inconclusive repeat biopsy with flow cytometry revealed CD5 negative, CD10 negative kappa restricted lymphocytes, 4% of events.  Would like to obtain more information and if necessary consider bone marrow aspiration and biopsy.  The patient is agreeable for follow-up and return in 4 weeks.    The patient had called for follow-up on March 21, 2024 regarding history of abdominal pain and abnormal CT scan with retroperitoneal lymphadenopathy/thickening.  Physical examination was within normal limits.  I have " personally discussed with Dr. Caicedo.  Initially a CT-guided biopsy was performed the specimen was inadequate.  Second biopsy flow cytometry was performed but once again it was inconclusive.  I explained to the patient above findings.  Options include    Repeat CT-guided biopsy again.    Do nothing.    Repeat blood work and CT scan of chest abdomen pelvis in 4 months and consider a biopsy if abnormalities are found.  The patient has requested follow-up in 4 months and CT scan and blood work.  The patient was appreciative of the details provided and grateful.  The patient had called for follow-up on July 11, 2024 regarding history of abdominal pain and abnormal CT scans the patient complains of frontal headaches.  Was evaluated by Dr. Hebert and noted to have low iron saturation of 10%.  She was placed on ferrous sulfate 325 mg p.o. daily.  Headaches persist.  Also, patient had retroperitoneal lymphadenopathy and biopsies were attempted twice but inconclusive.  A PET scan was ordered.  Patient anxious to know results of the tests performed.  IgM kappa M protein 2.2 g/dL.  Very suspicious for low-grade lymphoma.  Recall that CT scan of abdomen pelvis had revealed retroperitoneal lymphadenopathy.  CT-guided biopsies attempted twice were inconclusive.  PET scan shows mild FDG avidity in bilateral axilla lymph nodes as well as retroperitoneal lymph nodes but no discrete mass.  If agreeable I have recommended a bone marrow aspiration and biopsy.  The patient had called for follow-up on August 23, 2024 regarding history of abdominal pain and abnormal CT scans the patient complains of frontal headaches.  Was evaluated by Dr. Hebert and noted to have low iron saturation of 10%.  She was placed on ferrous sulfate 325 mg p.o. daily.  Headaches persist.  Also, patient had retroperitoneal lymphadenopathy and biopsies were attempted twice but inconclusive.  A bone marrow aspiration and biopsy on August 2, 2024 revealed findings  consistent with 5% plasma cells kappa restriction, suggestive of MGUS, hypercellular bone marrow with involvement of CD5, CD10 negative lymphoplasmacytic lymphoma with MYD 88 mutation and IgH mutation as well.  I have recommended serum viscosity, ophthalmology evaluation for retinal exam.  The patient is symptomatic and requesting assistance.  Consider single agent Rituxan SQ every 4 weeks after completion of evaluation.  Patient to return in 4 weeks.    Anemia:    Previous hemoglobin 11.1 g/dL recent hemoglobin in June 2024 had improved up to 11.3 g/dL.  Iron saturation decreased at 10%.  The patient was placed on ferrous sulfate 325 mg p.o. daily by .  I had a detailed discussion with the patient and explained to her that her symptoms do not necessarily correlate with her hemoglobin levels or low iron saturation.  However, the patient is convinced that her headache is due to low iron level.  However, I have given her a benefit of doubt and recommended increasing ferrous sulfate 325 mg p.o. half an hour after food daily, slowly increase to 3 times a day reassess in 6 weeks.  I also explained to the patient that it generally takes 4 to 6 weeks for hemoglobin to normalize after initiating iron therapy and 3 months to replenish iron stores.  If the patient does correct with iron replacement therapy it would suggest iron deficiency and would recommend GI evaluation.  The patient is agreeable.  The patient had called for follow-up on August 23, 2024 regarding history of abdominal pain and abnormal CT scans the patient complains of frontal headaches.  Was evaluated by Dr. Hebert and noted to have low iron saturation of 10%.  She was placed on ferrous sulfate 325 mg p.o. daily.  Headaches persist.  Also, patient had retroperitoneal lymphadenopathy and biopsies were attempted twice but inconclusive.  A bone marrow aspiration and biopsy on August 2, 2024 revealed findings consistent with 5% plasma cells kappa  restriction, suggestive of MGUS, hypercellular bone marrow with involvement of CD5, CD10 negative lymphoplasmacytic lymphoma with MYD 88 mutation and IgH mutation as well.  The patient has various symptoms such as abdominal pain, easy fatigability.  Although, she is able to carry on daily activities but feels very fatigued and gives herself a headache.  She has had headaches for many years.  However, the other symptoms I have given her a benefit of doubt and recommended single agent Rituxan given weekly.  Printed material from [facility] provided.  Informed consent obtained.  Patient to initiate cycle 1 Rituxan on September 20, 2024.  Return in 2 weeks.  The patient had come  for follow-up on November 8 , 2024 regarding history of abdominal pain and abnormal CT scans the patient complains of frontal headaches.  Was evaluated by Dr. Hebert and noted to have low iron saturation of 10%.  She was placed on ferrous sulfate 325 mg p.o. daily.  Headaches persist.  Also, patient had retroperitoneal lymphadenopathy and biopsies were attempted twice but inconclusive.  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  The patient claims that she can carry on these work but feels fatigued at the end of the day and gives herself a headache.  In addition the patient also has abdominal pain.  Ophthalmology evaluation at [facility] did not reveal any abnormality related to elevated protein count/elevated viscosity.  Because of progressive and persistent symptoms the patient was started on weekly Rituxan beginning September 20, 2024.  After 1 treatment the patient claims that she is beginning to feel better.  Previously noted abdominal pain and headaches are less.  M protein down to 1.5 g/dL.  Cycle 4 of weekly Rituxan  today.  PET scan on November 1, 2024 after cycle 4 Rituxan revealed Interval decrease in mild hypermetabolic activity of a  para-aortic soft tissue mass with activity at the level of blood  pool, compatible with post treatment changes. Deauville score 2  2. Interval decrease in mild hypermetabolic activity within bilateral  axillary lymph nodes, which are non-specific  3. No new hypermetabolic navdeep or distant metastasis.  4. Hepatomegaly without focal hypermetabolic disease involvement..  Will continue weekly Rituxan x 4.    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  The patient claims that after completion of treatment feeling much better.  Headaches and abdominal discomfort have resolved overall feeling much better.  Hemoglobin has normalized to 12.9 g/dL.  Recommend serum viscosity today.  Consider restaging with CT scan of chest abdomen pelvis and eventually a bone marrow aspiration biopsy to confirm response.    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  The patient claims that after completion of treatment feeling much better.  Headaches and abdominal discomfort have resolved overall feeling much better.  Return in 3 months.      Thank you for allowing me to participate in care of your patient if you have any questions please feel free to call me.  Diagnoses and all orders for this visit:  Retroperitoneal lymphadenopathy  -     Referral to Hematology and Oncology         Jefferson Morales MD

## 2025-01-31 NOTE — PROGRESS NOTES
"Patient seen by Dr. Juan today in clinic. Reinforcement education provided regarding next steps with plan of care.      PER DR. 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  The patient claims that after completion of treatment feeling much better.  Headaches and abdominal discomfort have resolved overall feeling much better.  Return in 3 months. \"    Labs prior to next FUV in 3 mos.  Pt going to [facility].  Patient verbalizes understanding of plan of care via teachback method.             "

## 2025-04-24 ENCOUNTER — LAB (OUTPATIENT)
Dept: LAB | Facility: CLINIC | Age: 65
End: 2025-04-24
Payer: COMMERCIAL

## 2025-04-24 DIAGNOSIS — R59.0 RETROPERITONEAL LYMPHADENOPATHY: ICD-10-CM

## 2025-04-24 DIAGNOSIS — C85.80 MARGINAL ZONE B-CELL LYMPHOMA (MULTI): ICD-10-CM

## 2025-04-24 DIAGNOSIS — R93.5 ABNORMAL CT OF THE ABDOMEN: ICD-10-CM

## 2025-04-24 LAB
BASOPHILS # BLD AUTO: 0.02 X10*3/UL (ref 0–0.1)
BASOPHILS NFR BLD AUTO: 0.5 %
EOSINOPHIL # BLD AUTO: 0.07 X10*3/UL (ref 0–0.7)
EOSINOPHIL NFR BLD AUTO: 1.7 %
ERYTHROCYTE [DISTWIDTH] IN BLOOD BY AUTOMATED COUNT: 12.7 % (ref 11.5–14.5)
HCT VFR BLD AUTO: 42.5 % (ref 36–46)
HGB BLD-MCNC: 14.5 G/DL (ref 12–16)
IMM GRANULOCYTES # BLD AUTO: 0 X10*3/UL (ref 0–0.7)
IMM GRANULOCYTES NFR BLD AUTO: 0 % (ref 0–0.9)
LYMPHOCYTES # BLD AUTO: 1.08 X10*3/UL (ref 1.2–4.8)
LYMPHOCYTES NFR BLD AUTO: 26.9 %
MCH RBC QN AUTO: 30 PG (ref 26–34)
MCHC RBC AUTO-ENTMCNC: 34.1 G/DL (ref 32–36)
MCV RBC AUTO: 88 FL (ref 80–100)
MONOCYTES # BLD AUTO: 0.63 X10*3/UL (ref 0.1–1)
MONOCYTES NFR BLD AUTO: 15.7 %
NEUTROPHILS # BLD AUTO: 2.21 X10*3/UL (ref 1.2–7.7)
NEUTROPHILS NFR BLD AUTO: 55.2 %
NRBC BLD-RTO: ABNORMAL /100{WBCS}
PLATELET # BLD AUTO: 290 X10*3/UL (ref 150–450)
RBC # BLD AUTO: 4.84 X10*6/UL (ref 4–5.2)
WBC # BLD AUTO: 4 X10*3/UL (ref 4.4–11.3)

## 2025-04-24 PROCEDURE — 80053 COMPREHEN METABOLIC PANEL: CPT

## 2025-04-24 PROCEDURE — 84165 PROTEIN E-PHORESIS SERUM: CPT

## 2025-04-24 PROCEDURE — 84165 PROTEIN E-PHORESIS SERUM: CPT | Performed by: STUDENT IN AN ORGANIZED HEALTH CARE EDUCATION/TRAINING PROGRAM

## 2025-04-24 PROCEDURE — 83521 IG LIGHT CHAINS FREE EACH: CPT

## 2025-04-24 PROCEDURE — 83615 LACTATE (LD) (LDH) ENZYME: CPT

## 2025-04-24 PROCEDURE — 84155 ASSAY OF PROTEIN SERUM: CPT | Mod: 59

## 2025-04-24 PROCEDURE — 85810 BLOOD VISCOSITY EXAMINATION: CPT

## 2025-04-24 PROCEDURE — 86320 SERUM IMMUNOELECTROPHORESIS: CPT | Performed by: STUDENT IN AN ORGANIZED HEALTH CARE EDUCATION/TRAINING PROGRAM

## 2025-04-24 PROCEDURE — 36415 COLL VENOUS BLD VENIPUNCTURE: CPT

## 2025-04-24 PROCEDURE — 82232 ASSAY OF BETA-2 PROTEIN: CPT

## 2025-04-24 PROCEDURE — 83540 ASSAY OF IRON: CPT

## 2025-04-24 PROCEDURE — 82728 ASSAY OF FERRITIN: CPT

## 2025-04-24 PROCEDURE — 85025 COMPLETE CBC W/AUTO DIFF WBC: CPT

## 2025-04-25 LAB
ALBUMIN SERPL BCP-MCNC: 4.6 G/DL (ref 3.4–5)
ALP SERPL-CCNC: 77 U/L (ref 33–136)
ALT SERPL W P-5'-P-CCNC: 11 U/L (ref 7–45)
ANION GAP SERPL CALC-SCNC: 13 MMOL/L (ref 10–20)
AST SERPL W P-5'-P-CCNC: 14 U/L (ref 9–39)
B2 MICROGLOB SERPL-MCNC: 2.3 MG/L (ref 0.7–2.2)
BILIRUB SERPL-MCNC: 0.6 MG/DL (ref 0–1.2)
BUN SERPL-MCNC: 13 MG/DL (ref 6–23)
CALCIUM SERPL-MCNC: 10.2 MG/DL (ref 8.6–10.6)
CHLORIDE SERPL-SCNC: 102 MMOL/L (ref 98–107)
CO2 SERPL-SCNC: 30 MMOL/L (ref 21–32)
CREAT SERPL-MCNC: 0.68 MG/DL (ref 0.5–1.05)
EGFRCR SERPLBLD CKD-EPI 2021: >90 ML/MIN/1.73M*2
FERRITIN SERPL-MCNC: 56 NG/ML (ref 8–150)
GLUCOSE SERPL-MCNC: 78 MG/DL (ref 74–99)
IRON SATN MFR SERPL: 32 % (ref 25–45)
IRON SERPL-MCNC: 109 UG/DL (ref 35–150)
KAPPA LC SERPL-MCNC: 1.21 MG/DL (ref 0.33–1.94)
KAPPA LC/LAMBDA SER: 3.56 {RATIO} (ref 0.26–1.65)
LAMBDA LC SERPL-MCNC: 0.34 MG/DL (ref 0.57–2.63)
LDH SERPL L TO P-CCNC: 138 U/L (ref 84–246)
POTASSIUM SERPL-SCNC: 5 MMOL/L (ref 3.5–5.3)
PROT SERPL-MCNC: 6.5 G/DL (ref 6.4–8.2)
PROT SERPL-MCNC: 6.5 G/DL (ref 6.4–8.2)
SODIUM SERPL-SCNC: 140 MMOL/L (ref 136–145)
TIBC SERPL-MCNC: 344 UG/DL (ref 240–445)
UIBC SERPL-MCNC: 235 UG/DL (ref 110–370)

## 2025-04-28 LAB — HETEROPH AB SER QL: 1.45 CP (ref 1.5–1.8)

## 2025-04-29 LAB
ALBUMIN: 4.2 G/DL (ref 3.4–5)
ALPHA 1 GLOBULIN: 0.3 G/DL (ref 0.2–0.6)
ALPHA 2 GLOBULIN: 0.5 G/DL (ref 0.4–1.1)
BETA GLOBULIN: 1.2 G/DL (ref 0.5–1.2)
GAMMA GLOBULIN: 0.3 G/DL (ref 0.5–1.4)
IMMUNOFIXATION COMMENT: ABNORMAL
M-PROTEIN 1: 0.6 G/DL (ref ?–0)
PATH REVIEW - SERUM IMMUNOFIXATION: ABNORMAL
PATH REVIEW-SERUM PROTEIN ELECTROPHORESIS: ABNORMAL
PROTEIN ELECTROPHORESIS COMMENT: ABNORMAL

## 2025-05-02 ENCOUNTER — OFFICE VISIT (OUTPATIENT)
Dept: HEMATOLOGY/ONCOLOGY | Facility: CLINIC | Age: 65
End: 2025-05-02
Payer: COMMERCIAL

## 2025-05-02 ENCOUNTER — EDUCATION (OUTPATIENT)
Dept: HEMATOLOGY/ONCOLOGY | Facility: CLINIC | Age: 65
End: 2025-05-02

## 2025-05-02 VITALS
BODY MASS INDEX: 20.73 KG/M2 | DIASTOLIC BLOOD PRESSURE: 72 MMHG | HEART RATE: 60 BPM | TEMPERATURE: 97.9 F | OXYGEN SATURATION: 94 % | SYSTOLIC BLOOD PRESSURE: 116 MMHG | WEIGHT: 114.53 LBS | RESPIRATION RATE: 16 BRPM

## 2025-05-02 DIAGNOSIS — C85.80 MARGINAL ZONE B-CELL LYMPHOMA (MULTI): ICD-10-CM

## 2025-05-02 PROCEDURE — 99214 OFFICE O/P EST MOD 30 MIN: CPT | Performed by: INTERNAL MEDICINE

## 2025-05-02 ASSESSMENT — PAIN SCALES - GENERAL: PAINLEVEL_OUTOF10: 0-NO PAIN

## 2025-05-02 NOTE — PROGRESS NOTES
"Patient seen by Dr. Juan today in clinic. Reinforcement education provided regarding next steps with plan of care.      PER DR. 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  The patient claims that after completion of treatment feeling much better.  Headaches and abdominal discomfort have resolved overall feeling much better.  M protein 0.6 g/dL, on April 24, 2025 it was 2.4 g/dL on October 14, 2024. \"    Pt reports she will go to [facility] for blood work prior to next FUV in 3 mos.  Patient verbalizes understanding of plan of care via teachback method.     "

## 2025-05-02 NOTE — PROGRESS NOTES
Patient ID: Cecelia Birmingham is a 64 y.o. female.  Referring Physician: Jefferson Morales MD  [address] [facility], [place] [id number]  Primary Care Provider: Heriberto Hebert MD  Visit Type: Initial Visit  Bone marrow aspiration and biopsy on August 2, 2024 revealed hypercellular bone marrow with involvement by CD5, CD10 negative lymphoproliferative disorder.    Plasma cell population with cytoplasmic kappa excess 5% present.    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    IgM kappa 2.2 g/dL, beta-2 microglobulin 3.7 mcg/dL.  Chromosomal analysis revealed 46XX, +4, +6, add(6)(q12)x2,-8[2]/46XX del(6)(q12q25)[2]/46XX[17]  Retroperitoneal lymphadenopathy.  Started single agent weekly Rituxan on 9/20/24 received total of 8 cycles.  Subjective    HPI  The patient was referred to me for further evaluation and management of retroperitoneal lymphadenopathy, evaluated on March 14, 2024.  The patient was doing well till October 25, 2023 when she noticed acute onset of sharp mid abdominal pain shortly after awakening from sleep.  It persisted through the day with associated bloating.  The patient worked the whole day but eventually presented to ED in [facility].  Did not have any fevers chills sweats nausea vomiting diarrhea constipation hematemesis melena hematochezia materia.  The patient was discharged on antibiotics after CT scans.  The patient claims that she is felt better bloating is resolved she does have minimal residual abdominal discomfort.  CT scan revealed aortocaval and left periaortic ill-defined soft tissue extending below the aortic bifurcation.  Differential diagnosis thought to be retroperitoneal fibrosis, idiopathic, not aneurysmal form of colonic aortitis or lymphoma.  There was no associated hydronephrosis.  A 2.8 cm mixed hyperattenuating and hypoattenuating mass was noted in the left lobe of the liver.  A tiny 7 mm lesion was  noted in the lower pole of the right kidney.  A follow-up MRI at  revealed simple cyst in the right kidney 5 mm at the lower pole.  Hemangioma noted in the liver corresponding to the CT finding.  The retroperitoneal soft tissue fullness is confirmed and not significantly different than the CT imaging.  The patient has never had abdominal operation.CT-guided biopsy on January 12, 2024 revealed limited tissue with an atypical lymphoid infiltrate.  Flow cytometry on February 6, 2024 revealed CD5 negative, CD10 negative, kappa restricted B-cell population 4% of total events no abnormal T-cell population identified.    At interview on March 14, 2024 the patient narrated entire history.  Denied history of weight loss, fevers, night sweats, chest pain, shortness of breath, nausea, vomiting, hematemesis, melena, hematochezia and hematuria.    The patient had called for follow-up on July 11, 2024 regarding history of abdominal pain and abnormal CT scans the patient complains of frontal headaches.  Was evaluated by Dr. Hebert and noted to have low iron saturation of 10%.  She was placed on ferrous sulfate 325 mg p.o. daily.  Headaches persist.  Also, patient had retroperitoneal lymphadenopathy and biopsies were attempted twice but inconclusive.  A PET scan was ordered.  Patient anxious to know results of the tests performed.    The patient had called for follow-up on August 23, 2024 regarding history of abdominal pain and abnormal CT scans the patient complains of frontal headaches.  Was evaluated by Dr. Hebert and noted to have low iron saturation of 10%.  She was placed on ferrous sulfate 325 mg p.o. daily.  Headaches persist.  Also, patient had retroperitoneal lymphadenopathy and biopsies were attempted twice but inconclusive.  A bone marrow aspiration and biopsy on August 2, 2024 revealed findings consistent with 5% plasma cells kappa restriction, suggestive of MGUS, hypercellular bone marrow with involvement of CD5, CD10  negative lymphoplasmacytic lymphoma with MYD 88 mutation and IgH mutation as well.  The patient had called for follow-up on September 13 2024 regarding history of abdominal pain and abnormal CT scans the patient complains of frontal headaches.  Was evaluated by Dr. Hebert and noted to have low iron saturation of 10%.  She was placed on ferrous sulfate 325 mg p.o. daily.  Headaches persist.  Also, patient had retroperitoneal lymphadenopathy and biopsies were attempted twice but inconclusive.  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  The patient claims that she can carry on these work but feels fatigued at the end of the day and gives herself a headache.  In addition the patient also has abdominal pain.  Ophthalmology evaluation at [facility] did not reveal any abnormality related to elevated protein count/elevated viscosity.    The patient had come  for follow-up on November 8, 2024 regarding history of abdominal pain and abnormal CT scans the patient complains of frontal headaches.  Was evaluated by Dr. Hebert and noted to have low iron saturation of 10%.  She was placed on ferrous sulfate 325 mg p.o. daily.  Headaches persist.  Also, patient had retroperitoneal lymphadenopathy and biopsies were attempted twice but inconclusive.  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  The patient claims that she can carry on  work but feels fatigued at the end of the day and gives herself a headache.  In addition the patient also has abdominal pain.  Ophthalmology  evaluation at [facility] did not reveal any abnormality related to elevated protein count/elevated viscosity.  Because of progressive and persistent symptoms the patient was started on weekly Rituxan beginning September 20, 2024.  After 1 treatment the patient claims that she is beginning to feel better Previously noted abdominal pain and headaches are less.  Cycle 4 today M protein down to 1.5 g/dL.    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  The patient claims that after completion of treatment feeling much better.  Headaches and abdominal discomfort have resolved overall feeling much better.    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  The patient claims that after completion of treatment feeling much better.  Headaches and abdominal discomfort have resolved overall feeling much better.    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  The patient claims that after completion of treatment feeling much better.  Headaches and abdominal discomfort have resolved overall feeling much better.    Past medical history:    As above.    Past surgical history:  History of uterine ablation on March 2, 2020    Mammogram:    2 years ago.    Colonoscopy:    4 years ago.        Review of Systems   All other systems reviewed and are negative.       Objective   BSA: 1.51 meters squared  /72   Pulse 60   Temp 36.6 °C (97.9 °F)   Resp 16   Wt 51.9 kg (114 lb 8.5 oz)   SpO2 94%   BMI 20.73 kg/m²      has no past medical history on file.   has a  past surgical history that includes Other surgical history (03/02/2020).  Family History   Problem Relation Name Age of Onset    Breast cancer Other Grandparent      Oncology History   Marginal zone B-cell lymphoma (Multi)   9/5/2024 Initial Diagnosis    Marginal zone B-cell lymphoma (Multi)     9/20/2024 - 10/11/2024 Chemotherapy    RiTUXimab (Weekly), 28 Day Cycle      11/8/2024 -  Chemotherapy    RiTUXimab (Weekly), 28 Day Cycle          Cecelia Birmingham  reports that she has never smoked. She has never been exposed to tobacco smoke. She has never used smokeless tobacco.  She  reports current alcohol use of about 2.0 standard drinks of alcohol per week.  She  reports no history of drug use.    Physical Exam  Constitutional:       Appearance: Normal appearance.   HENT:      Head: Normocephalic and atraumatic.      Nose: Nose normal.      Mouth/Throat:      Mouth: Mucous membranes are moist.      Pharynx: Oropharynx is clear.   Eyes:      Extraocular Movements: Extraocular movements intact.      Conjunctiva/sclera: Conjunctivae normal.      Pupils: Pupils are equal, round, and reactive to light.   Cardiovascular:      Rate and Rhythm: Normal rate and regular rhythm.   Pulmonary:      Effort: Pulmonary effort is normal.      Breath sounds: Normal breath sounds.   Abdominal:      General: Abdomen is flat. Bowel sounds are normal.      Palpations: Abdomen is soft.   Musculoskeletal:         General: Normal range of motion.      Cervical back: Normal range of motion and neck supple.   Neurological:      General: No focal deficit present.      Mental Status: She is alert and oriented to person, place, and time. Mental status is at baseline.   Psychiatric:         Mood and Affect: Mood normal.         Behavior: Behavior normal.         Thought Content: Thought content normal.         Judgment: Judgment normal.         WBC   Date/Time Value Ref Range Status   04/24/2025 09:55 AM 4.0 (L) 4.4 - 11.3 x10*3/uL Final  "  12/20/2024 12:05 PM 5.8 4.4 - 11.3 x10*3/uL Final   11/15/2024 10:07 AM 4.4 4.4 - 11.3 x10*3/uL Final     nRBC   Date Value Ref Range Status   04/24/2025   Final     Comment:     Not Measured   12/20/2024   Final     Comment:     Not Measured   11/15/2024   Final     Comment:     Not Measured     RBC   Date Value Ref Range Status   04/24/2025 4.84 4.00 - 5.20 x10*6/uL Final   12/20/2024 4.66 4.00 - 5.20 x10*6/uL Final   11/15/2024 4.43 4.00 - 5.20 x10*6/uL Final     Hemoglobin   Date Value Ref Range Status   04/24/2025 14.5 12.0 - 16.0 g/dL Final   12/20/2024 13.5 12.0 - 16.0 g/dL Final   11/15/2024 12.8 12.0 - 16.0 g/dL Final     Hematocrit   Date Value Ref Range Status   04/24/2025 42.5 36.0 - 46.0 % Final   12/20/2024 40.4 36.0 - 46.0 % Final   11/15/2024 39.1 36.0 - 46.0 % Final     MCV   Date/Time Value Ref Range Status   04/24/2025 09:55 AM 88 80 - 100 fL Final   12/20/2024 12:05 PM 87 80 - 100 fL Final   11/15/2024 10:07 AM 88 80 - 100 fL Final     MCH   Date/Time Value Ref Range Status   04/24/2025 09:55 AM 30.0 26.0 - 34.0 pg Final   12/20/2024 12:05 PM 29.0 26.0 - 34.0 pg Final   11/15/2024 10:07 AM 28.9 26.0 - 34.0 pg Final     MCHC   Date/Time Value Ref Range Status   04/24/2025 09:55 AM 34.1 32.0 - 36.0 g/dL Final   12/20/2024 12:05 PM 33.4 32.0 - 36.0 g/dL Final   11/15/2024 10:07 AM 32.7 32.0 - 36.0 g/dL Final     RDW   Date/Time Value Ref Range Status   04/24/2025 09:55 AM 12.7 11.5 - 14.5 % Final   12/20/2024 12:05 PM 13.6 11.5 - 14.5 % Final   11/15/2024 10:07 AM 13.3 11.5 - 14.5 % Final     Platelets   Date/Time Value Ref Range Status   04/24/2025 09:55  150 - 450 x10*3/uL Final   12/20/2024 12:05  150 - 450 x10*3/uL Final   11/15/2024 10:07  150 - 450 x10*3/uL Final     No results found for: \"MPV\"  Neutrophils %   Date/Time Value Ref Range Status   04/24/2025 09:55 AM 55.2 40.0 - 80.0 % Final   12/20/2024 12:05 PM 62.6 40.0 - 80.0 % Final   11/15/2024 10:07 AM 62.0 40.0 - 80.0 " % Final     Immature Granulocytes %, Automated   Date/Time Value Ref Range Status   04/24/2025 09:55 AM 0.0 0.0 - 0.9 % Final     Comment:     Immature Granulocyte Count (IG) includes promyelocytes, myelocytes and metamyelocytes but does not include bands. Percent differential counts (%) should be interpreted in the context of the absolute cell counts (cells/UL).   12/20/2024 12:05 PM 0.0 0.0 - 0.9 % Final     Comment:     Immature Granulocyte Count (IG) includes promyelocytes, myelocytes and metamyelocytes but does not include bands. Percent differential counts (%) should be interpreted in the context of the absolute cell counts (cells/UL).   11/15/2024 10:07 AM 0.0 0.0 - 0.9 % Final     Comment:     Immature Granulocyte Count (IG) includes promyelocytes, myelocytes and metamyelocytes but does not include bands. Percent differential counts (%) should be interpreted in the context of the absolute cell counts (cells/UL).     Lymphocytes %   Date/Time Value Ref Range Status   04/24/2025 09:55 AM 26.9 13.0 - 44.0 % Final   12/20/2024 12:05 PM 22.2 13.0 - 44.0 % Final   11/15/2024 10:07 AM 20.8 13.0 - 44.0 % Final     Monocytes %   Date/Time Value Ref Range Status   04/24/2025 09:55 AM 15.7 2.0 - 10.0 % Final   12/20/2024 12:05 PM 13.2 2.0 - 10.0 % Final   11/15/2024 10:07 AM 14.9 2.0 - 10.0 % Final     Eosinophils %   Date/Time Value Ref Range Status   04/24/2025 09:55 AM 1.7 0.0 - 6.0 % Final   12/20/2024 12:05 PM 1.5 0.0 - 6.0 % Final   11/15/2024 10:07 AM 1.6 0.0 - 6.0 % Final     Basophils %   Date/Time Value Ref Range Status   04/24/2025 09:55 AM 0.5 0.0 - 2.0 % Final   12/20/2024 12:05 PM 0.5 0.0 - 2.0 % Final   11/15/2024 10:07 AM 0.7 0.0 - 2.0 % Final     Neutrophils Absolute   Date/Time Value Ref Range Status   04/24/2025 09:55 AM 2.21 1.20 - 7.70 x10*3/uL Final     Comment:     Percent differential counts (%) should be interpreted in the context of the absolute cell counts (cells/uL).   12/20/2024 12:05 PM  "3.64 1.20 - 7.70 x10*3/uL Final     Comment:     Percent differential counts (%) should be interpreted in the context of the absolute cell counts (cells/uL).   11/15/2024 10:07 AM 2.74 1.20 - 7.70 x10*3/uL Final     Comment:     Percent differential counts (%) should be interpreted in the context of the absolute cell counts (cells/uL).     Immature Granulocytes Absolute, Automated   Date/Time Value Ref Range Status   04/24/2025 09:55 AM 0.00 0.00 - 0.70 x10*3/uL Final   12/20/2024 12:05 PM 0.00 0.00 - 0.70 x10*3/uL Final   11/15/2024 10:07 AM 0.00 0.00 - 0.70 x10*3/uL Final     Lymphocytes Absolute   Date/Time Value Ref Range Status   04/24/2025 09:55 AM 1.08 (L) 1.20 - 4.80 x10*3/uL Final   12/20/2024 12:05 PM 1.29 1.20 - 4.80 x10*3/uL Final   11/15/2024 10:07 AM 0.92 (L) 1.20 - 4.80 x10*3/uL Final     Monocytes Absolute   Date/Time Value Ref Range Status   04/24/2025 09:55 AM 0.63 0.10 - 1.00 x10*3/uL Final   12/20/2024 12:05 PM 0.77 0.10 - 1.00 x10*3/uL Final   11/15/2024 10:07 AM 0.66 0.10 - 1.00 x10*3/uL Final     Eosinophils Absolute   Date/Time Value Ref Range Status   04/24/2025 09:55 AM 0.07 0.00 - 0.70 x10*3/uL Final   12/20/2024 12:05 PM 0.09 0.00 - 0.70 x10*3/uL Final   11/15/2024 10:07 AM 0.07 0.00 - 0.70 x10*3/uL Final     Basophils Absolute   Date/Time Value Ref Range Status   04/24/2025 09:55 AM 0.02 0.00 - 0.10 x10*3/uL Final   12/20/2024 12:05 PM 0.03 0.00 - 0.10 x10*3/uL Final   11/15/2024 10:07 AM 0.03 0.00 - 0.10 x10*3/uL Final       No components found for: \"PT\"  No results found for: \"APTT\"    Assessment/Plan      The patient is a 63-year-old woman who presented in October 2023 with abdominal pain.  She was evaluated at [facility] ED in [place] and the CT scan revealed possible retroperitoneal lymphadenopathy initial biopsy was inconclusive repeat biopsy with flow cytometry revealed CD5 negative, CD10 negative kappa restricted lymphocytes, 4% of events.  Would like to obtain more information and if " necessary consider bone marrow aspiration and biopsy.  The patient is agreeable for follow-up and return in 4 weeks.    The patient had called for follow-up on March 21, 2024 regarding history of abdominal pain and abnormal CT scan with retroperitoneal lymphadenopathy/thickening.  Physical examination was within normal limits.  I have personally discussed with Dr. Caicedo.  Initially a CT-guided biopsy was performed the specimen was inadequate.  Second biopsy flow cytometry was performed but once again it was inconclusive.  I explained to the patient above findings.  Options include    Repeat CT-guided biopsy again.    Do nothing.    Repeat blood work and CT scan of chest abdomen pelvis in 4 months and consider a biopsy if abnormalities are found.  The patient has requested follow-up in 4 months and CT scan and blood work.  The patient was appreciative of the details provided and grateful.  The patient had called for follow-up on July 11, 2024 regarding history of abdominal pain and abnormal CT scans the patient complains of frontal headaches.  Was evaluated by Dr. Hebert and noted to have low iron saturation of 10%.  She was placed on ferrous sulfate 325 mg p.o. daily.  Headaches persist.  Also, patient had retroperitoneal lymphadenopathy and biopsies were attempted twice but inconclusive.  A PET scan was ordered.  Patient anxious to know results of the tests performed.  IgM kappa M protein 2.2 g/dL.  Very suspicious for low-grade lymphoma.  Recall that CT scan of abdomen pelvis had revealed retroperitoneal lymphadenopathy.  CT-guided biopsies attempted twice were inconclusive.  PET scan shows mild FDG avidity in bilateral axilla lymph nodes as well as retroperitoneal lymph nodes but no discrete mass.  If agreeable I have recommended a bone marrow aspiration and biopsy.  The patient had called for follow-up on August 23, 2024 regarding history of abdominal pain and abnormal CT scans the patient complains of frontal  headaches.  Was evaluated by Dr. Hebert and noted to have low iron saturation of 10%.  She was placed on ferrous sulfate 325 mg p.o. daily.  Headaches persist.  Also, patient had retroperitoneal lymphadenopathy and biopsies were attempted twice but inconclusive.  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  I have recommended serum viscosity, ophthalmology evaluation for retinal exam.  The patient is symptomatic and requesting assistance.  Consider single agent Rituxan SQ every 4 weeks after completion of evaluation.  Patient to return in 4 weeks.    Anemia:    Previous hemoglobin 11.1 g/dL recent hemoglobin in June 2024 had improved up to 11.3 g/dL.  Iron saturation decreased at 10%.  The patient was placed on ferrous sulfate 325 mg p.o. daily by .  I had a detailed discussion with the patient and explained to her that her symptoms do not necessarily correlate with her hemoglobin levels or low iron saturation.  However, the patient is convinced that her headache is due to low iron level.  However, I have given her a benefit of doubt and recommended increasing ferrous sulfate 325 mg p.o. half an hour after food daily, slowly increase to 3 times a day reassess in 6 weeks.  I also explained to the patient that it generally takes 4 to 6 weeks for hemoglobin to normalize after initiating iron therapy and 3 months to replenish iron stores.  If the patient does correct with iron replacement therapy it would suggest iron deficiency and would recommend GI evaluation.  The patient is agreeable.  The patient had called for follow-up on August 23, 2024 regarding history of abdominal pain and abnormal CT scans the patient complains of frontal headaches.  Was evaluated by Dr. Hebert and  noted to have low iron saturation of 10%.  She was placed on ferrous sulfate 325 mg p.o. daily.  Headaches persist.  Also, patient had retroperitoneal lymphadenopathy and biopsies were attempted twice but inconclusive.  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  The patient has various symptoms such as abdominal pain, easy fatigability.  Although, she is able to carry on daily activities but feels very fatigued and gives herself a headache.  She has had headaches for many years.  However, the other symptoms I have given her a benefit of doubt and recommended single agent Rituxan given weekly.  Printed material from [facility] provided.  Informed consent obtained.  Patient to initiate cycle 1 Rituxan on September 20, 2024.  Return in 2 weeks.  The patient had come  for follow-up on November 8 , 2024 regarding history of abdominal pain and abnormal CT scans the patient complains of frontal headaches.  Was evaluated by Dr. Hebert and noted to have low iron saturation of 10%.  She was placed on ferrous sulfate 325 mg p.o. daily.  Headaches persist.  Also, patient had retroperitoneal lymphadenopathy and biopsies were attempted twice but inconclusive.  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  The patient claims that she can carry on these work but feels fatigued at the end of the day and gives herself a headache.  In addition the patient also has abdominal pain.  Ophthalmology evaluation at [facility] did not reveal any abnormality related to elevated protein  count/elevated viscosity.  Because of progressive and persistent symptoms the patient was started on weekly Rituxan beginning September 20, 2024.  After 1 treatment the patient claims that she is beginning to feel better.  Previously noted abdominal pain and headaches are less.  M protein down to 1.5 g/dL.  Cycle 4 of weekly Rituxan today.  PET scan on November 1, 2024 after cycle 4 Rituxan revealed Interval decrease in mild hypermetabolic activity of a  para-aortic soft tissue mass with activity at the level of blood  pool, compatible with post treatment changes. Deauville score 2  2. Interval decrease in mild hypermetabolic activity within bilateral  axillary lymph nodes, which are non-specific  3. No new hypermetabolic navdeep or distant metastasis.  4. Hepatomegaly without focal hypermetabolic disease involvement..  Will continue weekly Rituxan x 4.    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  The patient claims that after completion of treatment feeling much better.  Headaches and abdominal discomfort have resolved overall feeling much better.  Hemoglobin has normalized to 12.9 g/dL.  Recommend serum viscosity today.  Consider restaging with CT scan of chest abdomen pelvis and eventually a bone marrow aspiration biopsy to confirm response.    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  The patient claims that after completion of treatment feeling much better.  Headaches and abdominal discomfort have resolved overall feeling much better.  Return in 3 months.    The patient had come for follow-up on May 2, 2025 regarding history of stage IV lymphoplasmacytic lymphoma with M protein, IgM  Kappa, increased viscosity, retroperitoneal lymphadenopathy and headaches.  The patient claims that after completion of treatment feeling much better.  Headaches and abdominal discomfort have resolved overall feeling much better.  M protein 0.6 g/dL, on April 24, 2025 it was 2.4 g/dL on October 14, 2024.      Thank you for allowing me to participate in care of your patient if you have any questions please feel free to call me.  Diagnoses and all orders for this visit:  Retroperitoneal lymphadenopathy  -     Referral to Hematology and Oncology         Jefferson Morales MD

## 2025-07-23 ENCOUNTER — LAB (OUTPATIENT)
Dept: LAB | Facility: CLINIC | Age: 65
End: 2025-07-23
Payer: COMMERCIAL

## 2025-07-23 DIAGNOSIS — C85.80 MARGINAL ZONE B-CELL LYMPHOMA (MULTI): ICD-10-CM

## 2025-07-23 LAB
ALBUMIN SERPL BCP-MCNC: 4.3 G/DL (ref 3.4–5)
ALP SERPL-CCNC: 77 U/L (ref 33–136)
ALT SERPL W P-5'-P-CCNC: 12 U/L (ref 7–45)
ANION GAP SERPL CALC-SCNC: 13 MMOL/L (ref 10–20)
AST SERPL W P-5'-P-CCNC: 16 U/L (ref 9–39)
BASOPHILS # BLD AUTO: 0.02 X10*3/UL (ref 0–0.1)
BASOPHILS NFR BLD AUTO: 0.5 %
BILIRUB SERPL-MCNC: 0.7 MG/DL (ref 0–1.2)
BUN SERPL-MCNC: 15 MG/DL (ref 6–23)
CALCIUM SERPL-MCNC: 9.4 MG/DL (ref 8.6–10.6)
CHLORIDE SERPL-SCNC: 104 MMOL/L (ref 98–107)
CO2 SERPL-SCNC: 27 MMOL/L (ref 21–32)
CREAT SERPL-MCNC: 0.51 MG/DL (ref 0.5–1.05)
EGFRCR SERPLBLD CKD-EPI 2021: >90 ML/MIN/1.73M*2
EOSINOPHIL # BLD AUTO: 0.06 X10*3/UL (ref 0–0.7)
EOSINOPHIL NFR BLD AUTO: 1.4 %
ERYTHROCYTE [DISTWIDTH] IN BLOOD BY AUTOMATED COUNT: 12.9 % (ref 11.5–14.5)
FERRITIN SERPL-MCNC: 53 NG/ML (ref 8–150)
FOLATE SERPL-MCNC: >24 NG/ML
GLUCOSE SERPL-MCNC: 90 MG/DL (ref 74–99)
HAPTOGLOB SERPL NEPH-MCNC: <30 MG/DL (ref 30–200)
HCT VFR BLD AUTO: 40.1 % (ref 36–46)
HGB BLD-MCNC: 13.9 G/DL (ref 12–16)
IMM GRANULOCYTES # BLD AUTO: 0 X10*3/UL (ref 0–0.7)
IMM GRANULOCYTES NFR BLD AUTO: 0 % (ref 0–0.9)
IRON SATN MFR SERPL: 27 % (ref 25–45)
IRON SERPL-MCNC: 96 UG/DL (ref 35–150)
KAPPA LC SERPL-MCNC: 0.93 MG/DL (ref 0.33–1.94)
KAPPA LC/LAMBDA SER: 2.91 {RATIO} (ref 0.26–1.65)
LAMBDA LC SERPL-MCNC: 0.32 MG/DL (ref 0.57–2.63)
LYMPHOCYTES # BLD AUTO: 1.06 X10*3/UL (ref 1.2–4.8)
LYMPHOCYTES NFR BLD AUTO: 24.2 %
MCH RBC QN AUTO: 30 PG (ref 26–34)
MCHC RBC AUTO-ENTMCNC: 34.7 G/DL (ref 32–36)
MCV RBC AUTO: 86 FL (ref 80–100)
MONOCYTES # BLD AUTO: 0.58 X10*3/UL (ref 0.1–1)
MONOCYTES NFR BLD AUTO: 13.2 %
NEUTROPHILS # BLD AUTO: 2.66 X10*3/UL (ref 1.2–7.7)
NEUTROPHILS NFR BLD AUTO: 60.7 %
NRBC BLD-RTO: ABNORMAL /100{WBCS}
PLATELET # BLD AUTO: 287 X10*3/UL (ref 150–450)
POTASSIUM SERPL-SCNC: 4.6 MMOL/L (ref 3.5–5.3)
PROT SERPL-MCNC: 5.9 G/DL (ref 6.4–8.2)
PROT SERPL-MCNC: 5.9 G/DL (ref 6.4–8.2)
RBC # BLD AUTO: 4.64 X10*6/UL (ref 4–5.2)
SODIUM SERPL-SCNC: 139 MMOL/L (ref 136–145)
TIBC SERPL-MCNC: 358 UG/DL (ref 240–445)
UIBC SERPL-MCNC: 262 UG/DL (ref 110–370)
VIT B12 SERPL-MCNC: 810 PG/ML (ref 211–911)
WBC # BLD AUTO: 4.4 X10*3/UL (ref 4.4–11.3)

## 2025-07-23 PROCEDURE — 82746 ASSAY OF FOLIC ACID SERUM: CPT

## 2025-07-23 PROCEDURE — 84165 PROTEIN E-PHORESIS SERUM: CPT | Performed by: STUDENT IN AN ORGANIZED HEALTH CARE EDUCATION/TRAINING PROGRAM

## 2025-07-23 PROCEDURE — 83521 IG LIGHT CHAINS FREE EACH: CPT

## 2025-07-23 PROCEDURE — 84155 ASSAY OF PROTEIN SERUM: CPT

## 2025-07-23 PROCEDURE — 82607 VITAMIN B-12: CPT

## 2025-07-23 PROCEDURE — 86320 SERUM IMMUNOELECTROPHORESIS: CPT | Performed by: STUDENT IN AN ORGANIZED HEALTH CARE EDUCATION/TRAINING PROGRAM

## 2025-07-23 PROCEDURE — 82728 ASSAY OF FERRITIN: CPT

## 2025-07-23 PROCEDURE — 83010 ASSAY OF HAPTOGLOBIN QUANT: CPT

## 2025-07-23 PROCEDURE — 36415 COLL VENOUS BLD VENIPUNCTURE: CPT

## 2025-07-23 PROCEDURE — 80053 COMPREHEN METABOLIC PANEL: CPT

## 2025-07-23 PROCEDURE — 86334 IMMUNOFIX E-PHORESIS SERUM: CPT

## 2025-07-23 PROCEDURE — 85025 COMPLETE CBC W/AUTO DIFF WBC: CPT

## 2025-07-23 PROCEDURE — 83550 IRON BINDING TEST: CPT

## 2025-07-28 LAB
ALBUMIN: 4.1 G/DL (ref 3.4–5)
ALPHA 1 GLOBULIN: 0.2 G/DL (ref 0.2–0.6)
ALPHA 2 GLOBULIN: 0.5 G/DL (ref 0.4–1.1)
BETA GLOBULIN: 0.8 G/DL (ref 0.5–1.2)
GAMMA GLOBULIN: 0.2 G/DL (ref 0.5–1.4)
IMMUNOFIXATION COMMENT: ABNORMAL
M-PROTEIN 1: 0.3 G/DL (ref ?–0)
PATH REVIEW - SERUM IMMUNOFIXATION: ABNORMAL
PATH REVIEW-SERUM PROTEIN ELECTROPHORESIS: ABNORMAL
PROTEIN ELECTROPHORESIS COMMENT: ABNORMAL

## 2025-08-01 ENCOUNTER — OFFICE VISIT (OUTPATIENT)
Dept: HEMATOLOGY/ONCOLOGY | Facility: CLINIC | Age: 65
End: 2025-08-01
Payer: COMMERCIAL

## 2025-08-01 VITALS
SYSTOLIC BLOOD PRESSURE: 118 MMHG | TEMPERATURE: 97.2 F | WEIGHT: 113.87 LBS | HEART RATE: 58 BPM | RESPIRATION RATE: 16 BRPM | OXYGEN SATURATION: 98 % | DIASTOLIC BLOOD PRESSURE: 72 MMHG | BODY MASS INDEX: 20.61 KG/M2

## 2025-08-01 DIAGNOSIS — C85.80 MARGINAL ZONE B-CELL LYMPHOMA (MULTI): ICD-10-CM

## 2025-08-01 PROCEDURE — 99214 OFFICE O/P EST MOD 30 MIN: CPT | Performed by: INTERNAL MEDICINE

## 2025-08-01 ASSESSMENT — PAIN SCALES - GENERAL: PAINLEVEL_OUTOF10: 0-NO PAIN

## 2025-08-01 NOTE — PROGRESS NOTES
Patient ID: Cecelia Birmingham is a 65 y.o. female.  Referring Physician: Jefferson Morales MD  [address] [facility], [place] [id number]  Primary Care Provider: Heriberto Hebert MD  Visit Type: Initial Visit  Bone marrow aspiration and biopsy on August 2, 2024 revealed hypercellular bone marrow with involvement by CD5, CD10 negative lymphoproliferative disorder.    Plasma cell population with cytoplasmic kappa excess 5% present.    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    IgM kappa 2.2 g/dL, beta-2 microglobulin 3.7 mcg/dL.  Chromosomal analysis revealed 46XX, +4, +6, add(6)(q12)x2,-8[2]/46XX del(6)(q12q25)[2]/46XX[17]  Retroperitoneal lymphadenopathy.  Started single agent weekly Rituxan on 9/20/24 received total of 8 cycles.  Subjective    HPI  The patient was referred to me for further evaluation and management of retroperitoneal lymphadenopathy, evaluated on March 14, 2024.  The patient was doing well till October 25, 2023 when she noticed acute onset of sharp mid abdominal pain shortly after awakening from sleep.  It persisted through the day with associated bloating.  The patient worked the whole day but eventually presented to ED in [facility].  Did not have any fevers chills sweats nausea vomiting diarrhea constipation hematemesis melena hematochezia materia.  The patient was discharged on antibiotics after CT scans.  The patient claims that she is felt better bloating is resolved she does have minimal residual abdominal discomfort.  CT scan revealed aortocaval and left periaortic ill-defined soft tissue extending below the aortic bifurcation.  Differential diagnosis thought to be retroperitoneal fibrosis, idiopathic, not aneurysmal form of colonic aortitis or lymphoma.  There was no associated hydronephrosis.  A 2.8 cm mixed hyperattenuating and hypoattenuating mass was noted in the left lobe of the liver.  A tiny 7 mm lesion was  noted in the lower pole of the right kidney.  A follow-up MRI at  revealed simple cyst in the right kidney 5 mm at the lower pole.  Hemangioma noted in the liver corresponding to the CT finding.  The retroperitoneal soft tissue fullness is confirmed and not significantly different than the CT imaging.  The patient has never had abdominal operation.CT-guided biopsy on January 12, 2024 revealed limited tissue with an atypical lymphoid infiltrate.  Flow cytometry on February 6, 2024 revealed CD5 negative, CD10 negative, kappa restricted B-cell population 4% of total events no abnormal T-cell population identified.    At interview on March 14, 2024 the patient narrated entire history.  Denied history of weight loss, fevers, night sweats, chest pain, shortness of breath, nausea, vomiting, hematemesis, melena, hematochezia and hematuria.    The patient had called for follow-up on July 11, 2024 regarding history of abdominal pain and abnormal CT scans the patient complains of frontal headaches.  Was evaluated by Dr. Hebert and noted to have low iron saturation of 10%.  She was placed on ferrous sulfate 325 mg p.o. daily.  Headaches persist.  Also, patient had retroperitoneal lymphadenopathy and biopsies were attempted twice but inconclusive.  A PET scan was ordered.  Patient anxious to know results of the tests performed.    The patient had called for follow-up on August 23, 2024 regarding history of abdominal pain and abnormal CT scans the patient complains of frontal headaches.  Was evaluated by Dr. Hebert and noted to have low iron saturation of 10%.  She was placed on ferrous sulfate 325 mg p.o. daily.  Headaches persist.  Also, patient had retroperitoneal lymphadenopathy and biopsies were attempted twice but inconclusive.  A bone marrow aspiration and biopsy on August 2, 2024 revealed findings consistent with 5% plasma cells kappa restriction, suggestive of MGUS, hypercellular bone marrow with involvement of CD5, CD10  negative lymphoplasmacytic lymphoma with MYD 88 mutation and IgH mutation as well.  The patient had called for follow-up on September 13 2024 regarding history of abdominal pain and abnormal CT scans the patient complains of frontal headaches.  Was evaluated by Dr. Hebert and noted to have low iron saturation of 10%.  She was placed on ferrous sulfate 325 mg p.o. daily.  Headaches persist.  Also, patient had retroperitoneal lymphadenopathy and biopsies were attempted twice but inconclusive.  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  The patient claims that she can carry on these work but feels fatigued at the end of the day and gives herself a headache.  In addition the patient also has abdominal pain.  Ophthalmology evaluation at [facility] did not reveal any abnormality related to elevated protein count/elevated viscosity.    The patient had come  for follow-up on November 8, 2024 regarding history of abdominal pain and abnormal CT scans the patient complains of frontal headaches.  Was evaluated by Dr. Hebert and noted to have low iron saturation of 10%.  She was placed on ferrous sulfate 325 mg p.o. daily.  Headaches persist.  Also, patient had retroperitoneal lymphadenopathy and biopsies were attempted twice but inconclusive.  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  The patient claims that she can carry on  work but feels fatigued at the end of the day and gives herself a headache.  In addition the patient also has abdominal pain.  Ophthalmology  evaluation at [facility] did not reveal any abnormality related to elevated protein count/elevated viscosity.  Because of progressive and persistent symptoms the patient was started on weekly Rituxan beginning September 20, 2024.  After 1 treatment the patient claims that she is beginning to feel better Previously noted abdominal pain and headaches are less.  Cycle 4 today M protein down to 1.5 g/dL.    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  The patient claims that after completion of treatment feeling much better.  Headaches and abdominal discomfort have resolved overall feeling much better.    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  The patient claims that after completion of treatment feeling much better.  Headaches and abdominal discomfort have resolved overall feeling much better.    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  The patient claims that after completion of treatment feeling much better.  Headaches and abdominal discomfort have resolved overall feeling much better.    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  Received weekly Rituxan x 8.  The patient claims that after completion of treatment feeling much better.  Headaches and abdominal discomfort have resolved overall feeling much better.    Past medical history:    As above.    Past surgical  history:  History of uterine ablation on March 2, 2020    Mammogram:    2 years ago.    Colonoscopy:    4 years ago.        Review of Systems   All other systems reviewed and are negative.       Objective   BSA: 1.51 meters squared  /72   Pulse 58   Temp 36.2 °C (97.2 °F)   Resp 16   Wt 51.6 kg (113 lb 13.9 oz)   SpO2 98%   BMI 20.61 kg/m²      has no past medical history on file.   has a past surgical history that includes Other surgical history (03/02/2020).  Family History   Problem Relation Name Age of Onset    Breast cancer Other Grandparent      Oncology History   Marginal zone B-cell lymphoma (Multi)   9/5/2024 Initial Diagnosis    Marginal zone B-cell lymphoma (Multi)     9/20/2024 - 10/11/2024 Chemotherapy    RiTUXimab (Weekly), 28 Day Cycle      11/8/2024 -  Chemotherapy    RiTUXimab (Weekly), 28 Day Cycle          Cecelia Birmingham  reports that she has never smoked. She has never been exposed to tobacco smoke. She has never used smokeless tobacco.  She  reports current alcohol use of about 2.0 standard drinks of alcohol per week.  She  reports no history of drug use.    Physical Exam  Constitutional:       Appearance: Normal appearance.   HENT:      Head: Normocephalic and atraumatic.      Nose: Nose normal.      Mouth/Throat:      Mouth: Mucous membranes are moist.      Pharynx: Oropharynx is clear.     Eyes:      Extraocular Movements: Extraocular movements intact.      Conjunctiva/sclera: Conjunctivae normal.      Pupils: Pupils are equal, round, and reactive to light.       Cardiovascular:      Rate and Rhythm: Normal rate and regular rhythm.   Pulmonary:      Effort: Pulmonary effort is normal.      Breath sounds: Normal breath sounds.   Abdominal:      General: Abdomen is flat. Bowel sounds are normal.      Palpations: Abdomen is soft.     Musculoskeletal:         General: Normal range of motion.      Cervical back: Normal range of motion and neck supple.     Neurological:      General:  No focal deficit present.      Mental Status: She is alert and oriented to person, place, and time. Mental status is at baseline.     Psychiatric:         Mood and Affect: Mood normal.         Behavior: Behavior normal.         Thought Content: Thought content normal.         Judgment: Judgment normal.         WBC   Date/Time Value Ref Range Status   07/23/2025 08:08 AM 4.4 4.4 - 11.3 x10*3/uL Final   04/24/2025 09:55 AM 4.0 (L) 4.4 - 11.3 x10*3/uL Final   12/20/2024 12:05 PM 5.8 4.4 - 11.3 x10*3/uL Final     nRBC   Date Value Ref Range Status   07/23/2025   Final     Comment:     Not Measured   04/24/2025   Final     Comment:     Not Measured   12/20/2024   Final     Comment:     Not Measured     RBC   Date Value Ref Range Status   07/23/2025 4.64 4.00 - 5.20 x10*6/uL Final   04/24/2025 4.84 4.00 - 5.20 x10*6/uL Final   12/20/2024 4.66 4.00 - 5.20 x10*6/uL Final     Hemoglobin   Date Value Ref Range Status   07/23/2025 13.9 12.0 - 16.0 g/dL Final   04/24/2025 14.5 12.0 - 16.0 g/dL Final   12/20/2024 13.5 12.0 - 16.0 g/dL Final     Hematocrit   Date Value Ref Range Status   07/23/2025 40.1 36.0 - 46.0 % Final   04/24/2025 42.5 36.0 - 46.0 % Final   12/20/2024 40.4 36.0 - 46.0 % Final     MCV   Date/Time Value Ref Range Status   07/23/2025 08:08 AM 86 80 - 100 fL Final   04/24/2025 09:55 AM 88 80 - 100 fL Final   12/20/2024 12:05 PM 87 80 - 100 fL Final     MCH   Date/Time Value Ref Range Status   07/23/2025 08:08 AM 30.0 26.0 - 34.0 pg Final   04/24/2025 09:55 AM 30.0 26.0 - 34.0 pg Final   12/20/2024 12:05 PM 29.0 26.0 - 34.0 pg Final     MCHC   Date/Time Value Ref Range Status   07/23/2025 08:08 AM 34.7 32.0 - 36.0 g/dL Final   04/24/2025 09:55 AM 34.1 32.0 - 36.0 g/dL Final   12/20/2024 12:05 PM 33.4 32.0 - 36.0 g/dL Final     RDW   Date/Time Value Ref Range Status   07/23/2025 08:08 AM 12.9 11.5 - 14.5 % Final   04/24/2025 09:55 AM 12.7 11.5 - 14.5 % Final   12/20/2024 12:05 PM 13.6 11.5 - 14.5 % Final  "    Platelets   Date/Time Value Ref Range Status   07/23/2025 08:08  150 - 450 x10*3/uL Final   04/24/2025 09:55  150 - 450 x10*3/uL Final   12/20/2024 12:05  150 - 450 x10*3/uL Final     No results found for: \"MPV\"  Neutrophils %   Date/Time Value Ref Range Status   07/23/2025 08:08 AM 60.7 40.0 - 80.0 % Final   04/24/2025 09:55 AM 55.2 40.0 - 80.0 % Final   12/20/2024 12:05 PM 62.6 40.0 - 80.0 % Final     Immature Granulocytes %, Automated   Date/Time Value Ref Range Status   07/23/2025 08:08 AM 0.0 0.0 - 0.9 % Final     Comment:     Immature Granulocyte Count (IG) includes promyelocytes, myelocytes and metamyelocytes but does not include bands. Percent differential counts (%) should be interpreted in the context of the absolute cell counts (cells/UL).   04/24/2025 09:55 AM 0.0 0.0 - 0.9 % Final     Comment:     Immature Granulocyte Count (IG) includes promyelocytes, myelocytes and metamyelocytes but does not include bands. Percent differential counts (%) should be interpreted in the context of the absolute cell counts (cells/UL).   12/20/2024 12:05 PM 0.0 0.0 - 0.9 % Final     Comment:     Immature Granulocyte Count (IG) includes promyelocytes, myelocytes and metamyelocytes but does not include bands. Percent differential counts (%) should be interpreted in the context of the absolute cell counts (cells/UL).     Lymphocytes %   Date/Time Value Ref Range Status   07/23/2025 08:08 AM 24.2 13.0 - 44.0 % Final   04/24/2025 09:55 AM 26.9 13.0 - 44.0 % Final   12/20/2024 12:05 PM 22.2 13.0 - 44.0 % Final     Monocytes %   Date/Time Value Ref Range Status   07/23/2025 08:08 AM 13.2 2.0 - 10.0 % Final   04/24/2025 09:55 AM 15.7 2.0 - 10.0 % Final   12/20/2024 12:05 PM 13.2 2.0 - 10.0 % Final     Eosinophils %   Date/Time Value Ref Range Status   07/23/2025 08:08 AM 1.4 0.0 - 6.0 % Final   04/24/2025 09:55 AM 1.7 0.0 - 6.0 % Final   12/20/2024 12:05 PM 1.5 0.0 - 6.0 % Final     Basophils %   Date/Time " "Value Ref Range Status   07/23/2025 08:08 AM 0.5 0.0 - 2.0 % Final   04/24/2025 09:55 AM 0.5 0.0 - 2.0 % Final   12/20/2024 12:05 PM 0.5 0.0 - 2.0 % Final     Neutrophils Absolute   Date/Time Value Ref Range Status   07/23/2025 08:08 AM 2.66 1.20 - 7.70 x10*3/uL Final     Comment:     Percent differential counts (%) should be interpreted in the context of the absolute cell counts (cells/uL).   04/24/2025 09:55 AM 2.21 1.20 - 7.70 x10*3/uL Final     Comment:     Percent differential counts (%) should be interpreted in the context of the absolute cell counts (cells/uL).   12/20/2024 12:05 PM 3.64 1.20 - 7.70 x10*3/uL Final     Comment:     Percent differential counts (%) should be interpreted in the context of the absolute cell counts (cells/uL).     Immature Granulocytes Absolute, Automated   Date/Time Value Ref Range Status   07/23/2025 08:08 AM 0.00 0.00 - 0.70 x10*3/uL Final   04/24/2025 09:55 AM 0.00 0.00 - 0.70 x10*3/uL Final   12/20/2024 12:05 PM 0.00 0.00 - 0.70 x10*3/uL Final     Lymphocytes Absolute   Date/Time Value Ref Range Status   07/23/2025 08:08 AM 1.06 (L) 1.20 - 4.80 x10*3/uL Final   04/24/2025 09:55 AM 1.08 (L) 1.20 - 4.80 x10*3/uL Final   12/20/2024 12:05 PM 1.29 1.20 - 4.80 x10*3/uL Final     Monocytes Absolute   Date/Time Value Ref Range Status   07/23/2025 08:08 AM 0.58 0.10 - 1.00 x10*3/uL Final   04/24/2025 09:55 AM 0.63 0.10 - 1.00 x10*3/uL Final   12/20/2024 12:05 PM 0.77 0.10 - 1.00 x10*3/uL Final     Eosinophils Absolute   Date/Time Value Ref Range Status   07/23/2025 08:08 AM 0.06 0.00 - 0.70 x10*3/uL Final   04/24/2025 09:55 AM 0.07 0.00 - 0.70 x10*3/uL Final   12/20/2024 12:05 PM 0.09 0.00 - 0.70 x10*3/uL Final     Basophils Absolute   Date/Time Value Ref Range Status   07/23/2025 08:08 AM 0.02 0.00 - 0.10 x10*3/uL Final   04/24/2025 09:55 AM 0.02 0.00 - 0.10 x10*3/uL Final   12/20/2024 12:05 PM 0.03 0.00 - 0.10 x10*3/uL Final       No components found for: \"PT\"  No results found for: " "\"APTT\"    Assessment/Plan      The patient is a 63-year-old woman who presented in October 2023 with abdominal pain.  She was evaluated at [facility] ED in [place] and the CT scan revealed possible retroperitoneal lymphadenopathy initial biopsy was inconclusive repeat biopsy with flow cytometry revealed CD5 negative, CD10 negative kappa restricted lymphocytes, 4% of events.  Would like to obtain more information and if necessary consider bone marrow aspiration and biopsy.  The patient is agreeable for follow-up and return in 4 weeks.    The patient had called for follow-up on March 21, 2024 regarding history of abdominal pain and abnormal CT scan with retroperitoneal lymphadenopathy/thickening.  Physical examination was within normal limits.  I have personally discussed with Dr. Caicedo.  Initially a CT-guided biopsy was performed the specimen was inadequate.  Second biopsy flow cytometry was performed but once again it was inconclusive.  I explained to the patient above findings.  Options include    Repeat CT-guided biopsy again.    Do nothing.    Repeat blood work and CT scan of chest abdomen pelvis in 4 months and consider a biopsy if abnormalities are found.  The patient has requested follow-up in 4 months and CT scan and blood work.  The patient was appreciative of the details provided and grateful.  The patient had called for follow-up on July 11, 2024 regarding history of abdominal pain and abnormal CT scans the patient complains of frontal headaches.  Was evaluated by Dr. Hebert and noted to have low iron saturation of 10%.  She was placed on ferrous sulfate 325 mg p.o. daily.  Headaches persist.  Also, patient had retroperitoneal lymphadenopathy and biopsies were attempted twice but inconclusive.  A PET scan was ordered.  Patient anxious to know results of the tests performed.  IgM kappa M protein 2.2 g/dL.  Very suspicious for low-grade lymphoma.  Recall that CT scan of abdomen pelvis had revealed retroperitoneal " lymphadenopathy.  CT-guided biopsies attempted twice were inconclusive.  PET scan shows mild FDG avidity in bilateral axilla lymph nodes as well as retroperitoneal lymph nodes but no discrete mass.  If agreeable I have recommended a bone marrow aspiration and biopsy.  The patient had called for follow-up on August 23, 2024 regarding history of abdominal pain and abnormal CT scans the patient complains of frontal headaches.  Was evaluated by Dr. Hebert and noted to have low iron saturation of 10%.  She was placed on ferrous sulfate 325 mg p.o. daily.  Headaches persist.  Also, patient had retroperitoneal lymphadenopathy and biopsies were attempted twice but inconclusive.  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  I have recommended serum viscosity, ophthalmology evaluation for retinal exam.  The patient is symptomatic and requesting assistance.  Consider single agent Rituxan SQ every 4 weeks after completion of evaluation.  Patient to return in 4 weeks.    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  Received weekly Rituxan x 8.  The patient claims that after completion of treatment feeling much better.  Headaches and abdominal discomfort have resolved overall feeling much better.  Return in 3 months.    Anemia:    Previous hemoglobin 11.1 g/dL recent hemoglobin in June 2024 had improved up to 11.3 g/dL.  Iron saturation decreased at 10%.  The patient was placed on ferrous sulfate 325 mg p.o. daily by .  I had a detailed discussion with the patient and explained to her that her symptoms do not necessarily correlate  with her hemoglobin levels or low iron saturation.  However, the patient is convinced that her headache is due to low iron level.  However, I have given her a benefit of doubt and recommended increasing ferrous sulfate 325 mg p.o. half an hour after food daily, slowly increase to 3 times a day reassess in 6 weeks.  I also explained to the patient that it generally takes 4 to 6 weeks for hemoglobin to normalize after initiating iron therapy and 3 months to replenish iron stores.  If the patient does correct with iron replacement therapy it would suggest iron deficiency and would recommend GI evaluation.  The patient is agreeable.  The patient had called for follow-up on August 23, 2024 regarding history of abdominal pain and abnormal CT scans the patient complains of frontal headaches.  Was evaluated by Dr. Hebert and noted to have low iron saturation of 10%.  She was placed on ferrous sulfate 325 mg p.o. daily.  Headaches persist.  Also, patient had retroperitoneal lymphadenopathy and biopsies were attempted twice but inconclusive.  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  The patient has various symptoms such as abdominal pain, easy fatigability.  Although, she is able to carry on daily activities but feels very fatigued and gives herself a headache.  She has had headaches for many years.  However, the other symptoms I have given her a benefit of doubt and recommended single agent Rituxan given weekly.  Printed material from [employer] provided.  Informed consent obtained.  Patient to initiate cycle 1 Rituxan on September 20, 2024.  Return in 2 weeks.  The patient had come  for follow-up on November 8 , 2024 regarding history of abdominal pain and abnormal CT scans the patient  complains of frontal headaches.  Was evaluated by Dr. Hebert and noted to have low iron saturation of 10%.  She was placed on ferrous sulfate 325 mg p.o. daily.  Headaches persist.  Also, patient had retroperitoneal lymphadenopathy and biopsies were attempted twice but inconclusive.  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  The patient claims that she can carry on these work but feels fatigued at the end of the day and gives herself a headache.  In addition the patient also has abdominal pain.  Ophthalmology evaluation at [facility] did not reveal any abnormality related to elevated protein count/elevated viscosity.  Because of progressive and persistent symptoms the patient was started on weekly Rituxan beginning September 20, 2024.  After 1 treatment the patient claims that she is beginning to feel better.  Previously noted abdominal pain and headaches are less.  M protein down to 1.5 g/dL.  Cycle 4 of weekly Rituxan today.  PET scan on November 1, 2024 after cycle 4 Rituxan revealed Interval decrease in mild hypermetabolic activity of a  para-aortic soft tissue mass with activity at the level of blood  pool, compatible with post treatment changes. Deauville score 2  2. Interval decrease in mild hypermetabolic activity within bilateral  axillary lymph nodes, which are non-specific  3. No new hypermetabolic navdeep or distant metastasis.  4. Hepatomegaly without focal hypermetabolic disease involvement..  Will continue weekly Rituxan x 4.    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  The patient claims that after completion  of treatment feeling much better.  Headaches and abdominal discomfort have resolved overall feeling much better.  Hemoglobin has normalized to 12.9 g/dL.  Recommend serum viscosity today.  Consider restaging with CT scan of chest abdomen pelvis and eventually a bone marrow aspiration biopsy to confirm response.    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  The patient claims that after completion of treatment feeling much better.  Headaches and abdominal discomfort have resolved overall feeling much better.  Return in 3 months.    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  The patient claims that after completion of treatment feeling much better.  Headaches and abdominal discomfort have resolved overall feeling much better.  M protein 0.6 g/dL, on April 24, 2025 it was 2.4 g/dL on October 14, 2024.      Thank you for allowing me to participate in care of your patient if you have any questions please feel free to call me.  Diagnoses and all orders for this visit:  Retroperitoneal lymphadenopathy  -     Referral to Hematology and Oncology         Jefferson Morales MD

## 2025-08-01 NOTE — PROGRESS NOTES
Patient here for FUV.  Labs drawn prior to today and reviewed with patient per provider.  Patient without any questions or concerns.  FUV in 3 months with labs prior at [facility] lab per patient.  Patient independently ambulatory off unit in NAD and without complaints.  Gait steady.  Call back instructions reviewed.  Patient verbalized understanding.